# Patient Record
Sex: MALE | Race: WHITE | NOT HISPANIC OR LATINO | Employment: OTHER | URBAN - METROPOLITAN AREA
[De-identification: names, ages, dates, MRNs, and addresses within clinical notes are randomized per-mention and may not be internally consistent; named-entity substitution may affect disease eponyms.]

---

## 2018-11-13 ENCOUNTER — NURSE TRIAGE (OUTPATIENT)
Dept: PHYSICAL THERAPY | Facility: OTHER | Age: 68
End: 2018-11-13

## 2018-11-13 DIAGNOSIS — M54.50 CHRONIC MIDLINE LOW BACK PAIN WITHOUT SCIATICA: ICD-10-CM

## 2018-11-13 DIAGNOSIS — G89.29 CHRONIC MIDLINE LOW BACK PAIN WITHOUT SCIATICA: ICD-10-CM

## 2018-11-13 DIAGNOSIS — G89.29 CHRONIC RIGHT SHOULDER PAIN: ICD-10-CM

## 2018-11-13 DIAGNOSIS — M54.2 NECK PAIN: Primary | ICD-10-CM

## 2018-11-13 DIAGNOSIS — M25.511 CHRONIC RIGHT SHOULDER PAIN: ICD-10-CM

## 2018-11-13 NOTE — TELEPHONE ENCOUNTER
Pt is with wife at rehab center now and read brochure and decided to call  Neck hurts all the time  Right shoulder hurts and then my low back  Did physical therapy about 15 yrs ago  Went to pain management specialist about 3 yrs ago  Did MRI and obtained shots  Has pain when bending over to  items  Reports it's hard to sleep  Reason for Disposition   Is this a chronic condition? Background - Initial Assessment  Clinical complaint: Neck hurts all the time  Right shoulder hurts and then my low back  Date of onset: 15 yrs ago  Mechanism of injury: unk    Previous Treatment - Previous Treatment  Previous evaluation: pain management (3 yrs ago), chiropractor  Current provider: PCP  Diagnostics: MRI (3 yrs ago)  Previous treatment: physical therapy (15 yrs ago)    Protocols used:  AMB COMPREHENSIVE SPINE PROGRAM PROTOCOL    Pt reports he has a rash currently and is seeing a dermatologist    Started on his neck, spread across his chest, and is now on right arm, right leg and right ankle  Pt states that when he lays in bed flat and legs are straight, his lower back and legs hurt and to alleviate the  Pain he flexes his knees  States he is interested in physical therapy  RN also suggested [facility]) referral and pt agreed  Pt was currently at the [facility] with his wife and stated now that the referral is in, he will schedule right now with the

## 2018-11-19 ENCOUNTER — EVALUATION (OUTPATIENT)
Dept: PHYSICAL THERAPY | Facility: CLINIC | Age: 68
End: 2018-11-19
Payer: MEDICARE

## 2018-11-19 DIAGNOSIS — M25.511 CHRONIC RIGHT SHOULDER PAIN: ICD-10-CM

## 2018-11-19 DIAGNOSIS — M54.2 NECK PAIN: Primary | ICD-10-CM

## 2018-11-19 DIAGNOSIS — G89.29 CHRONIC MIDLINE LOW BACK PAIN WITHOUT SCIATICA: ICD-10-CM

## 2018-11-19 DIAGNOSIS — G89.29 CHRONIC RIGHT SHOULDER PAIN: ICD-10-CM

## 2018-11-19 DIAGNOSIS — M54.50 CHRONIC MIDLINE LOW BACK PAIN WITHOUT SCIATICA: ICD-10-CM

## 2018-11-19 PROCEDURE — 97112 NEUROMUSCULAR REEDUCATION: CPT | Performed by: PHYSICAL THERAPIST

## 2018-11-19 PROCEDURE — 97163 PT EVAL HIGH COMPLEX 45 MIN: CPT | Performed by: PHYSICAL THERAPIST

## 2018-11-19 PROCEDURE — G8991 OTHER PT/OT GOAL STATUS: HCPCS | Performed by: PHYSICAL THERAPIST

## 2018-11-19 PROCEDURE — G8990 OTHER PT/OT CURRENT STATUS: HCPCS | Performed by: PHYSICAL THERAPIST

## 2018-11-19 PROCEDURE — 97110 THERAPEUTIC EXERCISES: CPT | Performed by: PHYSICAL THERAPIST

## 2018-11-19 NOTE — PROGRESS NOTES
PT Evaluation     Today's date: 2018  Patient name: Chris Black  : 1950  MRN: [id number]  Referring provider: Kody Aguilar MD  Dx:   Encounter Diagnosis     ICD-10-CM    1  Neck pain M54 2 Ambulatory referral to PT spine   2  Chronic right shoulder pain M25 511 Ambulatory referral to PT spine    G89 29    3  Chronic midline low back pain without sciatica M54 5 Ambulatory referral to PT spine    G89 29                   Assessment  Functional limitations: Pain bending over, sitting longer than 25 minutes, carrying heavy objects, and looking over shoulder while driving  Assessment details: Abundio montoya 79 y o  male referred with primary diagnosis of Neck pain  (primary encounter diagnosis)  Chronic right shoulder pain  Chronic midline low back pain without sciatica   Patient presents with the following functional limitations: Pain bending over, sitting longer than 25 minutes, carrying heavy objects, and looking over shoulder while driving, as well as, decreased ROM and decreased core strength  Treatment to include: Manual therapy techniques, extremity/core strengthening, neuromuscular control exercises, balance/proprioception training as appropriate, instruction in a comprehensive HEP, and modalities as needed  They will benefit from skilled PT services to address the above functional deficits and to decrease pain to promote a return to their premorbid level of function  Understanding of Dx/Px/POC: good   Prognosis: fair    Goals  STG (4 weeks)  1  Patient will report pain as a 5-6/10 at worst with activity  2  Patient will demonstrate cervical and lumbar ROM to [facility] in order to improve ability to look over shoulder when driving  LTG (8 weeks)  1  Patient will report pain as a 4-5/10 at worst with everyday activity  2  Patient will demonstrate good body mechanics with lifting  3   Patient will be independent and compliant with a HEP in order to maintain gains made with skilled PT services  Plan  Patient would benefit from: skilled physical therapy  Planned therapy interventions: abdominal trunk stabilization, home exercise program, therapeutic exercise, therapeutic activities, stretching, strengthening, postural training, patient education, neuromuscular re-education and manual therapy  Frequency: 2x week  Duration in weeks: 8  Plan of Care beginning date: 2018  Plan of Care expiration date: 2019  Treatment plan discussed with: patient        Subjective Evaluation    History of Present Illness  Date of onset: 1998  Mechanism of injury: Patient states he hurt his lower back about 25 years ago  He tried physical therapy about 20 years ago  He went to pain management and had injections about 3 years ago  He had an MRI, but is unsure of results  He thinks he has bulging discs in both his neck and back  Pain has been constant for the past 20 years  Called [facility] and is referred now to outpatient PT services  Recurrent probem    Quality of life: fair    Pain  Current pain ratin  At best pain ratin  At worst pain ratin  Location: Right cervical paraspinals and UT  Lower thoracolumbar spine and right paraspinals  Quality: pressure  Progression: no change    Social Support    Employment status: working (Works 3-4x/wk for a few hours   at [employer]")  Hand dominance: right  Exercise history: Sedentary      Diagnostic Tests    FCE comments: Intermittent "numbness" into left medial thigh and calf  Denies weakness in bilateral UE/LE  Treatments  Previous treatment: chiropractic  Patient Goals  Patient goal: To relieve his pain  Objective     Special Questions  Positive for disturbed sleep   Negative for night pain, dizziness, faints, headaches, tinnitus, shortness of breath, bladder dysfunction, bowel dysfunction and saddle (S4) numbness    Postural Observations  Seated posture: poor  Standing posture: poor  Correction of posture: makes symptoms better        Palpation     Right   Hypertonic in the lumbar paraspinals  Tenderness of the lumbar paraspinals  Tenderness     Lumbar Spine  Tenderness in the spinous process (T11-L3)  Neurological Testing     Sensation   Cervical/Thoracic   Left   Intact: light touch    Right   Intact: light touch    Lumbar   Left   Intact: light touch    Right   Intact: light touch    Reflexes   Left   Biceps (C5/C6): normal (2+)  Brachioradialis (C6): normal (2+)  Triceps (C7): normal (2+)  Patellar (L4): normal (2+)  Achilles (S1): normal (2+)    Right   Biceps (C5/C6): normal (2+)  Brachioradialis (C6): normal (2+)  Triceps (C7): normal (2+)  Patellar (L4): normal (2+)  Achilles (S1): normal (2+)    Active Range of Motion   Cervical/Thoracic Spine   Cervical    Flexion: 35 degrees with pain  Extension: 45 degrees   Left lateral flexion: 35 degrees with pain  Right lateral flexion: 40 degrees   Left rotation: 65 degrees   Right rotation: 70 degrees     Additional Active Range of Motion Details  Lumbar ROM limited 50% all directions  Painful with flexion  Pain relieved with extension    Joint Play Hypomobile: C2, C3, C4, C5, C6 and C7     Strength/Myotome Testing   Cervical Spine     Left   Normal strength    Right etr  Normal strength    Left Hip   Planes of Motion   Flexion: 4+  Extension: 4+  Abduction: 4+    Right Hip   Planes of Motion   Flexion: 4+  Extension: 4+  Abduction: 4+    Left Knee   Flexion: 4+  Extension: 4+    Right Knee   Flexion: 4+  Extension: 4+    Left Ankle/Foot   Dorsiflexion: 4+    Right Ankle/Foot   Dorsiflexion: 4+    Tests   Cervical     Left   Positive cervical distraction and Spurling's sign  Lumbar   Positive repeated extension  Left   Positive passive SLR  Negative crossed SLR  Right   Positive passive SLR       Additional Tests Details  Right UT pain relieved with Loaded moderate retractions 3x10  Repeated flexion in standing decreased LBP  Repeated press-ups prone increased pain          Precautions None    Specialty Daily Treatment Diary     Manual  11/19       Loaded moderate cervical retractions 3x10       cervial traction                                    Exercise Diary  11/19       Postural education JF       Nustep with JAMIE Rice rows H,M,L BTB x20 ea       HS stretch kat  3x30"       Standing lumbar ext  2x10       Bkwd shoulder rolls        Scapular retractions        Bilateral UT stretch        Cervical retractions with overpressure Instructed       Supine PPT        [facility]        [facility]                                                                      Modalities

## 2018-11-27 ENCOUNTER — OFFICE VISIT (OUTPATIENT)
Dept: PHYSICAL THERAPY | Facility: CLINIC | Age: 68
End: 2018-11-27
Payer: MEDICARE

## 2018-11-27 DIAGNOSIS — M25.511 CHRONIC RIGHT SHOULDER PAIN: ICD-10-CM

## 2018-11-27 DIAGNOSIS — M54.2 NECK PAIN: Primary | ICD-10-CM

## 2018-11-27 DIAGNOSIS — G89.29 CHRONIC MIDLINE LOW BACK PAIN WITHOUT SCIATICA: ICD-10-CM

## 2018-11-27 DIAGNOSIS — G89.29 CHRONIC RIGHT SHOULDER PAIN: ICD-10-CM

## 2018-11-27 DIAGNOSIS — M54.50 CHRONIC MIDLINE LOW BACK PAIN WITHOUT SCIATICA: ICD-10-CM

## 2018-11-27 PROCEDURE — 97110 THERAPEUTIC EXERCISES: CPT | Performed by: PHYSICAL THERAPIST

## 2018-11-27 PROCEDURE — 97140 MANUAL THERAPY 1/> REGIONS: CPT | Performed by: PHYSICAL THERAPIST

## 2018-11-27 NOTE — PROGRESS NOTES
Daily Note     Today's date: 2018  Patient name: Ryne Gaines  : 1950  MRN: [id number]  Referring provider: Gladys Scott MD  Dx:   Encounter Diagnosis     ICD-10-CM    1  Neck pain M54 2    2  Chronic right shoulder pain M25 511     G89 29    3  Chronic midline low back pain without sciatica M54 5     G89 29                   Subjective: Patient reports resting pain today is a 7/10 in his cervical and lumbar spine  Objective: See treatment diary below      Assessment: Initiated exercises this session to address impairments noted during initial evaluation  Pt required minimal verbal and tactile cues to avoid shrugging shoulders during TB strengthening exercises  Pt reported significant reduction in cervical discomfort following manual cervical traction  Consider using mechanical traction in future visits  Pain decreased from 7/10- 4-5/10 in cervical spine  Plan: Progress treatment as tolerated  Precautions None     Specialty Daily Treatment Diary      Manual           Loaded moderate cervical retractions 3x10           cervical traction    JG                                                         Exercise Diary           Postural education JF           Nustep with UE    L5, 6 mins         Webslide rows H,M,L BTB x20 ea  BTB x20 ea         HS stretch kat  3x30"  3x30"         Standing lumbar ext    2x10  2 x 10         Bkwd shoulder rolls    20 x         Scapular retractions             Bilateral UT stretch    3 x 30" ea         Cervical retractions with overpressure Instructed           Supine PPT   10 x 10"         PPT with march    20 x 2" hold ea         Bridges             sidelying clamshells kat                                                                                                                      Modalities

## 2018-11-29 ENCOUNTER — OFFICE VISIT (OUTPATIENT)
Dept: PHYSICAL THERAPY | Facility: CLINIC | Age: 68
End: 2018-11-29
Payer: MEDICARE

## 2018-11-29 DIAGNOSIS — M25.511 CHRONIC RIGHT SHOULDER PAIN: ICD-10-CM

## 2018-11-29 DIAGNOSIS — G89.29 CHRONIC RIGHT SHOULDER PAIN: ICD-10-CM

## 2018-11-29 DIAGNOSIS — M54.50 CHRONIC MIDLINE LOW BACK PAIN WITHOUT SCIATICA: ICD-10-CM

## 2018-11-29 DIAGNOSIS — G89.29 CHRONIC MIDLINE LOW BACK PAIN WITHOUT SCIATICA: ICD-10-CM

## 2018-11-29 DIAGNOSIS — M54.2 NECK PAIN: Primary | ICD-10-CM

## 2018-11-29 PROCEDURE — 97140 MANUAL THERAPY 1/> REGIONS: CPT | Performed by: PHYSICAL THERAPIST

## 2018-11-29 PROCEDURE — 97110 THERAPEUTIC EXERCISES: CPT | Performed by: PHYSICAL THERAPIST

## 2018-11-29 NOTE — PROGRESS NOTES
Daily Note     Today's date: 2018  Patient name: Ryne Gaines  : 1950  MRN: [id number]  Referring provider: Gladys Scott MD  Dx:   Encounter Diagnosis     ICD-10-CM    1  Neck pain M54 2    2  Chronic right shoulder pain M25 511     G89 29    3  Chronic midline low back pain without sciatica M54 5     G89 29                   Subjective: Patient reports getting relief with traction last visit  Has been decorating a lot the last few days and he is sore all over  Right sided lumbar and cervical pain is a 6/10 today  Objective: See treatment diary below  Precautions None     Specialty Daily Treatment Diary      Manual           Loaded moderate cervical retractions 3x10           cervical traction    JG                                                         Exercise Diary         Postural education JF           Nustep with UE    L5, 6 mins  L5x10'       Webslide rows H,M,L BTB x20 ea  BTB x20 ea  BTB x20ea       HS stretch kat  3x30"  3x30"  3x30"       Standing lumbar ext  2x10  2 x 10  3x10       Bkwd shoulder rolls    20 x  20x       Scapular retractions      5"x20       Bilateral UT stretch    3 x 30" ea  3x30"       Cervical retractions with overpressure Instructed           Supine PPT   10 x 10"  5"x20       PPT with march    20 x 2" hold ea  2"x20 ea       Bridges             sidelying clamshells kat                                                                                                                      Modalities                                                             Assessment: Tolerated treatment well  Patient would benefit from continued PT  Pain decreased to a 4/10 after session today  Good symptom relief with manual cervical traction  Plan: Continue per plan of care

## 2018-12-04 ENCOUNTER — OFFICE VISIT (OUTPATIENT)
Dept: PHYSICAL THERAPY | Facility: CLINIC | Age: 68
End: 2018-12-04
Payer: MEDICARE

## 2018-12-04 DIAGNOSIS — M54.50 CHRONIC MIDLINE LOW BACK PAIN WITHOUT SCIATICA: ICD-10-CM

## 2018-12-04 DIAGNOSIS — G89.29 CHRONIC RIGHT SHOULDER PAIN: ICD-10-CM

## 2018-12-04 DIAGNOSIS — M25.511 CHRONIC RIGHT SHOULDER PAIN: ICD-10-CM

## 2018-12-04 DIAGNOSIS — M54.2 NECK PAIN: Primary | ICD-10-CM

## 2018-12-04 DIAGNOSIS — G89.29 CHRONIC MIDLINE LOW BACK PAIN WITHOUT SCIATICA: ICD-10-CM

## 2018-12-04 PROCEDURE — 97110 THERAPEUTIC EXERCISES: CPT | Performed by: PHYSICAL THERAPIST

## 2018-12-04 PROCEDURE — 97140 MANUAL THERAPY 1/> REGIONS: CPT | Performed by: PHYSICAL THERAPIST

## 2018-12-04 NOTE — PROGRESS NOTES
Daily Note     Today's date: 2018  Patient name: Philomena Campos  : 1950  MRN: [id number]  Referring provider: Joelle Benavides MD  Dx:   Encounter Diagnosis     ICD-10-CM    1  Neck pain M54 2    2  Chronic right shoulder pain M25 511     G89 29    3  Chronic midline low back pain without sciatica M54 5     G89 29                   Subjective: Pain 5 5/10 in neck and back today  Objective: See treatment diary below  Precautions None     Specialty Daily Treatment Diary      Manual  /     Loaded moderate cervical retractions 3x10           cervical traction    JG  JF  JF                                                     Exercise Diary  /     Postural education JF           Nustep with UE    L5, 6 mins  L5x10'  np     Webslide rows H,M,L BTB x20 ea  BTB x20 ea  BTB x20ea  BTB x20 ea     HS stretch kat  3x30"  3x30"  3x30"  3x30"     Standing lumbar ext  2x10  2 x 10  3x10  2x10     Bkwd shoulder rolls    20 x  20x  x30     Scapular retractions      5"x20  5"x30     Bilateral UT stretch    3 x 30" ea  3x30"  3x30"     Cervical retractions with overpressure Instructed           Supine PPT   10 x 10"  5"x20  5"x20     PPT with march    20 x 2" hold ea  2"x20 ea  x20     Bridges             sidelying clamshells kat               UBE seated       L1x10'                                                                                               Modalities                                                             Assessment: Tolerated treatment well  Patient would benefit from continued PT  Pain decreased to a 4/10 after session today  Plan: Continue per plan of care

## 2018-12-06 ENCOUNTER — OFFICE VISIT (OUTPATIENT)
Dept: PHYSICAL THERAPY | Facility: CLINIC | Age: 68
End: 2018-12-06
Payer: MEDICARE

## 2018-12-06 DIAGNOSIS — M54.2 NECK PAIN: Primary | ICD-10-CM

## 2018-12-06 DIAGNOSIS — M25.511 CHRONIC RIGHT SHOULDER PAIN: ICD-10-CM

## 2018-12-06 DIAGNOSIS — G89.29 CHRONIC RIGHT SHOULDER PAIN: ICD-10-CM

## 2018-12-06 DIAGNOSIS — M54.50 CHRONIC MIDLINE LOW BACK PAIN WITHOUT SCIATICA: ICD-10-CM

## 2018-12-06 DIAGNOSIS — G89.29 CHRONIC MIDLINE LOW BACK PAIN WITHOUT SCIATICA: ICD-10-CM

## 2018-12-06 PROCEDURE — 97110 THERAPEUTIC EXERCISES: CPT | Performed by: PHYSICAL THERAPIST

## 2018-12-06 PROCEDURE — 97112 NEUROMUSCULAR REEDUCATION: CPT | Performed by: PHYSICAL THERAPIST

## 2018-12-06 PROCEDURE — 97140 MANUAL THERAPY 1/> REGIONS: CPT | Performed by: PHYSICAL THERAPIST

## 2018-12-06 NOTE — PROGRESS NOTES
Daily Note     Today's date: 2018  Patient name: Nicole Salinas  : 1950  MRN: [id number]  Referring provider: Arturo Hanson MD  Dx:   Encounter Diagnosis     ICD-10-CM    1  Neck pain M54 2    2  Chronic right shoulder pain M25 511     G89 29    3  Chronic midline low back pain without sciatica M54 5     G89 29                   Subjective: Patient reports pain today is a 5 5/10 in his lower back and bilateral UT  Has been non-adherent with HEP due to spouse not feeling well and having to take her to MD appointments  Objective: See treatment diary below  Precautions None     Specialty Daily Treatment Diary      Manual    JF   Loaded moderate cervical retractions 3x10           cervical traction    JG  JF  JF  JF                                                   Exercise Diary     Postural education JF           Nustep with UE    L5, 6 mins  L5x10'  np     Webslide rows H,M,L BTB x20 ea  BTB x20 ea  BTB x20ea  BTB x20 ea  BTB x30   HS stretch kat  3x30"  3x30"  3x30"  3x30"     Standing lumbar ext  2x10  2 x 10  3x10  2x10     Bkwd shoulder rolls    20 x  20x  x30  x30   Scapular retractions      5"x20  5"x30  5"x30   Bilateral UT stretch    3 x 30" ea  3x30"  3x30"  5"x30   Cervical retractions with overpressure Instructed           Supine PPT   10 x 10"  5"x20  5"x20  5"x30   PPT with march    20 x 2" hold ea  2"x20 ea  x20  x20   Bridges          x20   sidelying clamshells kat               UBE seated       L1x10'  L1x10'                                                                                             Modalities                                                              Assessment: Tolerated treatment well  Patient would benefit from continued PT  Decreased tightness and pain after session  Plan: Continue per plan of care

## 2018-12-11 ENCOUNTER — APPOINTMENT (OUTPATIENT)
Dept: PHYSICAL THERAPY | Facility: CLINIC | Age: 68
End: 2018-12-11
Payer: MEDICARE

## 2018-12-11 NOTE — PROGRESS NOTES
Daily Note     Today's date: 2018  Patient name: Davion Masterson  : 1950  MRN: [id number]  Referring provider: Doyle Davidson MD  Dx:   Encounter Diagnosis     ICD-10-CM    1  Neck pain M54 2    2  Chronic right shoulder pain M25 511     G89 29    3  Chronic midline low back pain without sciatica M54 5     G89 29                   Subjective: ***      Objective: See treatment diary below  Precautions None     Specialty Daily Treatment Diary      Manual    JF   Loaded moderate cervical retractions 3x10           cervical traction    JG  JF  JF  JF                                                   Exercise Diary    12/6   Postural education JF           Nustep with UE    L5, 6 mins  L5x10'  np     Webslide rows H,M,L BTB x20 ea  BTB x20 ea  BTB x20ea  BTB x20 ea  BTB x30   HS stretch kat  3x30"  3x30"  3x30"  3x30"     Standing lumbar ext  2x10  2 x 10  3x10  2x10     Bkwd shoulder rolls    20 x  20x  x30  x30   Scapular retractions      5"x20  5"x30  5"x30   Bilateral UT stretch    3 x 30" ea  3x30"  3x30"  5"x30   Cervical retractions with overpressure Instructed           Supine PPT   10 x 10"  5"x20  5"x20  5"x30   PPT with march    20 x 2" hold ea  2"x20 ea  x20  x20   Bridges          x20   sidelying clamshells kat               UBE seated       L1x10'  L1x10'                                                                                             Modalities                                                              Assessment: Tolerated treatment {Tolerated treatment :[id number]}   Patient {assessment:[id number]}      Plan: {PLAN:[id number]}

## 2018-12-13 ENCOUNTER — APPOINTMENT (OUTPATIENT)
Dept: PHYSICAL THERAPY | Facility: CLINIC | Age: 68
End: 2018-12-13
Payer: MEDICARE

## 2018-12-18 ENCOUNTER — EVALUATION (OUTPATIENT)
Dept: PHYSICAL THERAPY | Facility: CLINIC | Age: 68
End: 2018-12-18
Payer: MEDICARE

## 2018-12-18 DIAGNOSIS — M54.50 CHRONIC MIDLINE LOW BACK PAIN WITHOUT SCIATICA: ICD-10-CM

## 2018-12-18 DIAGNOSIS — G89.29 CHRONIC MIDLINE LOW BACK PAIN WITHOUT SCIATICA: ICD-10-CM

## 2018-12-18 DIAGNOSIS — M54.2 NECK PAIN: Primary | ICD-10-CM

## 2018-12-18 DIAGNOSIS — M25.511 CHRONIC RIGHT SHOULDER PAIN: ICD-10-CM

## 2018-12-18 DIAGNOSIS — G89.29 CHRONIC RIGHT SHOULDER PAIN: ICD-10-CM

## 2018-12-18 PROCEDURE — 97112 NEUROMUSCULAR REEDUCATION: CPT | Performed by: PHYSICAL THERAPIST

## 2018-12-18 PROCEDURE — G8990 OTHER PT/OT CURRENT STATUS: HCPCS | Performed by: PHYSICAL THERAPIST

## 2018-12-18 PROCEDURE — 97140 MANUAL THERAPY 1/> REGIONS: CPT | Performed by: PHYSICAL THERAPIST

## 2018-12-18 PROCEDURE — G8991 OTHER PT/OT GOAL STATUS: HCPCS | Performed by: PHYSICAL THERAPIST

## 2018-12-18 PROCEDURE — 97110 THERAPEUTIC EXERCISES: CPT | Performed by: PHYSICAL THERAPIST

## 2018-12-18 NOTE — PROGRESS NOTES
PT Re-Evaluation     Today's date: 2018  Patient name: Thompson Billy  : 1950  MRN: [id number]  Referring provider: Dilma Khanna MD  Dx:   Encounter Diagnosis     ICD-10-CM    1  Neck pain M54 2    2  Chronic right shoulder pain M25 511     G89 29    3  Chronic midline low back pain without sciatica M54 5     G89 29                   Assessment  Assessment details: Patient feels neck pain is significantly decreased and he notices improved cervical ROM  He performs HEP and requests DC of PT services at this time for his neck  While he notices improvement in his LBP, symptoms persist   He has significant tightness in his bilateral quads and hamstrings  He will benefit from continued PT services in order to improve his posture and decrease his pain to improve his ability to function during the day  Functional limitations: Pain bending over, sitting longer than 25 minutes, carrying heavy objects, and looking over shoulder while drivingUnderstanding of Dx/Px/POC: good   Prognosis: fair    Goals  STG (4 weeks)  1  Patient will report pain as a 5-6/10 at worst with activity  2  Patient will demonstrate cervical and lumbar ROM to [facility] in order to improve ability to look over shoulder when driving  LTG (8 weeks)  1  Patient will report pain as a 4-5/10 at worst with everyday activity  2  Patient will demonstrate good body mechanics with lifting  3  Patient will be independent and compliant with a HEP in order to maintain gains made with skilled PT services      Plan  Patient would benefit from: skilled physical therapy  Planned therapy interventions: abdominal trunk stabilization, home exercise program, therapeutic exercise, therapeutic activities, stretching, strengthening, postural training, patient education, neuromuscular re-education and manual therapy  Frequency: 2x week  Duration in weeks: 4  Plan of Care beginning date: 2018  Plan of Care expiration date: 1/15/2019  Treatment plan discussed with: patient        Subjective Evaluation    History of Present Illness  Date of onset: 1998  Mechanism of injury: Patient feels 60% improved to date  Patient states his neck feels much better  He states the intensity of pain is less and he notices greatly improved cervical rotation ROM  Shoulder pain is also decreased  Back is somewhat improved  Still has pain in lower back with bending over to pick things up off of the floor  Wishes to focus therapy on his lower back at this time  Recurrent probem    Quality of life: fair    Pain  No pain reported  Current pain ratin  At best pain rating: 3  At worst pain ratin  Location: Right cervical paraspinals and UT  Lower thoracolumbar spine and right paraspinals  Quality: pressure  Progression: improved    Social Support    Employment status: working (Works 3-4x/wk for a few hours   at [email or web address]")  Hand dominance: right  Exercise history: Sedentary      Diagnostic Tests    FCE comments: Intermittent "numbness" into left medial thigh and calf  Denies weakness in bilateral UE/LE  Treatments  Previous treatment: chiropractic  Patient Goals  Patient goal: To relieve his pain  Objective     Special Questions  Negative for night pain, disturbed sleep, dizziness, faints, headaches, tinnitus, shortness of breath, bladder dysfunction, bowel dysfunction and saddle (S4) numbness    Postural Observations  Seated posture: poor  Standing posture: poor  Correction of posture: makes symptoms better        Palpation     Right   Hypertonic in the lumbar paraspinals  Tenderness of the lumbar paraspinals  Tenderness     Lumbar Spine  Tenderness in the spinous process (T11-L3)       Neurological Testing     Sensation   Cervical/Thoracic   Left   Intact: light touch    Right   Intact: light touch    Lumbar   Left   Intact: light touch    Right   Intact: light touch    Reflexes   Left   Biceps (C5/C6): normal (2+)  Brachioradialis (C6): normal (2+)  Triceps (C7): normal (2+)  Patellar (L4): normal (2+)  Achilles (S1): normal (2+)    Right   Biceps (C5/C6): normal (2+)  Brachioradialis (C6): normal (2+)  Triceps (C7): normal (2+)  Patellar (L4): normal (2+)  Achilles (S1): normal (2+)    Active Range of Motion   Cervical/Thoracic Spine   Cervical    Flexion: 47 degrees with pain  Extension: 45 degrees   Left lateral flexion: 45 degrees with pain  Right lateral flexion: 45 degrees   Left rotation: 73 degrees   Right rotation: 87 degrees     Additional Active Range of Motion Details  Lumbar ROM limited 50% all directions  Painful with flexion  Pain relieved with extension    Strength/Myotome Testing   Cervical Spine     Left   Normal strength    Right etr  Normal strength    Left Hip   Planes of Motion   Flexion: 4+  Extension: 4+  Abduction: 4+    Right Hip   Planes of Motion   Flexion: 4+  Extension: 4+  Abduction: 4+    Left Knee   Flexion: 4+  Extension: 4+    Right Knee   Flexion: 4+  Extension: 4+    Left Ankle/Foot   Dorsiflexion: 4+    Right Ankle/Foot   Dorsiflexion: 4+    Tests   Cervical     Left   Positive cervical distraction  Negative Spurling's sign  Lumbar   Positive repeated extension  Left   Positive passive SLR  Negative crossed SLR  Right   Positive passive SLR  Additional Tests Details  Right UT pain relieved with Loaded moderate retractions 3x10  Repeated flexion in standing decreased LBP  Repeated press-ups prone increased pain          Precautions None     Specialty Daily Treatment Diary      Manual  12/18 11-27 11/29  12/4  JF   Loaded moderate cervical retractions 3x10           cervical traction    JG  JF  JF  JF    PA mobs L1-5                                               Exercise Diary  12/18 11-27 11/29 12/4  12/6   Postural education JF           Nustep with UE    L5, 6 mins  L5x10'  np     Webslide rows H,M,L BTB x20 ea  BTB x20 ea  BTB x20ea  BTB x20 ea  BTB x30   HS stretch kat   3x30"  3x30"  3x30"  3x30"     Standing lumbar ext    2x10  2 x 10  3x10  2x10     Bkwd shoulder rolls  HEP  20 x  20x  x30  x30   Scapular retractions  HEP    5"x20  5"x30  5"x30   Bilateral UT stretch  HEP  3 x 30" ea  3x30"  3x30"  5"x30   Cervical retractions with overpressure IHEP           Supine PPT  5"x30 10 x 10"  5"x20  5"x20  5"x30   PPT with march    20 x 2" hold ea  2"x20 ea  x20  x20   Bridges  3"x20        x20   sidelying clamshells kat               UBE seated  L1x10'     L1x10'  L1x10'    T-ball crush in hooklying              LTR supine  10"x10                                                                         Modalities

## 2018-12-20 ENCOUNTER — APPOINTMENT (OUTPATIENT)
Dept: PHYSICAL THERAPY | Facility: CLINIC | Age: 68
End: 2018-12-20
Payer: MEDICARE

## 2018-12-20 NOTE — PROGRESS NOTES
Daily Note     Today's date: 2018  Patient name: Jennifer Bills  : 1950  MRN: [id number]  Referring provider: Hoda Brown MD  Dx: No diagnosis found  Subjective: ***      Objective: See treatment diary below  Precautions None     Specialty Daily Treatment Diary      Manual    JF   Loaded moderate cervical retractions 3x10           cervical traction    JG  JF  JF  JF    PA mobs L1-5                                               Exercise Diary    12   Postural education JF           Nustep with UE    L5, 6 mins  L5x10'  np     Webslide rows H,M,L BTB x20 ea  BTB x20 ea  BTB x20ea  BTB x20 ea  BTB x30   HS stretch kat  3x30"  3x30"  3x30"  3x30"     Standing lumbar ext  2x10  2 x 10  3x10  2x10     Bkwd shoulder rolls  HEP  20 x  20x  x30  x30   Scapular retractions  HEP    5"x20  5"x30  5"x30   Bilateral UT stretch  HEP  3 x 30" ea  3x30"  3x30"  5"x30   Cervical retractions with overpressure IHEP           Supine PPT  5"x30 10 x 10"  5"x20  5"x20  5"x30   PPT with march    20 x 2" hold ea  2"x20 ea  x20  x20   Bridges  3"x20        x20   sidelying clamshells kat               UBE seated  L1x10'     L1x10'  L1x10'    T-ball crush in hooklying              LTR supine  10"x10                                                                         Modalities                                                             Assessment: Tolerated treatment {Tolerated treatment :[id number]}   Patient {assessment:[id number]}      Plan: {PLAN:[id number]}

## 2019-01-18 NOTE — PROGRESS NOTES
Spouse is having surgery and patient is unable to attend therapy at this time  Will get evaluation for PT when able to attend in the future

## 2019-03-07 ENCOUNTER — TRANSCRIBE ORDERS (OUTPATIENT)
Dept: ADMINISTRATIVE | Facility: HOSPITAL | Age: 69
End: 2019-03-07

## 2019-03-07 ENCOUNTER — APPOINTMENT (OUTPATIENT)
Dept: LAB | Facility: CLINIC | Age: 69
End: 2019-03-07
Payer: MEDICARE

## 2019-03-07 DIAGNOSIS — R51.9 FACIAL PAIN: ICD-10-CM

## 2019-03-07 DIAGNOSIS — N41.9 PROSTATITIS, UNSPECIFIED PROSTATITIS TYPE: ICD-10-CM

## 2019-03-07 DIAGNOSIS — Z12.5 SPECIAL SCREENING FOR MALIGNANT NEOPLASM OF PROSTATE: ICD-10-CM

## 2019-03-07 DIAGNOSIS — R53.1 ASTHENIA: ICD-10-CM

## 2019-03-07 DIAGNOSIS — E78.2 MIXED HYPERLIPIDEMIA: Primary | ICD-10-CM

## 2019-03-07 DIAGNOSIS — E78.2 MIXED HYPERLIPIDEMIA: ICD-10-CM

## 2019-03-07 LAB
ALBUMIN SERPL BCP-MCNC: 3.8 G/DL (ref 3.5–5)
ALP SERPL-CCNC: 100 U/L (ref 46–116)
ALT SERPL W P-5'-P-CCNC: 42 U/L (ref 12–78)
ANION GAP SERPL CALCULATED.3IONS-SCNC: 4 MMOL/L (ref 4–13)
AST SERPL W P-5'-P-CCNC: 24 U/L (ref 5–45)
BASOPHILS # BLD AUTO: 0.07 THOUSANDS/ΜL (ref 0–0.1)
BASOPHILS NFR BLD AUTO: 1 % (ref 0–1)
BILIRUB SERPL-MCNC: 0.71 MG/DL (ref 0.2–1)
BUN SERPL-MCNC: 18 MG/DL (ref 5–25)
CALCIUM SERPL-MCNC: 9.5 MG/DL (ref 8.3–10.1)
CHLORIDE SERPL-SCNC: 104 MMOL/L (ref 100–108)
CHOLEST SERPL-MCNC: 226 MG/DL (ref 50–200)
CO2 SERPL-SCNC: 28 MMOL/L (ref 21–32)
CREAT SERPL-MCNC: 1.48 MG/DL (ref 0.6–1.3)
EOSINOPHIL # BLD AUTO: 0.53 THOUSAND/ΜL (ref 0–0.61)
EOSINOPHIL NFR BLD AUTO: 8 % (ref 0–6)
ERYTHROCYTE [DISTWIDTH] IN BLOOD BY AUTOMATED COUNT: 12.2 % (ref 11.6–15.1)
GFR SERPL CREATININE-BSD FRML MDRD: 48 ML/MIN/1.73SQ M
GLUCOSE P FAST SERPL-MCNC: 90 MG/DL (ref 65–99)
HCT VFR BLD AUTO: 53 % (ref 36.5–49.3)
HDLC SERPL-MCNC: 43 MG/DL (ref 40–60)
HGB BLD-MCNC: 16.9 G/DL (ref 12–17)
IMM GRANULOCYTES # BLD AUTO: 0.02 THOUSAND/UL (ref 0–0.2)
IMM GRANULOCYTES NFR BLD AUTO: 0 % (ref 0–2)
LDLC SERPL CALC-MCNC: 162 MG/DL (ref 0–100)
LYMPHOCYTES # BLD AUTO: 1.63 THOUSANDS/ΜL (ref 0.6–4.47)
LYMPHOCYTES NFR BLD AUTO: 25 % (ref 14–44)
MCH RBC QN AUTO: 31 PG (ref 26.8–34.3)
MCHC RBC AUTO-ENTMCNC: 31.9 G/DL (ref 31.4–37.4)
MCV RBC AUTO: 97 FL (ref 82–98)
MONOCYTES # BLD AUTO: 0.74 THOUSAND/ΜL (ref 0.17–1.22)
MONOCYTES NFR BLD AUTO: 11 % (ref 4–12)
NEUTROPHILS # BLD AUTO: 3.57 THOUSANDS/ΜL (ref 1.85–7.62)
NEUTS SEG NFR BLD AUTO: 55 % (ref 43–75)
NONHDLC SERPL-MCNC: 183 MG/DL
NRBC BLD AUTO-RTO: 0 /100 WBCS
PLATELET # BLD AUTO: 221 THOUSANDS/UL (ref 149–390)
PMV BLD AUTO: 11.3 FL (ref 8.9–12.7)
POTASSIUM SERPL-SCNC: 4.3 MMOL/L (ref 3.5–5.3)
PROT SERPL-MCNC: 7.3 G/DL (ref 6.4–8.2)
PSA SERPL-MCNC: 2.1 NG/ML (ref 0–4)
RBC # BLD AUTO: 5.46 MILLION/UL (ref 3.88–5.62)
SODIUM SERPL-SCNC: 136 MMOL/L (ref 136–145)
T3 SERPL-MCNC: 1 NG/ML (ref 0.6–1.8)
T4 SERPL-MCNC: 5.4 UG/DL (ref 4.7–13.3)
TRIGL SERPL-MCNC: 104 MG/DL
TSH SERPL DL<=0.05 MIU/L-ACNC: 2.39 UIU/ML (ref 0.36–3.74)
WBC # BLD AUTO: 6.56 THOUSAND/UL (ref 4.31–10.16)

## 2019-03-07 PROCEDURE — 80053 COMPREHEN METABOLIC PANEL: CPT

## 2019-03-07 PROCEDURE — 84153 ASSAY OF PSA TOTAL: CPT

## 2019-03-07 PROCEDURE — 84443 ASSAY THYROID STIM HORMONE: CPT

## 2019-03-07 PROCEDURE — 84480 ASSAY TRIIODOTHYRONINE (T3): CPT

## 2019-03-07 PROCEDURE — 80061 LIPID PANEL: CPT

## 2019-03-07 PROCEDURE — 85025 COMPLETE CBC W/AUTO DIFF WBC: CPT

## 2019-03-07 PROCEDURE — 36415 COLL VENOUS BLD VENIPUNCTURE: CPT

## 2019-03-07 PROCEDURE — 84436 ASSAY OF TOTAL THYROXINE: CPT

## 2019-03-29 ENCOUNTER — TRANSCRIBE ORDERS (OUTPATIENT)
Dept: ADMINISTRATIVE | Facility: HOSPITAL | Age: 69
End: 2019-03-29

## 2019-03-29 DIAGNOSIS — R79.9 ABNORMAL BLOOD FINDING: ICD-10-CM

## 2019-03-29 DIAGNOSIS — R93.5 ABNORMAL ABDOMINAL ULTRASOUND: Primary | ICD-10-CM

## 2019-04-02 ENCOUNTER — APPOINTMENT (OUTPATIENT)
Dept: ULTRASOUND IMAGING | Facility: CLINIC | Age: 69
End: 2019-04-02
Payer: MEDICARE

## 2019-04-02 ENCOUNTER — HOSPITAL ENCOUNTER (OUTPATIENT)
Dept: ULTRASOUND IMAGING | Facility: CLINIC | Age: 69
Discharge: HOME | End: 2019-04-02
Payer: MEDICARE

## 2019-04-02 DIAGNOSIS — R93.5 ABNORMAL ABDOMINAL ULTRASOUND: ICD-10-CM

## 2019-04-02 PROCEDURE — 76700 US EXAM ABDOM COMPLETE: CPT

## 2020-11-12 ENCOUNTER — TRANSCRIBE ORDERS (OUTPATIENT)
Dept: VASCULAR SURGERY | Facility: CLINIC | Age: 70
End: 2020-11-12

## 2020-11-12 ENCOUNTER — TELEPHONE (OUTPATIENT)
Dept: VASCULAR SURGERY | Facility: CLINIC | Age: 70
End: 2020-11-12

## 2020-11-12 DIAGNOSIS — I65.23 CAROTID STENOSIS, BILATERAL: Primary | ICD-10-CM

## 2020-11-13 ENCOUNTER — CONSULT (OUTPATIENT)
Dept: VASCULAR SURGERY | Facility: CLINIC | Age: 70
End: 2020-11-13
Payer: MEDICARE

## 2020-11-13 VITALS
SYSTOLIC BLOOD PRESSURE: 124 MMHG | BODY MASS INDEX: 28.63 KG/M2 | HEART RATE: 80 BPM | DIASTOLIC BLOOD PRESSURE: 80 MMHG | HEIGHT: 70 IN | TEMPERATURE: 98.1 F | WEIGHT: 200 LBS

## 2020-11-13 DIAGNOSIS — I63.212 CEREBRAL INFARCTION DUE TO OCCLUSION OF LEFT VERTEBRAL ARTERY (HCC): ICD-10-CM

## 2020-11-13 DIAGNOSIS — I65.02 VERTEBRAL ARTERY OCCLUSION, LEFT: ICD-10-CM

## 2020-11-13 DIAGNOSIS — I65.23 CAROTID STENOSIS, BILATERAL: ICD-10-CM

## 2020-11-13 DIAGNOSIS — I70.219 ATHEROSCLEROSIS OF LOWER EXTREMITY WITH CLAUDICATION (HCC): ICD-10-CM

## 2020-11-13 DIAGNOSIS — Q28.2 CEREBRAL ARTERIOVENOUS MALFORMATION (AVM): Primary | ICD-10-CM

## 2020-11-13 DIAGNOSIS — R42 DIZZINESS: ICD-10-CM

## 2020-11-13 DIAGNOSIS — I65.21 RIGHT CAROTID ARTERY OCCLUSION: ICD-10-CM

## 2020-11-13 PROCEDURE — 99204 OFFICE O/P NEW MOD 45 MIN: CPT | Performed by: SURGERY

## 2020-12-09 ENCOUNTER — HOSPITAL ENCOUNTER (OUTPATIENT)
Dept: VASCULAR ULTRASOUND | Facility: HOSPITAL | Age: 70
Discharge: HOME | End: 2020-12-09
Attending: SURGERY
Payer: MEDICARE

## 2020-12-09 DIAGNOSIS — I65.23 CAROTID STENOSIS, BILATERAL: ICD-10-CM

## 2020-12-09 DIAGNOSIS — I70.219 ATHEROSCLEROSIS OF LOWER EXTREMITY WITH CLAUDICATION (HCC): ICD-10-CM

## 2020-12-09 PROCEDURE — 93923 UPR/LXTR ART STDY 3+ LVLS: CPT

## 2020-12-09 PROCEDURE — 93925 LOWER EXTREMITY STUDY: CPT

## 2020-12-09 PROCEDURE — 93978 VASCULAR STUDY: CPT

## 2020-12-09 PROCEDURE — 93880 EXTRACRANIAL BILAT STUDY: CPT

## 2020-12-10 PROCEDURE — 93978 VASCULAR STUDY: CPT | Performed by: SURGERY

## 2020-12-10 PROCEDURE — 93880 EXTRACRANIAL BILAT STUDY: CPT | Performed by: SURGERY

## 2020-12-10 PROCEDURE — 93925 LOWER EXTREMITY STUDY: CPT | Performed by: SURGERY

## 2020-12-10 PROCEDURE — 93922 UPR/L XTREMITY ART 2 LEVELS: CPT | Performed by: SURGERY

## 2020-12-21 ENCOUNTER — TELEPHONE (OUTPATIENT)
Dept: VASCULAR SURGERY | Facility: CLINIC | Age: 70
End: 2020-12-21

## 2020-12-22 ENCOUNTER — APPOINTMENT (OUTPATIENT)
Dept: LAB | Facility: CLINIC | Age: 70
End: 2020-12-22
Payer: MEDICARE

## 2020-12-22 ENCOUNTER — OFFICE VISIT (OUTPATIENT)
Dept: VASCULAR SURGERY | Facility: CLINIC | Age: 70
End: 2020-12-22
Payer: MEDICARE

## 2020-12-22 VITALS
DIASTOLIC BLOOD PRESSURE: 80 MMHG | RESPIRATION RATE: 20 BRPM | SYSTOLIC BLOOD PRESSURE: 138 MMHG | BODY MASS INDEX: 28.77 KG/M2 | TEMPERATURE: 99 F | WEIGHT: 201 LBS | HEIGHT: 70 IN | HEART RATE: 62 BPM

## 2020-12-22 DIAGNOSIS — I65.23 CAROTID STENOSIS, BILATERAL: ICD-10-CM

## 2020-12-22 DIAGNOSIS — N18.31 STAGE 3A CHRONIC KIDNEY DISEASE (HCC): ICD-10-CM

## 2020-12-22 DIAGNOSIS — R00.2 PALPITATIONS: ICD-10-CM

## 2020-12-22 DIAGNOSIS — Q28.2 CEREBRAL ARTERIOVENOUS MALFORMATION (AVM): ICD-10-CM

## 2020-12-22 DIAGNOSIS — I65.21 RIGHT CAROTID ARTERY OCCLUSION: ICD-10-CM

## 2020-12-22 DIAGNOSIS — R42 DIZZINESS: ICD-10-CM

## 2020-12-22 DIAGNOSIS — I65.02 VERTEBRAL ARTERY OCCLUSION, LEFT: ICD-10-CM

## 2020-12-22 DIAGNOSIS — I70.219 ATHEROSCLEROSIS OF LOWER EXTREMITY WITH CLAUDICATION (HCC): ICD-10-CM

## 2020-12-22 DIAGNOSIS — I65.23 CAROTID STENOSIS, BILATERAL: Primary | ICD-10-CM

## 2020-12-22 LAB
ANION GAP SERPL CALCULATED.3IONS-SCNC: 3 MMOL/L (ref 4–13)
BUN SERPL-MCNC: 28 MG/DL (ref 5–25)
CALCIUM SERPL-MCNC: 9.8 MG/DL (ref 8.3–10.1)
CHLORIDE SERPL-SCNC: 107 MMOL/L (ref 100–108)
CO2 SERPL-SCNC: 30 MMOL/L (ref 21–32)
CREAT SERPL-MCNC: 1.5 MG/DL (ref 0.6–1.3)
GFR SERPL CREATININE-BSD FRML MDRD: 46 ML/MIN/1.73SQ M
GLUCOSE SERPL-MCNC: 102 MG/DL (ref 65–140)
POTASSIUM SERPL-SCNC: 4.2 MMOL/L (ref 3.5–5.3)
SODIUM SERPL-SCNC: 140 MMOL/L (ref 136–145)

## 2020-12-22 PROCEDURE — 80048 BASIC METABOLIC PNL TOTAL CA: CPT

## 2020-12-22 PROCEDURE — 36415 COLL VENOUS BLD VENIPUNCTURE: CPT

## 2020-12-22 PROCEDURE — 99215 OFFICE O/P EST HI 40 MIN: CPT | Performed by: SURGERY

## 2020-12-22 RX ORDER — ATORVASTATIN CALCIUM 40 MG/1
40 TABLET, FILM COATED ORAL
COMMUNITY
Start: 2020-11-23 | End: 2021-03-01 | Stop reason: SDUPTHER

## 2020-12-22 RX ORDER — ASPIRIN 81 MG/1
81 TABLET ORAL DAILY
COMMUNITY

## 2020-12-28 ENCOUNTER — NURSE TRIAGE (OUTPATIENT)
Dept: OTHER | Facility: OTHER | Age: 70
End: 2020-12-28

## 2020-12-28 ENCOUNTER — TELEPHONE (OUTPATIENT)
Dept: VASCULAR SURGERY | Facility: CLINIC | Age: 70
End: 2020-12-28

## 2020-12-28 DIAGNOSIS — Z11.59 SPECIAL SCREENING EXAMINATION FOR VIRAL DISEASE: Primary | ICD-10-CM

## 2020-12-28 RX ORDER — SODIUM CHLORIDE 9 MG/ML
274 INJECTION, SOLUTION INTRAVENOUS CONTINUOUS
Status: CANCELLED | OUTPATIENT
Start: 2021-01-18 | End: 2021-01-18

## 2020-12-28 RX ORDER — SODIUM CHLORIDE 9 MG/ML
114 INJECTION, SOLUTION INTRAVENOUS CONTINUOUS
Status: CANCELLED | OUTPATIENT
Start: 2021-01-18 | End: 2021-01-18

## 2021-01-06 ENCOUNTER — OFFICE VISIT (OUTPATIENT)
Dept: CARDIOLOGY CLINIC | Facility: CLINIC | Age: 71
End: 2021-01-06
Payer: MEDICARE

## 2021-01-06 ENCOUNTER — PROCEDURE VISIT (OUTPATIENT)
Dept: CARDIOLOGY CLINIC | Facility: CLINIC | Age: 71
End: 2021-01-06
Payer: MEDICARE

## 2021-01-06 VITALS
BODY MASS INDEX: 28.35 KG/M2 | HEIGHT: 70 IN | WEIGHT: 198 LBS | SYSTOLIC BLOOD PRESSURE: 136 MMHG | HEART RATE: 74 BPM | DIASTOLIC BLOOD PRESSURE: 82 MMHG

## 2021-01-06 DIAGNOSIS — R42 DIZZINESS: ICD-10-CM

## 2021-01-06 DIAGNOSIS — R00.2 PALPITATIONS: Primary | ICD-10-CM

## 2021-01-06 DIAGNOSIS — E78.2 MIXED HYPERLIPIDEMIA: ICD-10-CM

## 2021-01-06 DIAGNOSIS — R94.31 ABNORMAL ELECTROCARDIOGRAM (ECG) (EKG): ICD-10-CM

## 2021-01-06 DIAGNOSIS — N18.9 CHRONIC KIDNEY DISEASE, UNSPECIFIED CKD STAGE: ICD-10-CM

## 2021-01-06 DIAGNOSIS — R00.2 PALPITATIONS: ICD-10-CM

## 2021-01-06 DIAGNOSIS — I44.7 LEFT BUNDLE BRANCH BLOCK (LBBB): Primary | ICD-10-CM

## 2021-01-06 PROCEDURE — 99204 OFFICE O/P NEW MOD 45 MIN: CPT | Performed by: INTERNAL MEDICINE

## 2021-01-06 PROCEDURE — 93246 EXT ECG>7D<15D RECORDING: CPT | Performed by: INTERNAL MEDICINE

## 2021-01-06 NOTE — PROGRESS NOTES
[facility] [address] [facility] [id number] [phone]                                              Cardiology Office Consult  Kylie Coles, 79 y o  male  YOB: 1950  MRN: [id number] Encounter: [id number]      PCP - Piter Lee DO    Assessment  1  Dizziness  2  Palpitations   3  Left bundle branch block  4  Bilateral carotid artery stenosis   5  Left vertebral artery stenosis   6  Lower extremity claudication  7  Former smoker, recently quit    Plan  Dizziness, palpitations  · Dizziness appears to be multi-factorial, but predominant component appears to be - disequilibrium, possibly related to vertebral artery occlusion  · Neurology evaluation is pending as well  · No major orthostatics symptoms  · He does have palpitations, but  He does not seem to have palpitations and dizziness to gather   · Will get an ambulatory extended Holter monitor to clarify this further    Left bundle branch block  · Incidentally noted on ECG recently  · He has no active complains of chest pain or major shortness of breath  · He does have several vascular risk factors and has bilateral carotid stenosis, vertebral artery stenosis and lower extremity claudication, and is at significant risk of CAD  · Will check a nuclear Lexiscan stress test for further evaluation    Hyperlipidemia  · Lipid panel 03/07/2019 showed total cholesterol 226, triglycerides 104, HDL 43,    · Currently on atorvastatin 40 mg daily   · Repeat lipid panel prior to next office visit    Bilateral carotid artery stenosis, left vertebral artery occlusion, lower extremity claudication  · He has a CTA head and neck pending  · Continue follow-up with vascular surgery and Neurology    Assessment & plan notes cannot be loaded without a specified hospital service         Orders Placed This Encounter   Procedures    NM myocardial perfusion spect (rx stress and/or rest)    AMB extended holter monitor    POCT ECG       No follow-ups on file  History of Present Illness   79year-old gentleman comes in as a new patient for evaluation of palpitations and dizziness, after being referred by vascular surgery Dr Chantal Oliveros  Over the past few months, patient reports having intermittent episodes of dizziness and "lack of equilibrium", which has been occurring more frequently  He seems to report 2 different types of dizziness, 1 of which happens when he bends forward and gets up or with changes in position  The other form of dizziness seems to happen even at rest   During 1 such episode, while he was trying to get testing completed for MRI/MRA, he was driving to get a test done, and had to stop by the side of the road due to feeling dizzy and having a sense of dysequilibrium  He could not continue driving and had to call EMS who took him came to the ED at [facility] for further evaluation  After brief testing in the ED, he was discharged with outpatient follow-up     he has continued to have intermittent dizziness since then, and followed of with vascular surgery Dr Oliveros In late December, and was referred to see Cardiology and neurology for further evaluation of same    Historical Information   Past Medical History:   Diagnosis Date    High blood pressure      History reviewed  No pertinent surgical history  Family History   Problem Relation Age of Onset    Stroke Mother     Stroke Father      Current Outpatient Medications on File Prior to Visit   Medication Sig Dispense Refill    aspirin (ECOTRIN LOW STRENGTH) 81 mg EC tablet Take 81 mg by mouth daily      atorvastatin (LIPITOR) 40 mg tablet Take 40 mg by mouth daily with dinner      Azilsartan-Chlorthalidone 40-12 5 MG TABS Take by mouth       No current facility-administered medications on file prior to visit        Allergies   Allergen Reactions    Butalbital-Apap-Caffeine      Hands swell/redness     Social History     Socioeconomic History    Marital status: /Civil Union     Spouse name: None    Number of children: None    Years of education: None    Highest education level: None   Occupational History    None   Social Needs    Financial resource strain: None    Food insecurity     Worry: None     Inability: None    Transportation needs     Medical: None     Non-medical: None   Tobacco Use    Smoking status: Former Smoker     Packs/day: 0 25     Types: Cigarettes     Quit date: 2020     Years since quittin 1    Smokeless tobacco: Never Used   Substance and Sexual Activity    Alcohol use: Not Currently     Frequency: 4 or more times a week     Drinks per session: 3 or 4     Comment: Stopped drinking 4 days ago     Drug use: Never    Sexual activity: None   Lifestyle    Physical activity     Days per week: None     Minutes per session: None    Stress: None   Relationships    Social connections     Talks on phone: None     Gets together: None     Attends Orthodox service: None     Active member of club or organization: None     Attends meetings of clubs or organizations: None     Relationship status: None    Intimate partner violence     Fear of current or ex partner: None     Emotionally abused: None     Physically abused: None     Forced sexual activity: None   Other Topics Concern    None   Social History Narrative    None        Review of Systems   Neurological: Positive for dizziness  All other systems reviewed and are negative  Vitals:  Vitals:    21 1326   BP: 136/82   Pulse: 74   Weight: 89 8 kg (198 lb)   Height: 5' 10" (1 778 m)     BMI - Body mass index is 28 41 kg/m²  Wt Readings from Last 7 Encounters:   21 89 8 kg (198 lb)   20 91 2 kg (201 lb)   20 90 7 kg (200 lb)       Physical Exam  Vitals signs and nursing note reviewed  Constitutional:       General: He is not in acute distress  Appearance: Normal appearance  He is well-developed   He is not ill-appearing or diaphoretic  HENT:      Head: Normocephalic and atraumatic  Nose: No congestion  Eyes:      General: No scleral icterus  Conjunctiva/sclera: Conjunctivae normal    Neck:      Musculoskeletal: Neck supple  No muscular tenderness  Vascular: No carotid bruit or JVD  Cardiovascular:      Rate and Rhythm: Normal rate and regular rhythm  Heart sounds: Normal heart sounds  No murmur  No friction rub  No gallop  Pulmonary:      Effort: Pulmonary effort is normal  No respiratory distress  Breath sounds: Normal breath sounds  No wheezing or rales  Chest:      Chest wall: No tenderness  Abdominal:      General: There is no distension  Palpations: Abdomen is soft  Tenderness: There is no abdominal tenderness  Musculoskeletal:         General: No swelling, tenderness or deformity  Right lower leg: No edema  Left lower leg: No edema  Skin:     General: Skin is warm  Neurological:      Mental Status: He is alert and oriented to person, place, and time  Mental status is at baseline  Psychiatric:         Mood and Affect: Mood normal          Behavior: Behavior normal          Thought Content:  Thought content normal        Labs:  CBC:   Lab Results   Component Value Date    WBC 6 56 03/07/2019    RBC 5 46 03/07/2019    HGB 16 9 03/07/2019    HCT 53 0 (H) 03/07/2019    MCV 97 03/07/2019     03/07/2019    RDW 12 2 03/07/2019       CMP:   Lab Results   Component Value Date    K 4 2 12/22/2020     12/22/2020    CO2 30 12/22/2020    ANIONGAP 13 04/23/2014    BUN 28 (H) 12/22/2020    CREATININE 1 50 (H) 12/22/2020    EGFR 46 12/22/2020    CALCIUM 9 8 12/22/2020    AST 24 03/07/2019    ALT 42 03/07/2019    ALKPHOS 100 03/07/2019       Magnesium:  No results found for: MG    Lipid Profile:   Lab Results   Component Value Date    HDL 43 03/07/2019    TRIG 104 03/07/2019    LDLCALC 162 (H) 03/07/2019       Thyroid Studies:   Lab Results   Component Value Date LVN9QYRJNQIT 2 390 03/07/2019    O7EQJJK 1 00 03/07/2019    V6RZRAM 5 4 03/07/2019       No components found for: HGA1C    No results found for: CRA2    Imaging: Vas Abdominal Aorta/iliacs; Complete Study    Result Date: 12/10/2020  Narrative:  [facility]PORT [facility]IN[facility]: Indications:  Claudication [I70 219]  Evaluate for progression of Aorto-Iliac occlusive disease  Patient reports bilateral lower extremity pain after walking 1-2 blocks  Risk Factors The patient has history of HTN and previous smoking (quit <1yr ago)  Clinical Right Pressure:  142/ mm Hg, Left Pressure:  136/ mm Hg  FINDINGS:  Unilateral     Impression  PSV (cm/s)  EDV (cm/s)  AP (cm)  Sup-Bertha Ao                         79                  2 1  Px Inf-Farshad Ao                      82                  2 1  Ds Inf-Farshad Ao                      73          84      1 8  Celiac                            149          37           Prox  SMA      >70%               328          34            Right           PSV (cm/s)  EDV (cm/s)  AP (cm)  Prox ALICIA                89                  1 5  Dist ALICIA               112                       Internal Iliac          60                       Prox  EIA              123                  1 0  Dist EIA               216                  1 2  Prox Renal             124          26            Left            Impression  PSV (cm/s)  EDV (cm/s)  AP (cm)  Prox ALICIA        Ectatic             80                  1 6  Dist ALICIA                            78          24      1 3  Internal Iliac                      56                       Prox  EIA                          124                       Dist EIA                           135                  1 3  Prox Renal                         183          53              CONCLUSION:  Impression  The aorta is patent and normal in caliber measuring 2 1 cm in its greatest diameter  The left common iliac artery is ectatic measuring 1 6 cm   The left common iliac artery and bilateral external iliac arteries are patent and normal in caliber  >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  The celiac and bilateral proximal renal arteries are patent  Ankle/Brachial indices are: Rt - 1 08 and Lt - 0 60, severe claudication range  Great toe pressures: Rt - 32 mmHg within the healing range and Lt - flat lined  No prior study for comparison  SIGNATURE: Electronically Signed by: Elian Pelayo MD on 2020-12-10 11:18:18 AM    Vas Carotid Complete Study    Result Date: 12/10/2020  Narrative:  [facility]PORT [facility]INICAL: Indications: Patient presents with a carotid disease multiple cardiovascular risk factors  Recent MRA shows right carotid occlusion  Patient is asymptomatic  Risk Factors The patient has history of HTN and previous smoking (quit <1yr ago)  Clinical Right Pressure:  142/ mm Hg, Left Pressure:  136/ mm Hg  FINDINGS:  Right        Impression  PSV  EDV (cm/s)  Direction of Flow  Ratio  Dist  ICA                 74          38                      0 78  Mid  ICA     50 - 69%    135          28                      1 42  Prox  ICA    70 - 99%    401         208                      4 22  Dist CCA                  70          23                            Mid CCA                   95          28                      0 90  Prox CCA                 105          29                            Ext Carotid              131          29                      1 38  Prox Vert                 44          20  Antegrade                 Subclavian               109           0                             Left         Impression  PSV  EDV (cm/s)  Direction of Flow  Ratio  Dist  ICA                 60          24                      0 60  Mid  ICA                  80          37                      0 80  Prox   ICA    1 - 49%     122          51                      1 22  Dist CCA                 104          35                            Mid CCA                  100          33 0 95  Prox CCA                 106          33                            Ext Carotid               94          29                      0 94  Prox Vert    Resistive    33           0  Antegrade                 Subclavian               119           0                               CONCLUSION:  Impression RIGHT: There is 70-99% stenosis noted in the internal carotid artery  Plaque is heterogenous, calcified and irregular  Vertebral artery flow is antegrade  There is no significant subclavian artery disease  LEFT: There is <50% stenosis noted in the internal carotid artery  Plaque is heterogenous, calcified and irregular  Vertebral artery flow is antegrade and resistive  There is no significant subclavian artery disease  No prior study for comparison  Recommend repeat testing in 6month as per protocol unless otherwise indicated  Internal carotid artery stenosis determination by consensus criteria from: Rustam Miranda et al  Carotid Artery Stenosis: Gray-Scale and Doppler US Diagnosis - Society of Radiologists in [facility] [address], Radiology 2003; [phone]  SIGNATURE: Electronically Signed by: León Bustamante MD on 2020-12-10 11:20:11 AM    Vas Lower Limb Arterial Duplex, Complete Bilateral    Result Date: 12/10/2020  Narrative:  [facility] REPORT [facility]INICAL: Indications: Atherosclerosis with Claudication [I70 219]  Patient presents with bilateral lower extremity pain after walking 1-2 blocks  Denies any open wounds or ulcers at this time  Risk Factors The patient has history of HTN and previous smoking (quit <1yr ago)  Clinical Right Pressure:  142/ mm Hg, Left Pressure:  136/ mm Hg    FINDINGS:  Segment                Right            Left                                          PSV (cm/s)  EDV  Impression       PSV (cm/s)  EDV  Common Femoral Artery          95    0                           80    0  Prox Profunda                  77    0                           52 Dist  Profunda                                                         0  Prox SFA                       94    7                           97    0  Mid SFA                        66    0                           73    0  Dist SFA                       67    0  Diffuse Disease          61    0  Proximal Pop                   57    0                           30    0  Distal Pop                     38    0                           37    0  Tibioperoneal                  38                                30       Dist Post Tibial               36    0                           11    0  Prox  Ant  Tibial              55    0                           35    0  Dist  Ant  Tibial                                                25    2  Dist Peroneal                  82    0  Not visualized                       CONCLUSION:  Impression: RIGHT LOWER LIMB: This resting evaluation shows mild diffuse arterial disease without significant focal stenosis  Ankle/Brachial index: 1 08 which is in the normal disease category  PVR/ PPG tracings are slightly dampened  Metatarsal pressure of 60 mmHg Great toe pressure of 32 mmHg, within the healing range  LEFT LOWER LIMB: This resting evaluation shows mild diffuse arterial disease without significant focal stenosis  There is evidence of tibio-peroneal disease with occlusion vs  high grade stenosis of the peroneal artery and monophasic flow in the distal posterior tibial artery  Ankle/Brachial index: 0 60  which is in the severe disease category  PVR/ PPG tracings are dampened  Metatarsal and great toe pressure not audible  SIGNATURE: Electronically Signed by: Reinier Nina MD on 2020-12-10 11:20:50 AM      Cardiac testing:   No results found for this or any previous visit  No results found for this or any previous visit  No results found for this or any previous visit  No results found for this or any previous visit

## 2021-01-09 PROCEDURE — 93000 ELECTROCARDIOGRAM COMPLETE: CPT | Performed by: INTERNAL MEDICINE

## 2021-01-18 ENCOUNTER — HOSPITAL ENCOUNTER (OUTPATIENT)
Dept: INFUSION CENTER | Facility: CLINIC | Age: 71
Discharge: HOME | End: 2021-01-18
Payer: MEDICARE

## 2021-01-18 ENCOUNTER — HOSPITAL ENCOUNTER (OUTPATIENT)
Dept: CT IMAGING | Facility: HOSPITAL | Age: 71
Discharge: HOME | End: 2021-01-18
Attending: SURGERY
Payer: MEDICARE

## 2021-01-18 VITALS
TEMPERATURE: 97.6 F | WEIGHT: 198.85 LBS | SYSTOLIC BLOOD PRESSURE: 145 MMHG | HEART RATE: 67 BPM | BODY MASS INDEX: 28.53 KG/M2 | DIASTOLIC BLOOD PRESSURE: 74 MMHG | OXYGEN SATURATION: 98 % | RESPIRATION RATE: 20 BRPM

## 2021-01-18 DIAGNOSIS — I65.02 VERTEBRAL ARTERY OCCLUSION, LEFT: ICD-10-CM

## 2021-01-18 DIAGNOSIS — Q28.2 CEREBRAL ARTERIOVENOUS MALFORMATION (AVM): ICD-10-CM

## 2021-01-18 DIAGNOSIS — N18.31 STAGE 3A CHRONIC KIDNEY DISEASE (HCC): Primary | ICD-10-CM

## 2021-01-18 DIAGNOSIS — I65.23 CAROTID STENOSIS, BILATERAL: ICD-10-CM

## 2021-01-18 DIAGNOSIS — I65.21 RIGHT CAROTID ARTERY OCCLUSION: ICD-10-CM

## 2021-01-18 PROCEDURE — G1004 CDSM NDSC: HCPCS

## 2021-01-18 PROCEDURE — 70496 CT ANGIOGRAPHY HEAD: CPT

## 2021-01-18 PROCEDURE — 70498 CT ANGIOGRAPHY NECK: CPT

## 2021-01-18 RX ORDER — SODIUM CHLORIDE 9 MG/ML
114 INJECTION, SOLUTION INTRAVENOUS CONTINUOUS
Status: CANCELLED | OUTPATIENT
Start: 2021-01-18

## 2021-01-18 RX ORDER — SODIUM CHLORIDE 9 MG/ML
274 INJECTION, SOLUTION INTRAVENOUS CONTINUOUS
Status: CANCELLED | OUTPATIENT
Start: 2021-01-18

## 2021-01-18 RX ORDER — SODIUM CHLORIDE 9 MG/ML
274 INJECTION, SOLUTION INTRAVENOUS CONTINUOUS
Status: DISPENSED | OUTPATIENT
Start: 2021-01-18 | End: 2021-01-18

## 2021-01-18 RX ORDER — SODIUM CHLORIDE 9 MG/ML
114 INJECTION, SOLUTION INTRAVENOUS CONTINUOUS
Status: DISCONTINUED | OUTPATIENT
Start: 2021-01-18 | End: 2021-01-18 | Stop reason: HOSPADM

## 2021-01-18 RX ADMIN — SODIUM CHLORIDE 274 ML/HR: 0.9 INJECTION, SOLUTION INTRAVENOUS at 08:20

## 2021-01-18 RX ADMIN — SODIUM CHLORIDE 114 ML/HR: 0.9 INJECTION, SOLUTION INTRAVENOUS at 10:37

## 2021-01-18 RX ADMIN — IOHEXOL 85 ML: 350 INJECTION, SOLUTION INTRAVENOUS at 10:19

## 2021-01-18 NOTE — PROGRESS NOTES
Pt presents for IV fluids  Pt reported that he did not eat or drink anything prior to today's appointment  Pt tolerated pre and post CT hydration without incident  AVS printed  Therapy plan complete, no future appointments scheduled

## 2021-01-19 ENCOUNTER — TELEPHONE (OUTPATIENT)
Dept: VASCULAR SURGERY | Facility: CLINIC | Age: 71
End: 2021-01-19

## 2021-01-19 ENCOUNTER — TELEPHONE (OUTPATIENT)
Dept: NEUROLOGY | Facility: CLINIC | Age: 71
End: 2021-01-19

## 2021-01-19 NOTE — TELEPHONE ENCOUNTER
Per Deandre Pinto @ Radiology Reading Room, there are significant findings seen on CTA head/neck done yesterday  Patient is scheduled to see Dr Oliveros on 1/21/21 to review results  Please advise if anything else is needed

## 2021-01-19 NOTE — TELEPHONE ENCOUNTER
Chart and imaging study reviewed and as anticipated  Patient should keep scheduled appointment with Dr Leonard Foster Doctor this week as scheduled  Thanks

## 2021-01-20 ENCOUNTER — TELEPHONE (OUTPATIENT)
Dept: VASCULAR SURGERY | Facility: CLINIC | Age: 71
End: 2021-01-20

## 2021-01-21 ENCOUNTER — OFFICE VISIT (OUTPATIENT)
Dept: VASCULAR SURGERY | Facility: CLINIC | Age: 71
End: 2021-01-21
Payer: MEDICARE

## 2021-01-21 ENCOUNTER — TELEPHONE (OUTPATIENT)
Dept: VASCULAR SURGERY | Facility: CLINIC | Age: 71
End: 2021-01-21

## 2021-01-21 VITALS
RESPIRATION RATE: 17 BRPM | HEIGHT: 70 IN | TEMPERATURE: 98.4 F | DIASTOLIC BLOOD PRESSURE: 80 MMHG | BODY MASS INDEX: 28.35 KG/M2 | WEIGHT: 198 LBS | SYSTOLIC BLOOD PRESSURE: 120 MMHG | HEART RATE: 61 BPM

## 2021-01-21 DIAGNOSIS — I65.23 ASYMPTOMATIC BILATERAL CAROTID ARTERY STENOSIS: Primary | ICD-10-CM

## 2021-01-21 DIAGNOSIS — Q28.2 CEREBRAL ARTERIOVENOUS MALFORMATION (AVM): ICD-10-CM

## 2021-01-21 DIAGNOSIS — I70.219 ATHEROSCLEROSIS OF LOWER EXTREMITY WITH CLAUDICATION (HCC): ICD-10-CM

## 2021-01-21 DIAGNOSIS — R00.2 PALPITATIONS: ICD-10-CM

## 2021-01-21 DIAGNOSIS — N18.31 STAGE 3A CHRONIC KIDNEY DISEASE (HCC): ICD-10-CM

## 2021-01-21 DIAGNOSIS — R42 DIZZINESS: ICD-10-CM

## 2021-01-21 DIAGNOSIS — I65.02 VERTEBRAL ARTERY OCCLUSION, LEFT: ICD-10-CM

## 2021-01-21 PROCEDURE — 99215 OFFICE O/P EST HI 40 MIN: CPT | Performed by: SURGERY

## 2021-01-21 RX ORDER — SODIUM CHLORIDE 9 MG/ML
125 INJECTION, SOLUTION INTRAVENOUS CONTINUOUS
Status: CANCELLED | OUTPATIENT
Start: 2021-01-21

## 2021-01-21 RX ORDER — CHLORHEXIDINE GLUCONATE 0.12 MG/ML
15 RINSE ORAL ONCE
Status: CANCELLED | OUTPATIENT
Start: 2021-01-21 | End: 2021-01-21

## 2021-01-21 RX ORDER — CEFAZOLIN SODIUM 2 G/50ML
2000 SOLUTION INTRAVENOUS ONCE
Status: CANCELLED | OUTPATIENT
Start: 2021-01-21 | End: 2021-01-21

## 2021-01-21 NOTE — PROGRESS NOTES
Assessment/Plan:    Pt is a 70 yo M w/ cerebral AVM, HTN, presents with episodes of dizziness and evaluation of carotid disease    Asymptomatic bilateral carotid artery stenosis  -     Case request operating room: Right carotid endarterectomy with patch angioplasty; Standing  -     PAT Covid Screening; Future  -     Basic metabolic panel; Future  -     CBC and Platelet; Future  -     Protime-INR; Future  -     Ambulatory referral to Cardiology;  Future  -     Case request operating room: Right carotid endarterectomy with patch angioplasty  -MRA of the neck is obscured by significant motion artifact and cannot assess the cervical carotid arteries appropriately  -reviewed DU which shows R ICA >70% stenosis w/ mark 401/208 and ratio 4 22 and L ICA <50%; B verts antegrade w/ resistive flow on the L  -reviewed CTA neck which shows near occlusive stenosis of the R ICA and about 50% stenosis of the L ICA; the L vertebral is occluded distally  -discussed results of CTA; prior OSH imaging suggested distal carotid occlusion but this is not the case on his recent CTA; he does have severe R ICA stenosis and somewhat vague although possible symptoms of R eye blurriness and some L finger numbness; I have recommended intervention with R CEA and patient is agreeable; discussed the procedure in detail including risks and benefits and all questions answered  -referred to neurology at last visit for assistance in determining etiology of all of his symptoms; currently scheduled for this appointment in March (first available); will try to move this up to before his surgery  -plan for R CEA  -labs, cards clearance (holter complete but no results yet and nec stress scheduled for tomorrow)    Cerebral arteriovenous malformation (AVM)  Vertebral artery occlusion, left  -known AVM and L vert occlusion again observed on recent imaging but patient does not f/u anywhere for this  -as these are intracerebral, these findings are not treated by me as a vascular surgeon; have referred patient to neurosurgery for further evaluation and care; scheduled for appt Jan 29th    Atherosclerosis of lower extremity with claudication ([facility] ([place] [address] )  -reviewed LEADs which shows R: 1 08/60/32 and L: 0 6/-/- w/ diffuse fem/pop disease and severe L tibial disease  -he is mildly symptomatic from this (LLE claudication at long distances but hasn't been walking recently)  -will continue surveillance with LEADs yearly (order next visit); if symptoms worsen, would consider angiogram    Stage 3a chronic kidney disease  -last Cr: 1 5 GFR: 46 (media)    Palpitations  Dizziness  -continues to have episodes of dizziness without known etiology and having palpitations  -referred to cardiology for evaluation of arrhythmia as possible source of dizziness; workup ongoing    Medications  -continue aspirin and statin for best medical management    Other orders  -     Diet NPO; Sips with meds; Standing  -     Void on call to OR; Standing  -     Insert peripheral IV; Standing  -     Nursing Communication CHG bath, have staff wash entire body (neck down) per pre op bathing protocol  Routine, evening prior to, and day of surgery ; Standing  -     Nursing Communication Swab both nares with Povidone-Iodine solution, EXCLUDE if patient has shellfish/Iodine allergy  Routine, day of surgery, on call to OR ; Standing  -     chlorhexidine (PERIDEX) 0 12 % oral rinse 15 mL  -     Shower/scrub; Standing  -     Insert urinary catheter (In Operative Area Only);  Standing  -     sodium chloride 0 9 % infusion  -     ceFAZolin (ANCEF) IVPB (premix in dextrose) 2,000 mg 50 mL  -     Place sequential compression device; Standing      Operative Scheduling Information:    Hospital:  [facility]    Physician:  Me    Surgery: R CEA    Urgency:  Standard    Level:  Level 3: Outpatients to be scheduled for elective surgery than can be delayed up to 4 weeks without reasonable expectation of detriment to patient    Case Length:  Normal    Post-op Bed:  Stepdown    OR Table:  Standard    Equipment Needs:  None    Medication Instructions:  Aspirin:   Continue (do not hold)    Hydration:  No      Subjective:      Patient ID: Orlando Rosario is a 79 y o  male  HPI:    Patient presents to review carotid disease and imaging  Patient notes episodes of dizziness and lightheadedness  These last about 2-3 minutes and then resolve or as long as 7 minutes  This started about 2 months ago  Prior to last visit, he had an episode of dizziness while driving to his MRI and had to get transported by EMS to the hospital     Per patient bloodwork and EKG was normal   Since last visit, he did a Holter monitor (just completed and sent back) and he is scheduled for nuclear stress test tomorrow  He reports a problem last year when he felt like he was "being pulled to the left"  His balance was off  This lasted about 2 weeks  Denies amaurosis but does report intermittent episodes of blurriness of the right eye lasting 3-5 minutes  This happens about once per week  This occurs during the dizziness episodes  Denies facial droop, denies unilateral weakness/numbness  Does report numbness of the L 4th and 5th digits  This has been going on for about 2 years and happens about 1x/mo and lasts minutes  He notes claudication of the L calf at about 10min or 1/4mile of walking  Denies rest pain or wounds    3/4PPD smoker  Quit 3 days before last visit  Admits to smoking a few cigarettes since then but not daily  Since last visit, he continues to have dizziness episodes but is feeling better this past week  These are mostly related to change in position  This self resolves quickly  Having episodes about 2x/wk  Denies any further episodes of eye blurriness         The following portions of the patient's history were reviewed and updated as appropriate: allergies, current medications, past family history, past medical history, past social history, past surgical history and problem list     Review of Systems   Constitutional: Negative  HENT: Negative  Eyes: Positive for visual disturbance (R eye blurriness)  Respiratory: Negative  Negative for shortness of breath  Cardiovascular: Negative  Negative for chest pain  Gastrointestinal: Negative  Endocrine: Negative  Genitourinary: Negative  Musculoskeletal: Negative  Negative for gait problem  Skin: Negative  Negative for wound  Allergic/Immunologic: Negative  Neurological: Positive for dizziness  Negative for facial asymmetry, speech difficulty, weakness and numbness  Hematological: Negative  Psychiatric/Behavioral: Negative  Objective:      /80 (BP Location: Left arm, Patient Position: Sitting, Cuff Size: Adult)   Pulse 61   Temp 98 4 °F (36 9 °C) (Tympanic)   Resp 17   Ht 5' 10" (1 778 m)   Wt 89 8 kg (198 lb)   BMI 28 41 kg/m²          Physical Exam  Vitals signs and nursing note reviewed  Constitutional:       Appearance: He is well-developed  HENT:      Head: Normocephalic and atraumatic  Eyes:      Conjunctiva/sclera: Conjunctivae normal    Neck:      Musculoskeletal: Normal range of motion and neck supple  Cardiovascular:      Rate and Rhythm: Regular rhythm  Bradycardia present  Pulses:           Carotid pulses are on the right side with bruit  Radial pulses are 2+ on the right side and 2+ on the left side  Femoral pulses are 0 on the right side and 0 on the left side  Popliteal pulses are 0 on the right side and 0 on the left side  Dorsalis pedis pulses are 0 on the right side and 0 on the left side  Posterior tibial pulses are 0 on the right side and 0 on the left side  Heart sounds: Normal heart sounds  No murmur  Comments: No L carotid bruits  Pulmonary:      Effort: Pulmonary effort is normal  No respiratory distress  Breath sounds: Normal breath sounds   No wheezing or rales    Abdominal:      General: There is no distension  Palpations: Abdomen is soft  Tenderness: There is no abdominal tenderness  There is no rebound  Musculoskeletal: Normal range of motion  Right lower le+ Edema present  Left lower le+ Edema present  Skin:     General: Skin is warm and dry  Neurological:      Mental Status: He is alert and oriented to person, place, and time  Psychiatric:         Behavior: Behavior normal            I have reviewed and made appropriate changes to the review of systems input by the medical assistant      Vitals:    21 1313   BP: 120/80   BP Location: Left arm   Patient Position: Sitting   Cuff Size: Adult   Pulse: 61   Resp: 17   Temp: 98 4 °F (36 9 °C)   TempSrc: Tympanic   Weight: 89 8 kg (198 lb)   Height: 5' 10" (1 778 m)       Patient Active Problem List   Diagnosis    Cerebral arteriovenous malformation (AVM)    Dizziness    Asymptomatic bilateral carotid artery stenosis    Atherosclerosis of lower extremity with claudication (HCC)    Vertebral artery occlusion, left    Stage 3a chronic kidney disease    Palpitations       Past Surgical History:   Procedure Laterality Date    LITHOTRIPSY         Family History   Problem Relation Age of Onset    Stroke Mother     Stroke Father        Social History     Socioeconomic History    Marital status: /Civil Union     Spouse name: Not on file    Number of children: Not on file    Years of education: Not on file    Highest education level: Not on file   Occupational History    Not on file   Social Needs    Financial resource strain: Not on file    Food insecurity     Worry: Not on file     Inability: Not on file    Transportation needs     Medical: Not on file     Non-medical: Not on file   Tobacco Use    Smoking status: Former Smoker     Packs/day: 0 25     Types: Cigarettes     Quit date: 2020     Years since quittin 2    Smokeless tobacco: Never Used Substance and Sexual Activity    Alcohol use: Not Currently     Frequency: 4 or more times a week     Drinks per session: 3 or 4     Comment: Stopped drinking 4 days ago     Drug use: Never    Sexual activity: Not on file   Lifestyle    Physical activity     Days per week: Not on file     Minutes per session: Not on file    Stress: Not on file   Relationships    Social connections     Talks on phone: Not on file     Gets together: Not on file     Attends Restorationism service: Not on file     Active member of club or organization: Not on file     Attends meetings of clubs or organizations: Not on file     Relationship status: Not on file    Intimate partner violence     Fear of current or ex partner: Not on file     Emotionally abused: Not on file     Physically abused: Not on file     Forced sexual activity: Not on file   Other Topics Concern    Not on file   Social History Narrative    Not on file       Allergies   Allergen Reactions    Butalbital-Apap-Caffeine      Hands swell/redness         Current Outpatient Medications:     aspirin (ECOTRIN LOW STRENGTH) 81 mg EC tablet, Take 81 mg by mouth daily, Disp: , Rfl:     atorvastatin (LIPITOR) 40 mg tablet, Take 40 mg by mouth daily with dinner, Disp: , Rfl:     Azilsartan-Chlorthalidone 40-12 5 MG TABS, Take by mouth, Disp: , Rfl:

## 2021-01-21 NOTE — TELEPHONE ENCOUNTER
Placed on board and in clearance drawer  TWO CLEARANCES NEEDED  Pt was recently seen by cardiology, Dr Erendira Carmona on 1/6/21, clearance form was faxed  Neurology clearance by Dr Dariela Sarabia scheduled for 3/19/21

## 2021-01-21 NOTE — PATIENT INSTRUCTIONS
1) Carotid disease  -we saw some blockages in the carotid arteries in the neck; the right side is worse than the left; the cat scan confirmed this finding and gave additional details for surgery planning  -we are planning a procedure called carotid endarterectomy to treat this    2) AV malformation  -you also have some blockages in the arteries in the brain, and an AVM, but this is not something that I treat as a vascular surgeon  -I have referred you to a neurosurgeon to see you for this blockages in the arteries in the brain as well as the AVM that you already know that you have    3) Medications  -please continue taking an aspirin a day for blockages in the blood vessels    4) PAD  -you have some blockages in the arteries in the left leg that were confirmed on your ultrasound  -this is likely the cause of the tightness in the calf when you walk  -we can discuss treating this later if it becomes very bothersome    5) Dizziness  -please continue to look for causes of dizziness with your primary doctor and cardiologist    6) Smoking  -good job stopping smoking! Please keep this up! Carotid Endarterectomy   WHAT YOU NEED TO KNOW:   Carotid endarterectomy (CEA) is a surgery done to remove plaque (fatty deposits) from inside your carotid artery  The carotid artery is a blood vessel found in both sides of your neck  Plaque may build up inside your carotid artery and decrease blood flow to your brain  A piece of plaque may also break free and cause a stroke  HOW TO PREPARE:   Before your surgery:   · Ask your healthcare provider if you need to take aspirin or a blood-thinning medicine before your surgery  This medicine will help stop clots from forming in your blood  · Bring your medicine bottles or a list of your medicines when you see your healthcare provider  Tell your provider if you are allergic to any medicine  Tell your provider if you use any herbs, food supplements, or over-the-counter medicine  · Tell your healthcare provider if you have any other medical conditions, such as diabetes or heart, lung, or kidney disease  · Your healthcare provider may have you do certain tests to check your brain function  You may be asked to draw, memorize and remember items, or do certain movements  · You may need to have blood tests done before your surgery  Imaging tests, such as an ultrasound, a CT scan, or MRI, may also be done  Tell the healthcare provider if you have ever had an allergic reaction to contrast dye  Do not enter the MRI room with anything metal  Metal can cause serious injury  Tell the healthcare provider if you have any metal in or on your body  The imaging tests may be done to check the plaques in your carotid artery  You may also need an angiography to check the blood flow from your carotid artery to your brain  Write down the date, time, and location of each test     · Write down the correct date, time, and location of your procedure  · You or a close family member will be asked to sign a legal document called a consent form  It gives healthcare providers permission to do the procedure or surgery  It also explains the problems that may happen, and your choices  Make sure all your questions are answered before you sign this form  The night before your surgery:   · Ask your healthcare provider about directions for eating and drinking  The day of your surgery:   · Healthcare providers may insert an intravenous tube (IV) into your vein  A vein in the arm is usually chosen  Through the IV tube, you may be given liquids and medicine  · An anesthesiologist will talk to you before your surgery  You may need medicine to keep you asleep or numb an area of your body during surgery   Tell healthcare providers if you or anyone in your family has had a problem with anesthesia in the past     WHAT WILL HAPPEN:   What will happen:   · You will be given anesthesia to keep you free from pain during your surgery  Your blood pressure, temperature, and heart rate will be monitored during surgery  Your skin over your carotid artery will be cleaned  An incision is made to open your neck and reach your carotid artery  Your carotid artery may be clamped in certain areas to stop your blood from flowing through it  Your carotid artery will then be cut open and the plaque will be removed  · Once the plaque is removed, your artery will be closed or put back together with stitches  A man-made patch or a piece of your own vein may also be used to close your artery  The clamps will be removed and the blood flow through your carotid artery will be checked  A drain may be put under your skin to remove extra blood and fluid from around your artery  Your skin will be closed with stitches and covered with a bandage  The bandage will help keep your surgery area clean and dry to prevent infection  After your surgery: You may be taken to a recovery room or your hospital room where healthcare providers will check you often  You may be attached to monitors that your healthcare providers can watch outside your room  Healthcare providers may remove your bandage soon after your surgery to check your incision  Your head will be kept straight for 24 hours after surgery to reduce side effects, such as swelling and fainting The head of your bed may also be slightly raised  Do not get out of bed until your healthcare provider says it is okay  CONTACT YOUR HEALTHCARE PROVIDER IF:   · You have a fever, vomiting, diarrhea, or other signs of illness  SEEK CARE IMMEDIATELY IF:   You have any of the following signs of a stroke:   · Numbness or drooping on one side of your face     · Weakness in an arm or leg    · Confusion or difficulty speaking    · Dizziness, a severe headache, or vision loss  RISKS:   · During your CEA, muscles or nerves near your carotid artery may be damaged   Nerve damage may lead to changes in how you look, or trouble chewing and swallowing  You may bleed more than expected or get an infection  You may have headaches, increased blood pressure, and abnormal heartbeats  Swelling of your surgery site may occur, which could block your airway  A fistula (abnormal connection) may form, which connects your carotid artery to another tissue  You are also at risk of having a heart attack during or after surgery, and this can be life-threatening  · You may get a blood clot in your leg, arm, or carotid artery  The clot may travel to your heart or brain and cause life-threatening problems, such as a heart attack or stroke  After a CEA, your carotid artery may become narrow or full of plaque again  The patch used for your surgery may tear or loosen, causing a large amount of blood loss  People who smoke, have diabetes, or kidney, lung, or heart disease are at a higher risk of problems  If you do not have the surgery, you are at risk for a stroke, which may be life-threatening  CARE AGREEMENT:   You have the right to help plan your care  Learn about your health condition and how it may be treated  Discuss treatment options with your healthcare providers to decide what care you want to receive  You always have the right to refuse treatment  © Copyright [address] 2018 Information is for End User's use only and may not be sold, redistributed or otherwise used for commercial purposes  All illustrations and images included in CareNotes® are the copyrighted property of [employer] Inc  or [employer]  The above information is an  only  It is not intended as medical advice for individual conditions or treatments  Talk to your doctor, nurse or pharmacist before following any medical regimen to see if it is safe and effective for you

## 2021-01-25 ENCOUNTER — HOSPITAL ENCOUNTER (OUTPATIENT)
Dept: NON INVASIVE DIAGNOSTICS | Facility: CLINIC | Age: 71
Discharge: HOME | End: 2021-01-25
Payer: MEDICARE

## 2021-01-25 DIAGNOSIS — I44.7 LEFT BUNDLE BRANCH BLOCK (LBBB): ICD-10-CM

## 2021-01-25 DIAGNOSIS — R00.2 PALPITATIONS: ICD-10-CM

## 2021-01-25 DIAGNOSIS — R94.31 ABNORMAL ELECTROCARDIOGRAM (ECG) (EKG): ICD-10-CM

## 2021-01-25 DIAGNOSIS — R42 DIZZINESS: ICD-10-CM

## 2021-01-25 PROCEDURE — A9502 TC99M TETROFOSMIN: HCPCS

## 2021-01-25 PROCEDURE — 93017 CV STRESS TEST TRACING ONLY: CPT

## 2021-01-25 PROCEDURE — 78452 HT MUSCLE IMAGE SPECT MULT: CPT | Performed by: INTERNAL MEDICINE

## 2021-01-25 PROCEDURE — G1004 CDSM NDSC: HCPCS

## 2021-01-25 PROCEDURE — 78452 HT MUSCLE IMAGE SPECT MULT: CPT

## 2021-01-25 PROCEDURE — 93016 CV STRESS TEST SUPVJ ONLY: CPT | Performed by: INTERNAL MEDICINE

## 2021-01-25 PROCEDURE — 93018 CV STRESS TEST I&R ONLY: CPT | Performed by: INTERNAL MEDICINE

## 2021-01-25 RX ADMIN — REGADENOSON 0.4 MG: 0.08 INJECTION, SOLUTION INTRAVENOUS at 14:32

## 2021-01-27 ENCOUNTER — TELEPHONE (OUTPATIENT)
Dept: CARDIOLOGY CLINIC | Facility: CLINIC | Age: 71
End: 2021-01-27

## 2021-01-27 DIAGNOSIS — N18.31 STAGE 3A CHRONIC KIDNEY DISEASE (HCC): ICD-10-CM

## 2021-01-27 DIAGNOSIS — R94.39 ABNORMAL CARDIOVASCULAR STRESS TEST: Primary | ICD-10-CM

## 2021-01-27 LAB
ARRHY DURING EX: NORMAL
CHEST PAIN STATEMENT: NORMAL
MAX DIASTOLIC BP: 68 MMHG
MAX HEART RATE: 83 BPM
MAX PREDICTED HEART RATE: 150 BPM
MAX. SYSTOLIC BP: 118 MMHG
PROTOCOL NAME: NORMAL
REASON FOR TERMINATION: NORMAL
TARGET HR FORMULA: NORMAL
TEST INDICATION: NORMAL
TIME IN EXERCISE PHASE: NORMAL

## 2021-01-27 NOTE — TELEPHONE ENCOUNTER
Kait Albright MD  [facility]             Abnormal nuclear stress test, with some concerns for ischemia, although not definite based on my personal review  Normal LVEF   Regardless, considering his extensive risk factors, will need further evaluation with cardiac catheterization for more definitive assessment  Has mild CKD - cr 1 5, so will pre-hydration on day of cath  Called and spoke with patient and he wishes to proceed with catheterization  Order placed for labs and cath  Will fill out bed reservation for [facility] and fax to [place] and cath lab       Marshal Charm

## 2021-01-28 NOTE — TELEPHONE ENCOUNTER
Received call from patient in regards to his surgery with Dr Oliveros  I advised we are waiting on clearance from cardiology and neurology and I see that he has to be scheduled for a cardiac catheterization  Pt said he is aware of this and he is going to call cardiology and find out when that will be scheduled  He said Mike Galaviz had told him that his clearance dates were going to be moved up from March but I don't see a note to that respect so I am unsure if maybe there was nothing available to move him up sooner  He said he will find out about his cardiac cath and when it is and we will go from there

## 2021-01-28 NOTE — TELEPHONE ENCOUNTER
Per Dr Alonzo Minium 1/21/21 ov note":-referred to neurology at last visit for assistance in determining etiology of all of his symptoms; currently scheduled for this appointment in March (first available); will try to move this up to before his surgery  -plan for R CEA"     Confirmed w/ [facility] neurology clearance is not needed but Dr Brenda Giles would like pt seen by them prior to surgery, Dr Brenda Giles is aware pt's apt is 3/19/21, [facility] s/w neurology and pt is on a cancellation/wait list but they are unable to move apt up at this time  Cardiology is scheduling pt for cath

## 2021-01-29 ENCOUNTER — TRANSCRIBE ORDERS (OUTPATIENT)
Dept: NEUROSURGERY | Facility: CLINIC | Age: 71
End: 2021-01-29

## 2021-01-29 ENCOUNTER — CLINICAL SUPPORT (OUTPATIENT)
Dept: CARDIOLOGY CLINIC | Facility: CLINIC | Age: 71
End: 2021-01-29
Payer: MEDICARE

## 2021-01-29 ENCOUNTER — LAB (OUTPATIENT)
Dept: LAB | Facility: CLINIC | Age: 71
End: 2021-01-29
Payer: MEDICARE

## 2021-01-29 ENCOUNTER — CONSULT (OUTPATIENT)
Dept: NEUROSURGERY | Facility: CLINIC | Age: 71
End: 2021-01-29
Payer: MEDICARE

## 2021-01-29 ENCOUNTER — TRANSCRIBE ORDERS (OUTPATIENT)
Dept: ADMINISTRATIVE | Facility: HOSPITAL | Age: 71
End: 2021-01-29

## 2021-01-29 VITALS
DIASTOLIC BLOOD PRESSURE: 80 MMHG | WEIGHT: 198 LBS | SYSTOLIC BLOOD PRESSURE: 142 MMHG | RESPIRATION RATE: 16 BRPM | BODY MASS INDEX: 28.35 KG/M2 | TEMPERATURE: 98.8 F | HEART RATE: 64 BPM | HEIGHT: 70 IN

## 2021-01-29 DIAGNOSIS — Z01.818 PRE-PROCEDURAL EXAMINATION: Primary | ICD-10-CM

## 2021-01-29 DIAGNOSIS — I65.23 CAROTID STENOSIS, BILATERAL: ICD-10-CM

## 2021-01-29 DIAGNOSIS — R53.81 DEBILITY: ICD-10-CM

## 2021-01-29 DIAGNOSIS — R42 DIZZINESS: ICD-10-CM

## 2021-01-29 DIAGNOSIS — I65.02 VERTEBRAL ARTERY OCCLUSION, LEFT: ICD-10-CM

## 2021-01-29 DIAGNOSIS — Q28.2 CEREBRAL ARTERIOVENOUS MALFORMATION (AVM): Primary | ICD-10-CM

## 2021-01-29 DIAGNOSIS — R00.2 PALPITATIONS: ICD-10-CM

## 2021-01-29 DIAGNOSIS — I65.23 ASYMPTOMATIC BILATERAL CAROTID ARTERY STENOSIS: ICD-10-CM

## 2021-01-29 DIAGNOSIS — M25.50 PAIN IN JOINT, MULTIPLE SITES: ICD-10-CM

## 2021-01-29 DIAGNOSIS — R79.9 ABNORMAL BLOOD CHEMISTRY: ICD-10-CM

## 2021-01-29 DIAGNOSIS — A69.20 LYME DISEASE: ICD-10-CM

## 2021-01-29 DIAGNOSIS — E78.2 MIXED HYPERLIPIDEMIA: ICD-10-CM

## 2021-01-29 DIAGNOSIS — R79.9 ABNORMAL BLOOD CHEMISTRY: Primary | ICD-10-CM

## 2021-01-29 DIAGNOSIS — I44.7 LEFT BUNDLE BRANCH BLOCK (LBBB): ICD-10-CM

## 2021-01-29 LAB
ALBUMIN SERPL BCP-MCNC: 4.1 G/DL (ref 3.5–5)
ALP SERPL-CCNC: 99 U/L (ref 46–116)
ALT SERPL W P-5'-P-CCNC: 30 U/L (ref 12–78)
ANION GAP SERPL CALCULATED.3IONS-SCNC: 3 MMOL/L (ref 4–13)
AST SERPL W P-5'-P-CCNC: 18 U/L (ref 5–45)
BASOPHILS # BLD AUTO: 0.08 THOUSANDS/ΜL (ref 0–0.1)
BASOPHILS NFR BLD AUTO: 1 % (ref 0–1)
BILIRUB SERPL-MCNC: 1.07 MG/DL (ref 0.2–1)
BUN SERPL-MCNC: 23 MG/DL (ref 5–25)
CALCIUM SERPL-MCNC: 10.2 MG/DL (ref 8.3–10.1)
CHLORIDE SERPL-SCNC: 106 MMOL/L (ref 100–108)
CHOLEST SERPL-MCNC: 164 MG/DL (ref 50–200)
CO2 SERPL-SCNC: 29 MMOL/L (ref 21–32)
CREAT SERPL-MCNC: 1.61 MG/DL (ref 0.6–1.3)
EOSINOPHIL # BLD AUTO: 0.55 THOUSAND/ΜL (ref 0–0.61)
EOSINOPHIL NFR BLD AUTO: 8 % (ref 0–6)
ERYTHROCYTE [DISTWIDTH] IN BLOOD BY AUTOMATED COUNT: 12.9 % (ref 11.6–15.1)
GFR SERPL CREATININE-BSD FRML MDRD: 43 ML/MIN/1.73SQ M
GLUCOSE P FAST SERPL-MCNC: 106 MG/DL (ref 65–99)
HCT VFR BLD AUTO: 47.7 % (ref 36.5–49.3)
HDLC SERPL-MCNC: 38 MG/DL
HGB BLD-MCNC: 15.5 G/DL (ref 12–17)
IMM GRANULOCYTES # BLD AUTO: 0.02 THOUSAND/UL (ref 0–0.2)
IMM GRANULOCYTES NFR BLD AUTO: 0 % (ref 0–2)
INR PPP: 0.93 (ref 0.84–1.19)
LDLC SERPL CALC-MCNC: 111 MG/DL (ref 0–100)
LYMPHOCYTES # BLD AUTO: 1.95 THOUSANDS/ΜL (ref 0.6–4.47)
LYMPHOCYTES NFR BLD AUTO: 27 % (ref 14–44)
MCH RBC QN AUTO: 31.1 PG (ref 26.8–34.3)
MCHC RBC AUTO-ENTMCNC: 32.5 G/DL (ref 31.4–37.4)
MCV RBC AUTO: 96 FL (ref 82–98)
MONOCYTES # BLD AUTO: 0.87 THOUSAND/ΜL (ref 0.17–1.22)
MONOCYTES NFR BLD AUTO: 12 % (ref 4–12)
NEUTROPHILS # BLD AUTO: 3.74 THOUSANDS/ΜL (ref 1.85–7.62)
NEUTS SEG NFR BLD AUTO: 52 % (ref 43–75)
NRBC BLD AUTO-RTO: 0 /100 WBCS
PLATELET # BLD AUTO: 257 THOUSANDS/UL (ref 149–390)
PMV BLD AUTO: 11.4 FL (ref 8.9–12.7)
POTASSIUM SERPL-SCNC: 4.4 MMOL/L (ref 3.5–5.3)
PROT SERPL-MCNC: 7.8 G/DL (ref 6.4–8.2)
PROTHROMBIN TIME: 12.5 SECONDS (ref 11.6–14.5)
RBC # BLD AUTO: 4.99 MILLION/UL (ref 3.88–5.62)
SODIUM SERPL-SCNC: 138 MMOL/L (ref 136–145)
TRIGL SERPL-MCNC: 77 MG/DL
URATE SERPL-MCNC: 5.4 MG/DL (ref 4.2–8)
WBC # BLD AUTO: 7.21 THOUSAND/UL (ref 4.31–10.16)

## 2021-01-29 PROCEDURE — 85025 COMPLETE CBC W/AUTO DIFF WBC: CPT

## 2021-01-29 PROCEDURE — 36415 COLL VENOUS BLD VENIPUNCTURE: CPT

## 2021-01-29 PROCEDURE — 80053 COMPREHEN METABOLIC PANEL: CPT

## 2021-01-29 PROCEDURE — 84550 ASSAY OF BLOOD/URIC ACID: CPT

## 2021-01-29 PROCEDURE — 86618 LYME DISEASE ANTIBODY: CPT

## 2021-01-29 PROCEDURE — 85610 PROTHROMBIN TIME: CPT

## 2021-01-29 PROCEDURE — 93248 EXT ECG>7D<15D REV&INTERPJ: CPT | Performed by: INTERNAL MEDICINE

## 2021-01-29 PROCEDURE — 80061 LIPID PANEL: CPT

## 2021-01-29 PROCEDURE — 99205 OFFICE O/P NEW HI 60 MIN: CPT | Performed by: RADIOLOGY

## 2021-01-29 NOTE — PROGRESS NOTES
Assessment/Plan:     Diagnoses and all orders for this visit:    Cerebral arteriovenous malformation (AVM)  -     CTA head and neck w wo contrast; Future    Carotid stenosis, bilateral  -     Ambulatory referral to Neurosurgery    Vertebral artery occlusion, left        Discussion Summary:   Rob Loya has an asymptomatic AVM  Without access to prior MRI, I am not able to determine if there has been prior bleeding associated with the lesion  There are large venous varices  Henrisadiacoby Cooperan a the age of 79, with a 1-2% risk of rupture per year, I would still recommend treatment  He would first require a diagnostic cerebral angiogram  This would need to be completed after CEA  Additionally, the presence of his AVM does not presents a prohibitive risk for CEA though risk of rupture is mildly elevated once there is reduction of stenosis  There should be very tight BP control sheridan-operative and post-operatively with -140  Regarding his right vertebral artery stenosis, it looks severe on CTA but this could be an overestimation  However I believe this is asymptomatic as he no longer has dizziness and otherwise does not present with VBI type symptoms  This can be evaluated further at time of angiography  Chief Complaint: Consult      Patient ID: Maribel Palm is a 79 y o  male    HPI    Mr  Polina Garcia presents for evaluation of an AVM and vertebral artery stenosis  He was diagnosed with an AVM incidentally a couple of years ago  Managed conserv[facility]  Recently suffering from dizziness  Work revealed   Bilateral carotid stenosis, left vertebral artery occlusion and right vertebral artery origin stenosis  States dizziness has since resolved  He is planned for right CEA  Denies any seizure like activity  Stable AVM appearance since 2019 by MRA  Review of Systems   Constitutional: Negative  HENT: Negative  Eyes: Negative  Respiratory: Negative  Cardiovascular: Negative  Gastrointestinal: Negative  Endocrine: Negative  Genitourinary: Negative  Musculoskeletal: Positive for back pain (for years), gait problem (only with the dizziness) and neck pain (for years)  Negative for myalgias  Allergic/Immunologic: Negative  Neurological: Positive for dizziness (not in a month), light-headedness (sometimes) and numbness (occa in his left pinky and ring finger)  Negative for tremors, seizures, syncope, speech difficulty, weakness and headaches  Hematological: Negative  Psychiatric/Behavioral: Negative  I have personally reviewed the MA's review of systems and made changes as necessary  The following portions of the patient's history were reviewed and updated as appropriate: allergies, current medications, past family history, past medical history, past social history, past surgical history and problem list       Active Ambulatory Problems     Diagnosis Date Noted    Cerebral arteriovenous malformation (AVM) 09/10/2019    Dizziness 11/10/2020    Asymptomatic bilateral carotid artery stenosis 11/13/2020    Atherosclerosis of lower extremity with claudication ([address] ) 11/13/2020    Vertebral artery occlusion, left 11/13/2020    Stage 3a chronic kidney disease 12/22/2020    Palpitations 12/22/2020     Resolved Ambulatory Problems     Diagnosis Date Noted    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 ([address] ) 11/13/2020     Past Medical History:   Diagnosis Date    High blood pressure        Past Surgical History:   Procedure Laterality Date    LITHOTRIPSY               Vitals:    01/29/21 1454   BP: 142/80   Pulse: 64   Resp: 16   Temp: 98 8 °F (37 1 °C)         Objective:    Physical Exam  Constitutional:       Appearance: Normal appearance  HENT:      Head: Normocephalic and atraumatic  Eyes:      Pupils: Pupils are equal, round, and reactive to light  Cardiovascular:      Rate and Rhythm: Normal rate  Pulses: Normal pulses     Pulmonary:      Effort: Pulmonary effort is normal    Musculoskeletal: Normal range of motion  Skin:     General: Skin is warm  Neurological:      General: No focal deficit present  Mental Status: He is alert and oriented to person, place, and time  Cranial Nerves: No cranial nerve deficit  Sensory: No sensory deficit  Motor: No weakness  Psychiatric:         Mood and Affect: Mood normal          Thought Content: Thought content normal          Judgment: Judgment normal        Neurologic Exam     Mental Status   Oriented to person, place, and time  Cranial Nerves     CN III, IV, VI   Pupils are equal, round, and reactive to light  Results/Data:  I have reviewed the results and images from the CTA in detail with the patient

## 2021-01-30 LAB — B BURGDOR IGG+IGM SER-ACNC: 19

## 2021-02-02 ENCOUNTER — TELEPHONE (OUTPATIENT)
Dept: INTERVENTIONAL RADIOLOGY/VASCULAR | Facility: HOSPITAL | Age: 71
End: 2021-02-02

## 2021-02-03 ENCOUNTER — TELEPHONE (OUTPATIENT)
Dept: INTERVENTIONAL RADIOLOGY/VASCULAR | Facility: HOSPITAL | Age: 71
End: 2021-02-03

## 2021-02-03 ENCOUNTER — TELEPHONE (OUTPATIENT)
Dept: SURGERY | Facility: HOSPITAL | Age: 71
End: 2021-02-03

## 2021-02-03 NOTE — PRE-PROCEDURE INSTRUCTIONS
Pt given arrival time of 8am on 2/8 for cardiac cath at the [facility],  NPO after midnight, am meds with a sip of water and pt stated he has a ride

## 2021-02-08 ENCOUNTER — HOSPITAL ENCOUNTER (OUTPATIENT)
Dept: INTERVENTIONAL RADIOLOGY/VASCULAR | Facility: HOSPITAL | Age: 71
Discharge: HOME | End: 2021-02-08
Attending: INTERNAL MEDICINE
Payer: MEDICARE

## 2021-02-08 VITALS
HEART RATE: 60 BPM | DIASTOLIC BLOOD PRESSURE: 80 MMHG | TEMPERATURE: 97.9 F | SYSTOLIC BLOOD PRESSURE: 127 MMHG | HEIGHT: 70 IN | BODY MASS INDEX: 27.93 KG/M2 | RESPIRATION RATE: 16 BRPM | OXYGEN SATURATION: 98 % | WEIGHT: 195.11 LBS

## 2021-02-08 DIAGNOSIS — R94.39 ABNORMAL CARDIOVASCULAR STRESS TEST: ICD-10-CM

## 2021-02-08 LAB
ANION GAP SERPL CALCULATED.3IONS-SCNC: 10 MMOL/L (ref 4–13)
ATRIAL RATE: 58 BPM
BUN SERPL-MCNC: 22 MG/DL (ref 5–25)
CALCIUM SERPL-MCNC: 9.3 MG/DL (ref 8.3–10.1)
CHLORIDE SERPL-SCNC: 103 MMOL/L (ref 100–108)
CO2 SERPL-SCNC: 25 MMOL/L (ref 21–32)
CREAT SERPL-MCNC: 1.66 MG/DL (ref 0.6–1.3)
GFR SERPL CREATININE-BSD FRML MDRD: 41 ML/MIN/1.73SQ M
GLUCOSE P FAST SERPL-MCNC: 122 MG/DL (ref 65–99)
GLUCOSE SERPL-MCNC: 122 MG/DL (ref 65–140)
KCT BLD-ACNC: 201 SEC (ref 89–137)
P AXIS: 56 DEGREES
POTASSIUM SERPL-SCNC: 3.9 MMOL/L (ref 3.5–5.3)
PR INTERVAL: 190 MS
QRS AXIS: 268 DEGREES
QRSD INTERVAL: 94 MS
QT INTERVAL: 420 MS
QTC INTERVAL: 412 MS
SODIUM SERPL-SCNC: 138 MMOL/L (ref 136–145)
SPECIMEN SOURCE: ABNORMAL
T WAVE AXIS: 48 DEGREES
VENTRICULAR RATE: 58 BPM

## 2021-02-08 PROCEDURE — 93010 ELECTROCARDIOGRAM REPORT: CPT | Performed by: INTERNAL MEDICINE

## 2021-02-08 PROCEDURE — C1887 CATHETER, GUIDING: HCPCS | Performed by: INTERNAL MEDICINE

## 2021-02-08 PROCEDURE — 93005 ELECTROCARDIOGRAM TRACING: CPT

## 2021-02-08 PROCEDURE — C1769 GUIDE WIRE: HCPCS | Performed by: INTERNAL MEDICINE

## 2021-02-08 PROCEDURE — 93458 L HRT ARTERY/VENTRICLE ANGIO: CPT | Performed by: INTERNAL MEDICINE

## 2021-02-08 PROCEDURE — 80048 BASIC METABOLIC PNL TOTAL CA: CPT | Performed by: INTERNAL MEDICINE

## 2021-02-08 PROCEDURE — 85347 COAGULATION TIME ACTIVATED: CPT

## 2021-02-08 PROCEDURE — 99152 MOD SED SAME PHYS/QHP 5/>YRS: CPT | Performed by: INTERNAL MEDICINE

## 2021-02-08 PROCEDURE — 93571 IV DOP VEL&/PRESS C FLO 1ST: CPT | Performed by: INTERNAL MEDICINE

## 2021-02-08 PROCEDURE — C1894 INTRO/SHEATH, NON-LASER: HCPCS | Performed by: INTERNAL MEDICINE

## 2021-02-08 PROCEDURE — 99153 MOD SED SAME PHYS/QHP EA: CPT | Performed by: INTERNAL MEDICINE

## 2021-02-08 RX ORDER — HEPARIN SODIUM 1000 [USP'U]/ML
INJECTION, SOLUTION INTRAVENOUS; SUBCUTANEOUS CODE/TRAUMA/SEDATION MEDICATION
Status: COMPLETED | OUTPATIENT
Start: 2021-02-08 | End: 2021-02-08

## 2021-02-08 RX ORDER — ONDANSETRON 2 MG/ML
4 INJECTION INTRAMUSCULAR; INTRAVENOUS EVERY 6 HOURS PRN
Status: DISCONTINUED | OUTPATIENT
Start: 2021-02-08 | End: 2021-02-12 | Stop reason: HOSPADM

## 2021-02-08 RX ORDER — MIDAZOLAM HYDROCHLORIDE 2 MG/2ML
INJECTION, SOLUTION INTRAMUSCULAR; INTRAVENOUS CODE/TRAUMA/SEDATION MEDICATION
Status: COMPLETED | OUTPATIENT
Start: 2021-02-08 | End: 2021-02-08

## 2021-02-08 RX ORDER — LIDOCAINE WITH 8.4% SOD BICARB 0.9%(10ML)
SYRINGE (ML) INJECTION CODE/TRAUMA/SEDATION MEDICATION
Status: COMPLETED | OUTPATIENT
Start: 2021-02-08 | End: 2021-02-08

## 2021-02-08 RX ORDER — SODIUM CHLORIDE 9 MG/ML
100 INJECTION, SOLUTION INTRAVENOUS CONTINUOUS
Status: DISCONTINUED | OUTPATIENT
Start: 2021-02-08 | End: 2021-02-12 | Stop reason: HOSPADM

## 2021-02-08 RX ORDER — VERAPAMIL HCL 2.5 MG/ML
AMPUL (ML) INTRAVENOUS CODE/TRAUMA/SEDATION MEDICATION
Status: COMPLETED | OUTPATIENT
Start: 2021-02-08 | End: 2021-02-08

## 2021-02-08 RX ORDER — NITROGLYCERIN 20 MG/100ML
INJECTION INTRAVENOUS CODE/TRAUMA/SEDATION MEDICATION
Status: COMPLETED | OUTPATIENT
Start: 2021-02-08 | End: 2021-02-08

## 2021-02-08 RX ORDER — FENTANYL CITRATE 50 UG/ML
INJECTION, SOLUTION INTRAMUSCULAR; INTRAVENOUS CODE/TRAUMA/SEDATION MEDICATION
Status: COMPLETED | OUTPATIENT
Start: 2021-02-08 | End: 2021-02-08

## 2021-02-08 RX ADMIN — HEPARIN SODIUM 5000 UNITS: 1000 INJECTION INTRAVENOUS; SUBCUTANEOUS at 10:12

## 2021-02-08 RX ADMIN — MIDAZOLAM HYDROCHLORIDE 1 MG: 1 INJECTION, SOLUTION INTRAMUSCULAR; INTRAVENOUS at 10:01

## 2021-02-08 RX ADMIN — NITROGLYCERIN 200 MCG: 20 INJECTION INTRAVENOUS at 10:09

## 2021-02-08 RX ADMIN — FENTANYL CITRATE 50 MCG: 50 INJECTION, SOLUTION INTRAMUSCULAR; INTRAVENOUS at 10:27

## 2021-02-08 RX ADMIN — HEPARIN SODIUM 3000 UNITS: 1000 INJECTION INTRAVENOUS; SUBCUTANEOUS at 10:27

## 2021-02-08 RX ADMIN — VERAPAMIL HYDROCHLORIDE 2.5 MG: 2.5 INJECTION, SOLUTION INTRAVENOUS at 10:10

## 2021-02-08 RX ADMIN — FENTANYL CITRATE 50 MCG: 50 INJECTION, SOLUTION INTRAMUSCULAR; INTRAVENOUS at 10:01

## 2021-02-08 RX ADMIN — Medication 2 ML: at 10:07

## 2021-02-08 RX ADMIN — IODIXANOL 75 ML: 320 INJECTION, SOLUTION INTRAVASCULAR at 10:39

## 2021-02-08 RX ADMIN — MIDAZOLAM HYDROCHLORIDE 1 MG: 1 INJECTION, SOLUTION INTRAMUSCULAR; INTRAVENOUS at 10:27

## 2021-02-08 RX ADMIN — SODIUM CHLORIDE 100 ML/HR: 0.9 INJECTION, SOLUTION INTRAVENOUS at 11:04

## 2021-02-08 RX ADMIN — SODIUM CHLORIDE 269.4 ML: 0.9 INJECTION, SOLUTION INTRAVENOUS at 08:16

## 2021-02-08 NOTE — PROGRESS NOTES
Patient transported to [facility] on monitor, report given to  RN, care assumed  Assessed R radial puncture site with RN, site is clean, dry and intact with no signs or symptoms of bleeding or hematoma  Cap refill is brisk  Patient denies any chest pain at this time  VSS    TR band with 12ml in pocket

## 2021-02-08 NOTE — DISCHARGE INSTRUCTIONS
After Radial Heart Catheterization   WHAT YOU NEED TO KNOW:   What will happen after a radial heart catheterization? · You will be attached to a heart monitor until you are fully awake  A heart monitor is an EKG that stays on continuously to record your heart's electrical activity  Healthcare providers will monitor your vital signs and pulses in your arm  They will frequently check your pressure bandage for bleeding or swelling  · You may have a band wrapped tightly around your wrist  The band puts pressure on your wound and helps prevent bleeding  A healthcare provider can put air into the band or remove air from the band  A healthcare provider will gradually remove air from the band and decrease pressure on your wrist  The band may be removed in 2 hours or when your wound stops bleeding  · You will need to keep your wrist straight for 2 to 4 hours  Do not  push or pull with your arm  Arm movements can cause serious bleeding  After you are monitored for several hours, you may go home or may need to stay in the hospital overnight  What do I need to know before I go home? · Care for your wound as directed  Remove the pressure bandage in 24 hours or as directed  Mild bruising is normal and expected  A small bandage can be placed on your wound after you remove the pressure bandage  Do not put powders, lotions, or creams on your wound  They may cause your wound to get infected  Monitor your wound every day for signs of infection, such as redness, swelling, or pus  · Shower the day after your procedure or as directed  Remove your pressure bandage before you shower  Do not take baths or go in hot tubs or pools  Carefully wash the wound with soap and water  Pat the area dry  A small bandage can be placed on your wound after you shower  · Apply firm, steady pressure to your wound if it bleeds  Apply pressure with a clean gauze or towel for 5 to 10 minutes   Call 911 if bleeding becomes heavy or does not stop  · Drink liquids as directed  Liquids will help flush the contrast liquid from your body  Ask how much liquid to drink each day and which liquids are best for you  · Do not lift anything heavier than 5 pounds until directed by your healthcare provider  Heavy lifting can put stress on your wound and cause bleeding  Do not push or pull with the arm that was used for the procedure  Do not do vigorous activity for at least 48 hours  Vigorous activity may cause bleeding from your wound  Rest and do quiet activities  Take short walks around the house to prevent a blood clot  Ask your healthcare provider when you can return to your normal activities  · Do not drive or return to work until your healthcare provider says it is okay  Your healthcare provider may tell you to wait 48 hours before you drive to decrease your risk for bleeding  You may not be able to return to work for at least 2 days after your procedure if your job involves heavy lifting  What medicines may I need? You may need any of the following:  · Blood thinners  help prevent blood clots  Clots can cause strokes, heart attacks, and death  The following are general safety guidelines to follow while you are taking a blood thinner:    ? Watch for bleeding and bruising while you take blood thinners  Watch for bleeding from your gums or nose  Watch for blood in your urine and bowel movements  Use a soft washcloth on your skin, and a soft toothbrush to brush your teeth  This can keep your skin and gums from bleeding  If you shave, use an electric shaver  Do not play contact sports  ? Tell your dentist and other healthcare providers that you take a blood thinner  Wear a bracelet or necklace that says you take this medicine  ? Do not start or stop any other medicines unless your healthcare provider tells you to  Many medicines cannot be used with blood thinners      ? Take your blood thinner exactly as prescribed by your healthcare provider  Do not skip does or take less than prescribed  Tell your provider right away if you forget to take your blood thinner, or if you take too much  ? Warfarin  is a blood thinner that you may need to take  The following are things you should be aware of if you take warfarin:     § Foods and medicines can affect the amount of warfarin in your blood  Do not make major changes to your diet while you take warfarin  Warfarin works best when you eat about the same amount of vitamin K every day  Vitamin K is found in green leafy vegetables and certain other foods  Ask for more information about what to eat when you are taking warfarin  § You will need to see your healthcare provider for follow-up visits when you are on warfarin  You will need regular blood tests  These tests are used to decide how much medicine you need  · Acetaminophen  helps decrease pain and fever  This medicine is available without a doctor's order  Ask how much medicine is safe to take, and how often to take it  Acetaminophen can cause liver damage if not taken correctly  · Take your medicine as directed  Contact your healthcare provider if you think your medicine is not helping or if you have side effects  Tell him or her if you are allergic to any medicine  Keep a list of the medicines, vitamins, and herbs you take  Include the amounts, and when and why you take them  Bring the list or the pill bottles to follow-up visits  Carry your medicine list with you in case of an emergency  Call your local emergency number (911 in the [address] [facility]) if:   · You have chest pain  · You have any of the following signs of a heart attack:      ? Squeezing, pressure, or pain in your chest    ? You may  also have any of the following:     ? Discomfort or pain in your back, neck, jaw, stomach, or arm    ? Shortness of breath    ? Nausea or vomiting    ?  Lightheadedness or a sudden cold sweat    · You have any of the following signs of a stroke:      ? Numbness or drooping on one side of your face     ? Weakness in an arm or leg    ? Confusion or difficulty speaking    ? Dizziness, a severe headache, or vision loss    · You cough up blood  · You have trouble breathing  · You cannot stop the bleeding from your wound even after you hold firm pressure for 10 minutes  When should I call my doctor? · You have a fever or chills  · Blood soaks through your bandage  · Your stitches come apart  · Your hand or arm feels numb, cool, or looks pale  · Your wound gets swollen quickly  · Your wound is red, swollen, or draining pus  · Your wound looks more bruised or you have new bruising on the side of your wrist      · You have nausea or are vomiting  · Your skin is itchy, swollen, or you have a rash  · You have questions or concerns about your condition or care  Healthy living tips: The following are general healthy guidelines  If your chest pain is caused by a heart problem, your healthcare provider will give you specific guidelines to follow  · Manage other health conditions  Diabetes and high cholesterol increases your risk for another heart attack and stroke  Talk to your healthcare provider about your management plan  He or she will make a plan that helps you manage your conditions  · Do not smoke  Nicotine and other chemicals in cigarettes and cigars can cause lung and heart damage  Ask your healthcare provider for information if you currently smoke and need help to quit  E-cigarettes or smokeless tobacco still contain nicotine  Talk to your healthcare provider before you use these products  · Eat a variety of healthy, low-fat, low-salt foods  Healthy foods include fruits, vegetables, whole-grain breads, low-fat dairy products, beans, lean meats, and fish  Ask for more information about a heart healthy diet  · Drink plenty of water every day  Your body is made of mostly water   Water helps your body to control your temperature and blood pressure  Ask your healthcare provider how much water you should drink every day  · Ask about activity  Your healthcare provider will tell you which activities to limit or avoid  Ask when you can drive, return to work, and have sex  Ask about the best exercise plan for you  · Maintain a healthy weight  Ask your healthcare provider how much you should weigh  Ask him or her to help you create a weight loss plan if you are overweight  · Get the flu and pneumonia vaccines  All adults should get the influenza (flu) vaccine  Get it every year as soon as it becomes available  The pneumococcal vaccine is given to adults aged 72 years or older  The vaccine is given every 5 years to prevent pneumococcal disease, such as pneumonia  If you have a stent:   · Carry your stent card with you at all times  · Let all healthcare providers know that you have a stent  CARE AGREEMENT:   You have the right to help plan your care  Learn about your health condition and how it may be treated  Discuss treatment options with your healthcare providers to decide what care you want to receive  You always have the right to refuse treatment  The above information is an  only  It is not intended as medical advice for individual conditions or treatments  Talk to your doctor, nurse or pharmacist before following any medical regimen to see if it is safe and effective for you  © Copyright [address] Information is for End User's use only and may not be sold, redistributed or otherwise used for commercial purposes   All illustrations and images included in CareNotes® are the copyrighted property of [employer]  or [address] [place], [place] [address]

## 2021-02-08 NOTE — H&P
Cardiology note:     61-year-old male with hypertension, left bundle-branch block, bilateral carotid artery stenosis, left vertebral artery stenosis, peripheral artery disease, ex-smoker was seen in Cardiology Clinic for dizziness and palpitation  Patient underwent stress test which showed ischemia in LAD territory and is scheduled for cardiac catheterization for definitive evaluation   patient denies any bleeding for AVM which has been diagnosed for 2 years and he takes baby aspirin     physical examination:  Awake, alert and orient x3, not in distress, bilateral entry positive no wheeze or crackle, S1-S2 normal no murmur, abdomen soft nondistended above sounds positive, no leg edema     labs including vitals reviewed   risk( including worsening of renal function), benefit and alternatives of cardiac catheterization including possible coronary intervention explained to the patient    Patient understands and agrees

## 2021-02-09 NOTE — TELEPHONE ENCOUNTER
Laurita David MD  You 1 hour ago (1:02 PM)     Small vessel disease, which will be medically managed  As long as no current chest pain or significant shortness of breath, okay to proceed with planned surgery      Message text

## 2021-02-11 NOTE — TELEPHONE ENCOUNTER
Called neurology office and spoke w/Adeline to see if there were any cancelations or if there was another provider that can see patient  At this time they are full  She advised me she can aggressively put him on the wait list for all 3 providers and call him if something opens up

## 2021-02-23 ENCOUNTER — TELEPHONE (OUTPATIENT)
Dept: CARDIOLOGY CLINIC | Facility: CLINIC | Age: 71
End: 2021-02-23

## 2021-02-23 NOTE — TELEPHONE ENCOUNTER
----- Message from Michelle Gregorio MD sent at 2/1/2021  3:24 PM EST -----  Cholesterol much improved   LDL / bad cholesterol still not at goal  If no significant side effects, then increase Atorvastatin to 80 mg daily

## 2021-02-23 NOTE — TELEPHONE ENCOUNTER
Per Family Doctor  Stopped Atorvastatin  Started Amlodipine besylate 5 mg 1 daily  I explained to the patient that that medication is not for cholesterol       His whole medication list includes:  Amlodipine, lisinopril, baby aspirin

## 2021-03-01 ENCOUNTER — TELEPHONE (OUTPATIENT)
Dept: NEUROLOGY | Facility: CLINIC | Age: 71
End: 2021-03-01

## 2021-03-01 DIAGNOSIS — E78.5 HYPERLIPIDEMIA, UNSPECIFIED HYPERLIPIDEMIA TYPE: Primary | ICD-10-CM

## 2021-03-01 RX ORDER — ATORVASTATIN CALCIUM 80 MG/1
80 TABLET, FILM COATED ORAL DAILY
Qty: 90 TABLET | Refills: 1 | Status: ON HOLD | OUTPATIENT
Start: 2021-03-01 | End: 2021-04-02 | Stop reason: SDUPTHER

## 2021-03-01 NOTE — TELEPHONE ENCOUNTER
Spoke to patient  Patient was confused the last time I talked to him and didn't have the meds he takes directly in front of him  Had him read me off every medication he is on  He said he is taking Atorvastatin 40 mg along with amlodipine and lisinopril and a baby aspirin  I told him to increase his Amlodipine to 80 mg  As directed in the first message you sent me

## 2021-03-01 NOTE — TELEPHONE ENCOUNTER
Try to contact patient but unable to LM to offer a sooner appointment with Dr Evalee Gowers on 3/2/21 at 1:30 pm

## 2021-03-01 NOTE — TELEPHONE ENCOUNTER
Patient stated that he is suppose to go for a Right Carotid Artery surgery  His surgeon is just waiting on a clearance  He did have his cardiac cath done on 02/08/2021  Patients wants to know if he is cleared to have surgery from a cardiac standpoint and if so can you write a clearance letter

## 2021-03-02 NOTE — TELEPHONE ENCOUNTER
Per chart, neuro tried to contact pt to bring him in for OV yesterday but there was no answer and they were not able to leave a vm

## 2021-03-03 NOTE — TELEPHONE ENCOUNTER
Patient called to see where he is at in regards to scheduling surgery  Informed him cardiologist cleared him, however, he still needs to see neurology prior to surgery  Patient was reminded his appt is 3/19  Patient then asks how long the recovery period is for carotid surgery  Informed him approx 3-4 wks  He states he would like to wait to have surgery until after his daughter's wedding on May 30  Did inform patient of risk of stroke while waiting, and he verbalized understanding  He would like Dr Oliveros to comment on if it is OK to wait until after May 30?

## 2021-03-03 NOTE — TELEPHONE ENCOUNTER
Spoke w/Kali this morning  Confirmed his phone number and advised him to clear his voice mail  Neuro tried to call him with a cancellation and they could not get a hold of him  He requests we ask Dr Eddie Ashton if his surgery can wait until after 5/30/21  His daughter will be getting  and wants to wait until after then  I advised him we will let her know and get back to him w/her response  Dr Eddie Ashton, please advise on patient's request to wait for CEA until after May 30th  He also spoke w/nursing today to discuss the risks of waiting

## 2021-03-04 NOTE — TELEPHONE ENCOUNTER
Called patient back and advised him of Dr Evita Walker response to his request  He is still waiting for he neurology appointment  I advised him that I will talk with my manager to see if she can help me with an appointment so he CEA can get done prior to Dr Cornejo's medical leave

## 2021-03-08 ENCOUNTER — CONSULT (OUTPATIENT)
Dept: NEUROLOGY | Facility: CLINIC | Age: 71
End: 2021-03-08
Payer: MEDICARE

## 2021-03-08 ENCOUNTER — TELEPHONE (OUTPATIENT)
Dept: NEUROLOGY | Facility: CLINIC | Age: 71
End: 2021-03-08

## 2021-03-08 VITALS
DIASTOLIC BLOOD PRESSURE: 90 MMHG | HEART RATE: 60 BPM | SYSTOLIC BLOOD PRESSURE: 144 MMHG | HEIGHT: 71 IN | WEIGHT: 197 LBS | BODY MASS INDEX: 27.58 KG/M2

## 2021-03-08 DIAGNOSIS — Q28.2 CEREBRAL ARTERIOVENOUS MALFORMATION (AVM): ICD-10-CM

## 2021-03-08 DIAGNOSIS — I65.02 VERTEBRAL ARTERY OCCLUSION, LEFT: ICD-10-CM

## 2021-03-08 DIAGNOSIS — I65.23 CAROTID STENOSIS, BILATERAL: ICD-10-CM

## 2021-03-08 DIAGNOSIS — I65.21 RIGHT CAROTID ARTERY OCCLUSION: ICD-10-CM

## 2021-03-08 PROCEDURE — 99204 OFFICE O/P NEW MOD 45 MIN: CPT | Performed by: PSYCHIATRY & NEUROLOGY

## 2021-03-08 RX ORDER — AMLODIPINE BESYLATE 5 MG/1
TABLET ORAL DAILY
Status: ON HOLD | COMMUNITY
Start: 2021-02-17 | End: 2021-04-02 | Stop reason: SDUPTHER

## 2021-03-08 RX ORDER — LISINOPRIL 20 MG/1
TABLET ORAL DAILY
Status: ON HOLD | COMMUNITY
Start: 2021-02-17 | End: 2021-04-02 | Stop reason: SDUPTHER

## 2021-03-08 NOTE — TELEPHONE ENCOUNTER
Spoke with patient to offer a sooner appointment with Dr Clint Gotti on 3/8/21 at 3:30 pm, patient accepted this appt

## 2021-03-08 NOTE — PROGRESS NOTES
Tawanda Adam is a 79 y o  male  Chief Complaint   Patient presents with    Vertebral artery occlusion, left    Right carotid artery occlusion    Cerebral arteriovenous malformation (AVM)    Carotid stenosis, bilateral       Assessment:  1  Vertebral artery occlusion, left    2  Right carotid artery occlusion    3  Cerebral arteriovenous malformation (AVM)    4  Carotid stenosis, bilateral        Plan:   continue with aspirin  continue follow-up with Neurosurgery and vascular surgery  keep blood pressure cholesterol and sugar under control  follow-up in 3 months      Discussion:   patient's MRI scan of the brain and CTA of the head and neck results were reviewed,  He has left frontal lobe AV malformation primary extra-axial location along the left frontal calvarium and to the left of the falx, also has severe carotid stenosis on the right with minimal right ICA diameter at 0 9 mm approximately 80% stenosis and has mild less than 50% left ICA stenosis and has left vertebral artery severely stenosed at its origin, he is in follow up with the neurovascular surgeon and with vascular surgery, and is going for carotid endarterectomy, with tight blood pressure control perioperative and postoperative leave with systolic blood pressure  [id number], he also has right vertebral artery stenosis, I think the dizziness could be secondary to that, currently patient does not have dizziness, he is going for angiography with neurovascular surgeon to evaluate that and also for the treatment of the frontal AVM, management as per neurovascular surgery and vascular surgery, he is currently on aspirin he was advised to continue with same, stroke education was given to the patient, he was advised to keep his blood pressure cholesterol and sugar under control, to go to the hospital if has any worsening symptoms or has stroke-like symptoms and call us, to take fall and safety precautions otherwise to see me back in 3-4 months and follow up with the other physicians    Subjective:    HPI      patient is here for evaluation of an AVM and vertebral artery stenosis and dizziness, he describes his dizziness sometimes when he gets up from a sitting position started a few months ago, he has been diagnosed with in AVM a  Few years ago and is being managed conservatively at [facility], he described his dizziness as positional sometimes when he would get up or suddenly moves from side to side but currently does not have any dizziness, no focal weakness, no bowel and bladder incontinence, no falls, no headaches, no confusion, no seizures, he is in follow up with the neurovascular surgeon and with  vascular surgeon and cardiologist  Appetite is good, weight has been stable  Vitals:    03/08/21 1548 03/08/21 1551 03/08/21 1552   BP: 142/78 138/78 144/90   BP Location: Left arm Left arm Left arm   Patient Position: Sitting Standing Lying left side   Cuff Size: Adult Adult Adult   Pulse: 64 64 60   Weight: 89 4 kg (197 lb)     Height: 5' 11" (1 803 m)         Current Medications    Current Outpatient Medications:     amLODIPine (NORVASC) 5 mg tablet, daily, Disp: , Rfl:     aspirin (ECOTRIN LOW STRENGTH) 81 mg EC tablet, Take 81 mg by mouth daily, Disp: , Rfl:     atorvastatin (LIPITOR) 80 mg tablet, Take 1 tablet (80 mg total) by mouth daily Take 2 tabs (80mg) once daily  , Disp: 90 tablet, Rfl: 1    lisinopril (ZESTRIL) 20 mg tablet, daily, Disp: , Rfl:     Azilsartan-Chlorthalidone 40-12 5 MG TABS, Take by mouth, Disp: , Rfl:       Allergies  Butalbital-apap-caffeine    Past Medical History  Past Medical History:   Diagnosis Date    High blood pressure          Past Surgical History:  Past Surgical History:   Procedure Laterality Date    LITHOTRIPSY           Family History:  Family History   Problem Relation Age of Onset    Stroke Mother     Heart disease Mother     Stroke Father     Lung cancer Father        Social History:   reports that he has been smoking cigarettes  He has been smoking about 0 50 packs per day  He has never used smokeless tobacco  He reports current alcohol use  He reports that he does not use drugs  I have reviewed the past medical history, surgical history, social and family history, current medications, allergies vitals, review of systems, and updated this information as appropriate today  Objective:    Physical Exam    Neurological Exam    GENERAL:  Cooperative in no acute distress  Well-developed and well-nourished    HEAD and NECK   Head is atraumatic normocephalic with no lesions or masses  Neck is supple with full range of motion    CARDIOVASCULAR  Carotid Arteries- right-sided carotid bruit    NEUROLOGIC:  Mental Status-the patient is awake alert and oriented without aphasia or apraxia  Cranial Nerves: Visual fields are full to confrontation  Extraocular movements are full without nystagmus  Pupils are 2-1/2 mm and reactive  Face is symmetrical to light touch  Movements of facial expression move symmetrically  Hearing is normal to finger rub bilaterally  Soft palate lifts symmetrically  Shoulder shrug is symmetrical  Tongue is midline without atrophy  Motor: No drift is noted on arm extension  Strength is full in the upper and lower extremities with normal bulk and tone  Sensory: Intact to temperature and vibratory sensation in the upper and lower extremities bilaterally  Cortical function is intact  Coordination: Finger to nose testing is performed accurately  Romberg is negative  Gait reveals a normal base with symmetrical arm swing  Tandem walk is normal   Reflexes:   2+ and symmetrical          ROS:  Review of Systems   Constitutional: Negative for appetite change, fatigue and fever  HENT: Negative for drooling, ear pain, tinnitus, trouble swallowing and voice change  Eyes: Negative for photophobia, pain and visual disturbance  Respiratory: Negative for chest tightness and shortness of breath  Cardiovascular: Negative for chest pain, palpitations and leg swelling  Gastrointestinal: Negative for abdominal pain, constipation, diarrhea, nausea and vomiting  Endocrine: Negative for cold intolerance and heat intolerance  Genitourinary: Negative for difficulty urinating, frequency and urgency  Musculoskeletal: Positive for back pain (Low back), neck pain and neck stiffness  Negative for gait problem, joint swelling and myalgias  Skin: Negative for rash  Neurological: Positive for dizziness  Negative for tremors, seizures, syncope, facial asymmetry, speech difficulty, weakness, light-headedness, numbness and headaches  Psychiatric/Behavioral: Positive for dysphoric mood and sleep disturbance  Negative for agitation, behavioral problems, confusion and decreased concentration  The patient is not nervous/anxious and is not hyperactive

## 2021-03-10 NOTE — TELEPHONE ENCOUNTER
Pt called to schedule his surgery with Dr Jamal Mills Doctor  I advised she went out early on maternity leave and would be out for the next 3 months  I offered him an appointment with another doctor in the office and he could have his surgery with them as well  He accepted  Pt was scheduled with Dr Ian Blanco on 3/17/21 @ 4pm in the [facility] office and he is aware his tentative surgery date is 3/29/21 in [facility] with Dr Ian Blanco

## 2021-03-10 NOTE — TELEPHONE ENCOUNTER
Patient is going on Wednesday the 17th to see the surgeon  I am trying to get him in with you before then  I did explain to the patient that you would like to see him before his surgery  He was confused because he did receive a call that his cath was relatively normal with some blockages but not any that need to have a stent? I explained to him that you would just like to discuss his cath in detail with him

## 2021-03-16 ENCOUNTER — OFFICE VISIT (OUTPATIENT)
Dept: CARDIOLOGY CLINIC | Facility: CLINIC | Age: 71
End: 2021-03-16
Payer: MEDICARE

## 2021-03-16 VITALS
SYSTOLIC BLOOD PRESSURE: 124 MMHG | DIASTOLIC BLOOD PRESSURE: 82 MMHG | BODY MASS INDEX: 27.58 KG/M2 | HEART RATE: 60 BPM | WEIGHT: 197 LBS | HEIGHT: 71 IN

## 2021-03-16 DIAGNOSIS — E78.5 HYPERLIPIDEMIA: ICD-10-CM

## 2021-03-16 DIAGNOSIS — Z01.810 PRE-OPERATIVE CARDIOVASCULAR EXAMINATION: Primary | ICD-10-CM

## 2021-03-16 DIAGNOSIS — F17.200 SMOKES AND MOTIVATED TO QUIT: ICD-10-CM

## 2021-03-16 DIAGNOSIS — R00.2 PALPITATIONS: ICD-10-CM

## 2021-03-16 DIAGNOSIS — I10 ESSENTIAL HYPERTENSION: ICD-10-CM

## 2021-03-16 DIAGNOSIS — I65.29 CAROTID STENOSIS: ICD-10-CM

## 2021-03-16 PROCEDURE — 1124F ACP DISCUSS-NO DSCNMKR DOCD: CPT | Performed by: INTERNAL MEDICINE

## 2021-03-16 PROCEDURE — 99214 OFFICE O/P EST MOD 30 MIN: CPT | Performed by: INTERNAL MEDICINE

## 2021-03-16 RX ORDER — VARENICLINE TARTRATE 25 MG
KIT ORAL
Qty: 53 TABLET | Refills: 0 | Status: SHIPPED | OUTPATIENT
Start: 2021-03-16 | End: 2021-03-23

## 2021-03-16 NOTE — PATIENT INSTRUCTIONS
How to Stop Smoking   WHAT YOU NEED TO KNOW:   You will improve your health and the health of others around you if you stop smoking  Your risk for heart and lung disease, cancer, stroke, heart attack, and vision problems will also decrease  Your adolescent can help prevent or stop harm to his or her brain or body  This will help him or her become a healthy adult  You can benefit from quitting no matter how long you have smoked  DISCHARGE INSTRUCTIONS:   Prepare to stop smoking:  Nicotine is a highly addictive drug found in cigarettes  Withdrawal symptoms can happen when you stop smoking and make it hard to quit  These include anxiety, depression, irritability, trouble sleeping, and increased appetite  You increase your chances of success if you prepare to quit  · Set a quit date  Claraedis Jeanmarie a date that is within the next 2 weeks  Do not pick a day that you think may be stressful or busy  Write down the day or [place] it on your calender  · Tell friends and family that you plan to quit  Explain that you may have withdrawal symptoms when you try to quit  Ask them to support you  They may be able to encourage you and help reduce your stress to make it easier for you to quit  · Make a list of your reasons for quitting  Put the list somewhere you will see it every day, such as your refrigerator  You can look at the list when you have a craving  · Remove all tobacco and nicotine products from your home, car, and workplace  Also, remove anything else that will tempt you to smoke, such as lighters, matches, or ashtrays  Clean your car, home, and places at work that smell like smoke  The smell of smoke can trigger a craving  · Identify triggers that make you want to smoke  This may include activities, feelings, or people  Also write down 1 way you can deal with each of your triggers  For example, if you want to smoke as soon as you wake up, plan another activity during this time, such as exercise      · Make a plan for how you will quit  Learn about the tools that can help you quit, such as medicine, counseling, or nicotine replacement therapy  Choose at least 2 options to help you quit  · Help your adolescent make a plan to quit  The plan will be more successful if your adolescent makes his or her own decisions  Do not try to pressure him or her to quit immediately or in a certain way  Be supportive and offer help if needed  Tools to help you stop smoking:   · Counseling  from a trained healthcare provider can provide you with support and skills to quit smoking  The provider will also teach you to manage your withdrawal symptoms and cravings  You may receive counseling from one counselor, in group therapy, or through phone therapy called a quit line  · Nicotine replacement therapy (NRT)  such as nicotine patches, gum, or lozenges may help reduce your nicotine cravings  You may get these without a doctor's order  Do not use e-cigarettes or smokeless tobacco in place of cigarettes or to help you quit  They still contain nicotine  · Prescription medicines  such as nasal sprays or nicotine inhalers may help reduce your withdrawal symptoms  Other medicines may also be used to reduce your urge to smoke  Ask your healthcare provider about these medicines  You may need to start certain medicines 2 weeks before your quit date for them to work well  · Hypnosis  is a practice that helps guide you through thoughts and feelings  Hypnosis may help decrease your cravings and make you more willing to quit  · Acupuncture therapy  uses very thin needles to balance energy channels in the body  This is thought to help decrease cravings and symptoms of nicotine withdrawal     · Support groups  let you talk to others who are trying to quit or have already quit  It may be helpful to speak with others about how they quit  Manage your cravings:   · Avoid situations, people, and places that tempt you to smoke    Go to nonsmoking places, such as libraries or restaurants  Understand what tempts you and try to avoid these things  · Keep your hands busy  Hold things such as a stress ball or pen  · Put candy or toothpicks in your mouth  Keep lollipops, sugarless gum, or toothpicks with you at all times  · Do not have alcohol or caffeine  These drinks may tempt you to smoke  Drink healthy liquids such as water or juice instead  · Reward yourself when you resist your cravings  Rewards will motivate you and help you stay positive  · Do an activity that distracts you from your craving  Examples include cleaning, creating art, or gardening  Prevent weight gain after you quit:  You may gain a few pounds after you quit smoking  It is healthier for you to gain a few pounds than to continue to smoke  The following can help you prevent weight gain:  · Eat healthy foods  These include fruits, vegetables, whole-grain breads, low-fat dairy products, beans, lean meats, and fish  Eat healthy snacks, such as low-fat yogurt, if you get hungry between meals  · Drink water before, during, and between meals  This will make your stomach feel full and help prevent you from overeating  Ask your healthcare provider how much liquid to drink each day and which liquids are best for you  · Be physically active  Activity may help reduce your cravings and reduce stress  Take a walk or do some kind of physical activity every day  Ask your healthcare provider which activities are right for you  For support and more information:   · Smokefree  gov  Phone: 6- 447 - 439-6775  Web Address: www smokefr[employer]  [address] Information is for End User's use only and may not be sold, redistributed or otherwise used for commercial purposes  All illustrations and images included in CareNotes® are the copyrighted property of A D A M , Inc  or [facility] Harjinder Bradley   The above information is an  only   It is not intended as medical advice for individual conditions or treatments  Talk to your doctor, nurse or pharmacist before following any medical regimen to see if it is safe and effective for you

## 2021-03-16 NOTE — PROGRESS NOTES
[facility] Yaya Bourgeois [facility] [id number] [phone]                                              Cardiology Office Follow up  Rae Richards, 79 y o  male  YOB: 1950  MRN: [id number] Encounter: [id number]      PCP - Daniel Isidro,     Assessment  1  Pre-operative cardiovascular examination  2  Dizziness  3  Left bundle branch block, intermittent  · Possibly rate related, but develops with HR in 60s even  · LHC - 2/8/21 - LAD - large, ectatic, mLAD 50-60% (negative iFR), dLAD 50%, apical LAD 90% - very small, RI 40%, RPLA - 70% stenosis (small-medium, appears amenable to PCI)  · Nuclear stress test - 1/25/21 - LVEF 57%, stress ECG - developed LBBB with discordant ST elevation >5 mm in V3 during lexiscan infusion, had moderate size fixed perfusion defect of intraseptal wall which resolved with prone imaging  Small partially reversible defect of anteroseptal wall  4  Bilateral carotid artery stenosis   5  Left vertebral artery stenosis   6  Lower extremity claudication  7   Current smoker, motivated to quit  · Previously quit, now back to smoking    Plan  Pre-operative cardiovascular evaluation  · Planned procedure: Right Carotid intervention / endarterectomy  · Active cardiac complaints: None  · Cardiac co-morbidities: LBBB, CAD  · Functional status: Active, able to climb up 2 flights of stairs without chest pain/shortness of breath  · Vitals - reviewed and stable  · He had moderate CAD as well as severe small vessel disease on cath, but does not have any symptoms or significant ischemia related to same, and as a result continues to be medically managed for same  · Okay to proceed with planned procedure as moderate cardiac risk for a moderate risk surgery, without any further cardiac testing  · he is medically as optimized as able from the cardiac standpoint    Dizziness, palpitations, LBBB  · Dizziness appears to be multi-factorial, but predominant component appears to be - disequilibrium, possibly related to vertebral artery occlusion  · Zio-patch - 2/2021 - 22 short runs of SVT, upto 12 beats - suggestive of atrial tachycardia  · Symptoms improving overall  · Follows with Neurology/neurosurgery and was not recommended any intervention for vertebral artery  · monitor    CAD, Left bundle branch block  · Has moderate CAD in multiple vessels, and severe stenosis in 2 smaller vessels - apical LAD and RPLA  · No chest pain or shortness of breath  · Continue risk factor optimization with aspirin, atorvastatin 80, amlodipine 10 for now  · Not on BB due to bradycardia/LBBB  · If symptomatic in future, may need to consider intervention for RPLA lesion  · Counseled on smoking cessation    Hyperlipidemia  · Lipid panel 03/07/2019 showed total cholesterol 226, triglycerides 104, HDL 43,    · Lipid panel - 1/29/21 - , TG 77, HDL 38,   · Now on Atorvastatin 80 mg daily  · Continue same    Current smoker  · Previously quit, but now back to smoking  · Strongly emphasized importance of quitting smoking, with all his ongoing CAD, carotid stenosis and vertebral stenosis  · Discussed options to help him quit, and prescribed chantix    Bilateral carotid artery stenosis, left vertebral artery occlusion, lower extremity claudication  · Awaiting vascular intervention for right carotid stenosis        No orders of the defined types were placed in this encounter  No results found for this visit on 03/16/21  Return in about 4 months (around 7/16/2021), or if symptoms worsen or fail to improve  History of Present Illness   79year-old gentleman comes in as a new patient for evaluation of palpitations and dizziness, after being referred by vascular surgery Dr Cornejo Doctor  Over the past few months, patient reports having intermittent episodes of dizziness and "lack of equilibrium", which has been occurring more frequently    He seems to report 2 different types of dizziness, 1 of which happens when he bends forward and gets up or with changes in position  The other form of dizziness seems to happen even at rest   During 1 such episode, while he was trying to get testing completed for MRI/MRA, he was driving to get a test done, and had to stop by the side of the road due to feeling dizzy and having a sense of dysequilibrium  He could not continue driving and had to call EMS who took him came to the ED at [facility] for further evaluation  After brief testing in the ED, he was discharged with outpatient follow-up    He has continued to have intermittent dizziness since then, and followed of with vascular surgery Dr Oliveros In late December, and was referred to see Cardiology and neurology for further evaluation of same    Interval history - 3/16/2021  He comes back for follow-up after completing testing and undergoing cardiac catheterization  His catheterization revealed moderate CAD and small vessel disease, and was recommended continued medical therapy  He continues to be symptom free and does not report any complains of chest pain or shortness of breath  He has started to smoke again        Historical Information   Past Medical History:   Diagnosis Date    Bilateral carotid artery stenosis     Cerebral arteriovenous malformation (AVM)     Coronary artery disease     High blood pressure     Hyperlipidemia     PVD (peripheral vascular disease) with claudication     Vertebral artery occlusion, left      Past Surgical History:   Procedure Laterality Date    CARDIAC CATHETERIZATION  02/08/2021    Mid LAD eccentric 50-60% stenosis, distal LAD 50% stenosis, apical LAD very small caliber vessel with discrete 90% stenosis after it wraps around apex, proximal ramus 40% stenosis, Co-dominant RCA has proximal aneurysm followed by distal 50% stenosis, RPLA is small to medium caliber vessel will discrete 70% stenosis    If patient remains symptomatic even after medication optimization then considera    LITHOTRIPSY       Family History   Problem Relation Age of Onset    Stroke Mother     Heart disease Mother     Stroke Father     Lung cancer Father      Current Outpatient Medications on File Prior to Visit   Medication Sig Dispense Refill    amLODIPine (NORVASC) 5 mg tablet daily      aspirin (ECOTRIN LOW STRENGTH) 81 mg EC tablet Take 81 mg by mouth daily      atorvastatin (LIPITOR) 80 mg tablet Take 1 tablet (80 mg total) by mouth daily Take 2 tabs (80mg) once daily  (Patient taking differently: Take 80 mg by mouth daily ) 90 tablet 1    lisinopril (ZESTRIL) 20 mg tablet daily       No current facility-administered medications on file prior to visit        Allergies   Allergen Reactions    Butalbital-Apap-Caffeine      Hands swell/redness     Social History     Socioeconomic History    Marital status: /Civil Union     Spouse name: None    Number of children: None    Years of education: None    Highest education level: None   Occupational History    None   Social Needs    Financial resource strain: None    Food insecurity     Worry: None     Inability: None    Transportation needs     Medical: None     Non-medical: None   Tobacco Use    Smoking status: Current Every Day Smoker     Packs/day: 0 50     Types: Cigarettes    Smokeless tobacco: Never Used    Tobacco comment: 8 cigs days   Substance and Sexual Activity    Alcohol use: Yes     Frequency: 2-3 times a week     Drinks per session: 3 or 4    Drug use: Never    Sexual activity: None   Lifestyle    Physical activity     Days per week: None     Minutes per session: None    Stress: None   Relationships    Social connections     Talks on phone: None     Gets together: None     Attends [employer] service: None     Active member of club or organization: None     Attends meetings of clubs or organizations: None     Relationship status: None    Intimate partner violence     Fear of current or ex partner: None     Emotionally abused: None     Physically abused: None     Forced sexual activity: None   Other Topics Concern    None   Social History Narrative    None        Review of Systems   Neurological: Positive for dizziness  All other systems reviewed and are negative  Vitals:  Vitals:    03/16/21 1448   BP: 124/82   Pulse: 60   Weight: 89 4 kg (197 lb)   Height: 5' 11" (1 803 m)     BMI - Body mass index is 27 48 kg/m²  Wt Readings from Last 7 Encounters:   03/16/21 89 4 kg (197 lb)   03/08/21 89 4 kg (197 lb)   02/08/21 88 5 kg (195 lb 1 7 oz)   01/29/21 89 8 kg (198 lb)   01/21/21 89 8 kg (198 lb)   01/18/21 90 2 kg (198 lb 13 7 oz)   01/06/21 89 8 kg (198 lb)       Physical Exam  Vitals signs and nursing note reviewed  Constitutional:       General: He is not in acute distress  Appearance: Normal appearance  He is well-developed  He is not ill-appearing or diaphoretic  HENT:      Head: Normocephalic and atraumatic  Nose: No congestion  Eyes:      General: No scleral icterus  Conjunctiva/sclera: Conjunctivae normal    Neck:      Musculoskeletal: Neck supple  No muscular tenderness  Vascular: No carotid bruit or JVD  Cardiovascular:      Rate and Rhythm: Normal rate and regular rhythm  Heart sounds: Normal heart sounds  No murmur  No friction rub  No gallop  Pulmonary:      Effort: Pulmonary effort is normal  No respiratory distress  Breath sounds: Normal breath sounds  No wheezing or rales  Chest:      Chest wall: No tenderness  Abdominal:      General: There is no distension  Palpations: Abdomen is soft  Tenderness: There is no abdominal tenderness  Musculoskeletal:         General: No swelling, tenderness or deformity  Right lower leg: No edema  Left lower leg: No edema  Skin:     General: Skin is warm  Neurological:      Mental Status: He is alert and oriented to person, place, and time   Mental status is at baseline  Psychiatric:         Mood and Affect: Mood normal          Behavior: Behavior normal          Thought Content: Thought content normal        Labs:  CBC:   Lab Results   Component Value Date    WBC 7 21 01/29/2021    RBC 4 99 01/29/2021    HGB 15 5 01/29/2021    HCT 47 7 01/29/2021    MCV 96 01/29/2021     01/29/2021    RDW 12 9 01/29/2021       CMP:   Lab Results   Component Value Date    K 3 9 02/08/2021     02/08/2021    CO2 25 02/08/2021    ANIONGAP 13 04/23/2014    BUN 22 02/08/2021    CREATININE 1 66 (H) 02/08/2021    EGFR 41 02/08/2021    CALCIUM 9 3 02/08/2021    AST 18 01/29/2021    ALT 30 01/29/2021    ALKPHOS 99 01/29/2021       Magnesium:  No results found for: MG    Lipid Profile:   Lab Results   Component Value Date    HDL 38 (L) 01/29/2021    TRIG 77 01/29/2021    LDLCALC 111 (H) 01/29/2021       Thyroid Studies:   Lab Results   Component Value Date    NMI0CAGANQFI 2 390 03/07/2019    C7VYXTN 1 00 03/07/2019    I7QANIX 5 4 03/07/2019       No components found for: "[employer] [place]    Lab Results   Component Value Date    INR 0 93 01/29/2021   5    Imaging: Vas Abdominal Aorta/iliacs; Complete Study    Result Date: 12/10/2020  Narrative:  [facility] REPORT CLINICAL: Indications:  Claudication [I70 219]  Evaluate for progression of Aorto-Iliac occlusive disease  Patient reports bilateral lower extremity pain after walking 1-2 blocks  Risk Factors The patient has history of HTN and previous smoking (quit <1yr ago)  Clinical Right Pressure:  142/ mm Hg, Left Pressure:  136/ mm Hg  FINDINGS:  Unilateral     Impression  PSV (cm/s)  EDV (cm/s)  AP (cm)  Sup-Bertha Ao                         79                  2 1  Px Inf-Farshad Ao                      82                  2 1  Ds Inf-Farshad Ao                      73          84      1 8  Celiac                            149          37           Prox   SMA      >70%               328          34            Right           PSV (cm/s)  EDV (cm/s)  AP (cm)  Prox ALICIA                89                  1 5  Dist ALICIA               112                       Internal Iliac          60                       Prox  EIA              123                  1 0  Dist EIA               216                  1 2  Prox Renal             124          26            Left            Impression  PSV (cm/s)  EDV (cm/s)  AP (cm)  Prox ALICIA        Ectatic             80                  1 6  Dist ALICIA                            78          24      1 3  Internal Iliac                      56                       Prox  EIA                          124                       Dist EIA                           135                  1 3  Prox Renal                         183          53              CONCLUSION:  Impression  The aorta is patent and normal in caliber measuring 2 1 cm in its greatest diameter  The left common iliac artery is ectatic measuring 1 6 cm  The left common iliac artery and bilateral external iliac arteries are patent and normal in caliber  >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  The celiac and bilateral proximal renal arteries are patent  Ankle/Brachial indices are: Rt - 1 08 and Lt - 0 60, severe claudication range  Great toe pressures: Rt - 32 mmHg within the healing range and Lt - flat lined  No prior study for comparison  SIGNATURE: Electronically Signed by: León Bustamante MD on 2020-12-10 11:18:18 AM    Vas Carotid Complete Study    Result Date: 12/10/2020  Narrative:  [facility] REPORT CLINICAL: Indications: Patient presents with a carotid disease multiple cardiovascular risk factors  Recent MRA shows right carotid occlusion  Patient is asymptomatic  Risk Factors The patient has history of HTN and previous smoking (quit <1yr ago)  Clinical Right Pressure:  142/ mm Hg, Left Pressure:  136/ mm Hg    FINDINGS:  Right        Impression  PSV  EDV (cm/s)  Direction of Flow  Ratio  Dist  ICA                 74          38 0 78  Mid  ICA     50 - 69%    135          28                      1 42  Prox  ICA    70 - 99%    401         208                      4 22  Dist CCA                  70          23                            Mid CCA                   95          28                      0 90  Prox CCA                 105          29                            Ext Carotid              131          29                      1 38  Prox Vert                 44          20  Antegrade                 Subclavian               109           0                             Left         Impression  PSV  EDV (cm/s)  Direction of Flow  Ratio  Dist  ICA                 60          24                      0 60  Mid  ICA                  80          37                      0 80  Prox  ICA    1 - 49%     122          51                      1 22  Dist CCA                 104          35                            Mid CCA                  100          33                      0 95  Prox CCA                 106          33                            Ext Carotid               94          29                      0 94  Prox Vert    Resistive    33           0  Antegrade                 Subclavian               119           0                               CONCLUSION:  Impression RIGHT: There is 70-99% stenosis noted in the internal carotid artery  Plaque is heterogenous, calcified and irregular  Vertebral artery flow is antegrade  There is no significant subclavian artery disease  LEFT: There is <50% stenosis noted in the internal carotid artery  Plaque is heterogenous, calcified and irregular  Vertebral artery flow is antegrade and resistive  There is no significant subclavian artery disease  No prior study for comparison  Recommend repeat testing in 6month as per protocol unless otherwise indicated    Internal carotid artery stenosis determination by consensus criteria from: Juma Roth , et al  Carotid Artery Stenosis: Gray-Scale and Doppler US Diagnosis - Society of Radiologists in [address] [place] [address], Radiology 2003; [id number]  SIGNATURE: Electronically Signed by: Nando Lee MD on 2020-12-10 11:20:11 AM    Vas Lower Limb Arterial Duplex, Complete Bilateral    Result Date: 12/10/2020  Narrative:  [facility] REPORT CLINICAL: Indications: Atherosclerosis with Claudication [I70 219]  Patient presents with bilateral lower extremity pain after walking 1-2 blocks  Denies any open wounds or ulcers at this time  Risk Factors The patient has history of HTN and previous smoking (quit <1yr ago)  Clinical Right Pressure:  142/ mm Hg, Left Pressure:  136/ mm Hg  FINDINGS:  Segment                Right            Left                                          PSV (cm/s)  EDV  Impression       PSV (cm/s)  EDV  Common Femoral Artery          95    0                           80    0  Prox Profunda                  77    0                           52       Dist  Profunda                                                         0  Prox SFA                       94    7                           97    0  Mid SFA                        66    0                           73    0  Dist SFA                       67    0  Diffuse Disease          61    0  Proximal Pop                   57    0                           30    0  Distal Pop                     38    0                           37    0  Tibioperoneal                  38                                30       Dist Post Tibial               36    0                           11    0  Prox  Ant  Tibial              55    0                           35    0  Dist  Ant  Tibial                                                25    2  Dist Peroneal                  82    0  Not visualized                       CONCLUSION:  Impression: RIGHT LOWER LIMB: This resting evaluation shows mild diffuse arterial disease without significant focal stenosis   Ankle/Brachial index: 1 08 which is in the normal disease category  PVR/ PPG tracings are slightly dampened  Metatarsal pressure of 60 mmHg Great toe pressure of 32 mmHg, within the healing range  LEFT LOWER LIMB: This resting evaluation shows mild diffuse arterial disease without significant focal stenosis  There is evidence of tibio-peroneal disease with occlusion vs  high grade stenosis of the peroneal artery and monophasic flow in the distal posterior tibial artery  Ankle/Brachial index: 0 60  which is in the severe disease category  PVR/ PPG tracings are dampened  Metatarsal and great toe pressure not audible  SIGNATURE: Electronically Signed by: Ambreen Dickinson MD on 2020-12-10 11:20:50 AM      Cardiac testing:   No results found for this or any previous visit  No results found for this or any previous visit  No results found for this or any previous visit  No results found for this or any previous visit

## 2021-03-17 ENCOUNTER — TELEPHONE (OUTPATIENT)
Dept: ADMINISTRATIVE | Facility: HOSPITAL | Age: 71
End: 2021-03-17

## 2021-03-17 ENCOUNTER — OFFICE VISIT (OUTPATIENT)
Dept: VASCULAR SURGERY | Facility: CLINIC | Age: 71
End: 2021-03-17
Payer: MEDICARE

## 2021-03-17 VITALS
BODY MASS INDEX: 27.3 KG/M2 | HEART RATE: 68 BPM | SYSTOLIC BLOOD PRESSURE: 142 MMHG | WEIGHT: 195 LBS | HEIGHT: 71 IN | DIASTOLIC BLOOD PRESSURE: 92 MMHG | TEMPERATURE: 98.2 F

## 2021-03-17 DIAGNOSIS — I65.23 ASYMPTOMATIC BILATERAL CAROTID ARTERY STENOSIS: Primary | ICD-10-CM

## 2021-03-17 PROCEDURE — 99214 OFFICE O/P EST MOD 30 MIN: CPT | Performed by: SURGERY

## 2021-03-17 RX ORDER — CLOPIDOGREL BISULFATE 75 MG/1
75 TABLET ORAL DAILY
Qty: 90 TABLET | Refills: 1 | Status: SHIPPED | OUTPATIENT
Start: 2021-03-17 | End: 2021-08-25 | Stop reason: ALTCHOICE

## 2021-03-17 RX ORDER — CHLORHEXIDINE GLUCONATE 0.12 MG/ML
15 RINSE ORAL ONCE
Status: CANCELLED | OUTPATIENT
Start: 2021-03-17 | End: 2021-03-17

## 2021-03-17 RX ORDER — CEFAZOLIN SODIUM 2 G/50ML
2000 SOLUTION INTRAVENOUS ONCE
Status: CANCELLED | OUTPATIENT
Start: 2021-03-17 | End: 2021-03-17

## 2021-03-17 NOTE — PROGRESS NOTES
Assessment/Plan:    Asymptomatic bilateral carotid artery stenosis  Reviewed cardiac evaluation note  Moderate risk for carotid endarterectomy  Significant small vessel coronary artery disease and EKG changes during the stress test   We had a detailed discussion of the risks and benefits of endarterectomy versus stenting  Due to the cardiac risk involved here I recommend consideration for carotid stenting  CT angiogram reviewed, there is posterior calcification but otherwise the lesion is amenable for stenting via transcervical approach  Risks of the procedure such as stroke were discussed  Will initiate Plavix  Patient is already on aspirin and high-intensity statin  Operative Scheduling Information:    Hospital:  [facility]    Physician:  Roxanne Lo    Surgery: RIGHT TCAR    Urgency:  Standard    Level:  Level 4: Outpatients to be scheduled for screening procedures and elective surgery that can be delayed for longer than one month without reasonable expectation of detriment to patient  Case Length:  Normal    Post-op Bed:  Stepdown    OR Table:  Discovery    Equipment Needs:  Rep: [address]    Medication Instructions:  Aspirin:   Continue (do not hold)  Plavix:  Continue (do not hold)    Hydration:  No         Diagnoses and all orders for this visit:    Asymptomatic bilateral carotid artery stenosis  -     Case request operating room: ANGIOPLASTY ARTERY CAROTID W/ STENT TCAR Rosario Oliveira; Standing  -     Case request operating room: ANGIOPLASTY ARTERY CAROTID W/ STENT TCAR [facility]  -     clopidogrel (PLAVIX) 75 mg tablet; Take 1 tablet (75 mg total) by mouth daily    Other orders  -     Diet NPO; Sips with meds; Standing  -     Void on call to OR; Standing  -     Insert peripheral IV; Standing  -     Nursing Communication CHG bath, have staff wash entire body (neck down) per pre op bathing protocol   Routine, evening prior to, and day of surgery ; Standing  -     Nursing Communication Swab both nares with Povidone-Iodine solution, EXCLUDE if patient has shellfish/Iodine allergy  Routine, day of surgery, on call to OR ; Standing  -     chlorhexidine (PERIDEX) 0 12 % oral rinse 15 mL  -     Shower/scrub; Standing  -     Place sequential compression device; Standing  -     ceFAZolin (ANCEF) IVPB (premix in dextrose) 2,000 mg 50 mL          Subjective:      Patient ID: Janeth Matthews is a 79 y o  male  Patient would like to proceed with R CEA  Presents today for further discussion  HPI   asymptomatic high-grade right carotid stenosis  Workup was ongoing for carotid endarterectomy, preoperative cardiac workup had a positive stress test and patient underwent cardiac catheterization procedure which demonstrates significant coronary artery disease  He has a moderate risk for carotid artery surgery  No neuro symptoms such as sudden upper or lower ext weakness, numbness, aphasia, dysarthria, imbalance  The following portions of the patient's history were reviewed and updated as appropriate: allergies, current medications, past family history, past medical history, past social history, past surgical history and problem list     Review of Systems   Constitutional: Negative  HENT: Negative  Eyes: Negative  Respiratory: Negative  Cardiovascular: Negative  Gastrointestinal: Negative  Endocrine: Negative  Genitourinary: Negative  Musculoskeletal: Negative  Skin: Negative  Allergic/Immunologic: Negative  Neurological: Negative  Hematological: Negative  Psychiatric/Behavioral: Negative  I have reviewed the review of systems as entered and made appropriate changes as necessary    Objective:      /92 (BP Location: Left arm, Patient Position: Sitting)   Pulse 68   Temp 98 2 °F (36 8 °C) (Tympanic)   Ht 5' 11" (1 803 m)   Wt 88 5 kg (195 lb)   BMI 27 20 kg/m²          Physical Exam  Vitals signs and nursing note reviewed     Constitutional:       Appearance: Normal appearance  Neck:      Musculoskeletal: No neck rigidity  Cardiovascular:      Rate and Rhythm: Normal rate and regular rhythm  Heart sounds: Normal heart sounds  Pulmonary:      Effort: Pulmonary effort is normal       Breath sounds: Normal breath sounds  Lymphadenopathy:      Cervical: No cervical adenopathy  Skin:     General: Skin is warm and dry  Neurological:      General: No focal deficit present     Psychiatric:         Mood and Affect: Mood normal          Behavior: Behavior normal

## 2021-03-17 NOTE — TELEPHONE ENCOUNTER
Check out staff:   Vanna Monsivais: Under Reason For Call, comments MUST be formatted as:   (Surgeon's Initials) / (Procedure)    PHYSICIAN / HOSPITAL: Steven Mitchell (NPI: [id number]) / Lane (Tax: [id number] / NPI: [id number])   BENJAMIN HYBRID    PROCEDURE: RIGHT CAROTID ARTERY STENT    LEVEL: 4    REP: Yes, [address]  ASSISTANT SURGEON: No    ALLERGIES: Butalbital-apap-caffeine    INSTRUCTIONS GIVEN: NO BOWEL PREP    BLOOD THINNERS / MED HOLD: Do not hold Rx - Plavix (Clopidogrel)   DO NOT HOLD ASPIRIN  HYDRATION REQUIRED: Patient does not require hydration  DIALYSIS: Patient is not on dialysis  *Please ROUTE this encounter to [facility] [place] Surgery Coordinator   AND   Send COMPLETE CONSENT to Vascular Surgery Schedulers e-mail group*    I certify that patient has signed, printed, timed, and dated their surgery consent  I certify that BOTH sides of the completed surgery consent have been scanned into the patient's Epic chart by myself on 3/17/2021  *Please ROUTE this encounter to [facility] Clearance Pool   AND   Send CLEARANCE FORM(S) to Vascular Nursing e-mail group*    Patient does not require any pre operative clearance  CARDIAC CLEARANCE OBTAINED

## 2021-03-17 NOTE — ASSESSMENT & PLAN NOTE
Reviewed cardiac evaluation note  Moderate risk for carotid endarterectomy  Significant small vessel coronary artery disease and EKG changes during the stress test   We had a detailed discussion of the risks and benefits of endarterectomy versus stenting  Due to the cardiac risk involved here I recommend consideration for carotid stenting  CT angiogram reviewed, there is posterior calcification but otherwise the lesion is amenable for stenting via transcervical approach  Risks of the procedure such as stroke were discussed  Will initiate Plavix  Patient is already on aspirin and high-intensity statin  Operative Scheduling Information:    H[facility]  [facility]    Physician:  Romelia Obregon    Surgery: RIGHT TCAR    Urgency:  Standard    Level:  Level 4: Outpatients to be scheduled for screening procedures and elective surgery that can be delayed for longer than one month without reasonable expectation of detriment to patient      Case Length:  Normal    Post-op Bed:  Stepdown    OR Table:  Discovery    Equipment Needs:  Rep: [address] [employer] [address]    Medication Instructions:  Aspirin:   Continue (do not hold)  Plavix:  Continue (do not hold)    Hydration:  No

## 2021-03-17 NOTE — H&P (VIEW-ONLY)
Assessment/Plan:    Asymptomatic bilateral carotid artery stenosis  Reviewed cardiac evaluation note  Moderate risk for carotid endarterectomy  Significant small vessel coronary artery disease and EKG changes during the stress test   We had a detailed discussion of the risks and benefits of endarterectomy versus stenting  Due to the cardiac risk involved here I recommend consideration for carotid stenting  CT angiogram reviewed, there is posterior calcification but otherwise the lesion is amenable for stenting via transcervical approach  Risks of the procedure such as stroke were discussed  Will initiate Plavix  Patient is already on aspirin and high-intensity statin  Operative Scheduling Information:    Hospital:  [facility]    Physician:  Mine Kim    Surgery: RIGHT TCAR    Urgency:  Standard    Level:  Level 4: Outpatients to be scheduled for screening procedures and elective surgery that can be delayed for longer than one month without reasonable expectation of detriment to patient  Case Length:  Normal    Post-op Bed:  Stepdown    OR Table:  Discovery    Equipment Needs:  Rep: [address]    Medication Instructions:  Aspirin:   Continue (do not hold)  Plavix:  Continue (do not hold)    Hydration:  No         Diagnoses and all orders for this visit:    Asymptomatic bilateral carotid artery stenosis  -     Case request operating room: ANGIOPLASTY ARTERY CAROTID W/ STENT TCAR [address]; Standing  -     Case request operating room: ANGIOPLASTY ARTERY CAROTID W/ STENT TCAR [address]  -     clopidogrel (PLAVIX) 75 mg tablet; Take 1 tablet (75 mg total) by mouth daily    Other orders  -     Diet NPO; Sips with meds; Standing  -     Void on call to OR; Standing  -     Insert peripheral IV; Standing  -     Nursing Communication CHG bath, have staff wash entire body (neck down) per pre op bathing protocol   Routine, evening prior to, and day of surgery ; Standing  -     Nursing Communication Swab both nares with Povidone-Iodine solution, EXCLUDE if patient has shellfish/Iodine allergy  Routine, day of surgery, on call to OR ; Standing  -     chlorhexidine (PERIDEX) 0 12 % oral rinse 15 mL  -     Shower/scrub; Standing  -     Place sequential compression device; Standing  -     ceFAZolin (ANCEF) IVPB (premix in dextrose) 2,000 mg 50 mL          Subjective:      Patient ID: Davion Masterson is a 79 y o  male  Patient would like to proceed with R CEA  Presents today for further discussion  HPI   asymptomatic high-grade right carotid stenosis  Workup was ongoing for carotid endarterectomy, preoperative cardiac workup had a positive stress test and patient underwent cardiac catheterization procedure which demonstrates significant coronary artery disease  He has a moderate risk for carotid artery surgery  No neuro symptoms such as sudden upper or lower ext weakness, numbness, aphasia, dysarthria, imbalance  The following portions of the patient's history were reviewed and updated as appropriate: allergies, current medications, past family history, past medical history, past social history, past surgical history and problem list     Review of Systems   Constitutional: Negative  HENT: Negative  Eyes: Negative  Respiratory: Negative  Cardiovascular: Negative  Gastrointestinal: Negative  Endocrine: Negative  Genitourinary: Negative  Musculoskeletal: Negative  Skin: Negative  Allergic/Immunologic: Negative  Neurological: Negative  Hematological: Negative  Psychiatric/Behavioral: Negative  I have reviewed the review of systems as entered and made appropriate changes as necessary    Objective:      /92 (BP Location: Left arm, Patient Position: Sitting)   Pulse 68   Temp 98 2 °F (36 8 °C) (Tympanic)   Ht 5' 11" (1 803 m)   Wt 88 5 kg (195 lb)   BMI 27 20 kg/m²          Physical Exam  Vitals signs and nursing note reviewed     Constitutional:       Appearance: Normal appearance  Neck:      Musculoskeletal: No neck rigidity  Cardiovascular:      Rate and Rhythm: Normal rate and regular rhythm  Heart sounds: Normal heart sounds  Pulmonary:      Effort: Pulmonary effort is normal       Breath sounds: Normal breath sounds  Lymphadenopathy:      Cervical: No cervical adenopathy  Skin:     General: Skin is warm and dry  Neurological:      General: No focal deficit present     Psychiatric:         Mood and Affect: Mood normal          Behavior: Behavior normal

## 2021-03-18 ENCOUNTER — PREP FOR PROCEDURE (OUTPATIENT)
Dept: VASCULAR SURGERY | Facility: CLINIC | Age: 71
End: 2021-03-18

## 2021-03-18 DIAGNOSIS — I65.23 ASYMPTOMATIC BILATERAL CAROTID ARTERY STENOSIS: Primary | ICD-10-CM

## 2021-03-18 DIAGNOSIS — Z01.810 PRE-OPERATIVE CARDIOVASCULAR EXAMINATION: ICD-10-CM

## 2021-03-18 NOTE — TELEPHONE ENCOUNTER
Dr Ailyn Austin,    Patient was concerned if he would be getting dye for this surgery and if so he would was wondering about his kidney function and would want hydration  Please advise if he will need hydration?     Thanks  Johanne Brittle

## 2021-03-18 NOTE — TELEPHONE ENCOUNTER
Is patient requesting a call when authorization has been obtained? Patient did not request a call  Surgery Date: 4-1-21  Primary Surgeon: Tino Sands (NPI: [id number])  Assisting Surgeon: Not Applicable (N/A)  Facility: [facility] (Tax: [id number] / NPI: [id number])  Inpatient / Outpatient: Inpatient  Level: 4    Clearance Received: Yes, Cardiology   Consent Received: Yes, scanned into Epic on 3-17-21  Medication Hold / Last Dose: No  VQI Spreadsheet: Yes  IR Notified: Yes  Rep  Notified: Yes  Equipment Needs: Not Applicable (N/A)  Vas Lab Requested: Not Applicable (N/A)  Patient Contacted: 3-18-21    Diagnosis: I65 23  Procedure/ CPT Code(s): (TCAR)  Transcarotid Artery Revascularization // CPT: 30243 Right TCAR    For varicose vein related procedures, last LEVDR? Not Applicable (N/A)    Post Operative Date/ Time: 4-20-21 , [address] [facility]) with STANLEY Kelsey (NPI: [id number])      *Please review with patient med hold, PATs, and check H&P *  PATIENT WAS MAILED SURGERY/SHOWERING/DISCHARGE/COVID INSTRUCTIONS AFTER REVIEWING WITH HER VIA PHONE CALL  Spoke to patient and patient will go for blood work before the surgery   [facility]

## 2021-03-23 ENCOUNTER — ANESTHESIA EVENT (OUTPATIENT)
Dept: PERIOP | Facility: HOSPITAL | Age: 71
DRG: 034 | End: 2021-03-23
Payer: MEDICARE

## 2021-03-23 NOTE — PRE-PROCEDURE INSTRUCTIONS
Pre-Surgery Instructions:   Medication Instructions    amLODIPine (NORVASC) 5 mg tablet Pt taking DOS    aspirin (ECOTRIN LOW STRENGTH) 81 mg EC tablet Pt taking DOS    atorvastatin (LIPITOR) 80 mg tablet Pt taking DOS    clopidogrel (PLAVIX) 75 mg tablet Pt taking DOS    lisinopril (ZESTRIL) 20 mg tablet Pt not taking day before or DOS    ACE/ARB Med Class - FOR AL  Do not take this medication the day before and the morning of the day of surgery/procedure  ASA Med Class: Aspirin  Should be discontinued at least one week prior to planned operation, unless specifically stated otherwise by surgical service  Your Surgeon may have patient stop taking aspirin up to a week before surgery if having intracranial, middle ear, posterior eye, spine surgery or prostate surgery  [Patients taking aspirin for coronary stents should be reviewed by an anesthesiologist in the optimization clinic  Please do not discontinue aspirin in patients with coronary stents unless given specific permission to do so by the cardiologist who prescribed medication ]   If your surgeon approves please continue to take this medication on your normal schedule  You may take this medication on the morning of your surgery with a sip of water  Calcium Channel Blocker Med Class  Continue to take this heart medication on your normal schedule  If this is an oral medication and you take it in the morning, then you may take this medicine with a sip of water  Platelet Aggregation Inhibitor Clopidogrel (Plavix) Med Class  Should be discontinued at least one week prior to planned operation, unless specifically stated otherwise by surgical service  [Patients taking Plavix for coronary stents should be reviewed by an anesthesiologist in the optimization clinic    Please do not discontinue Plavix in patients with coronary stents unless given specific permission to do so by the cardiologist who prescribed medication ] If your surgeon approves please continue to take this medication on your normal schedule  You may take this medication on the morning of your surgery with a sip of water  Statin Med Class  Continue to take this medication on your normal schedule  If this is an oral medication and you take it in the morning, then you may take this medicine with a sip of water  Reviewed with Pt showering insts from surgeons office  Reviewed medication insts from surgeons office Pt continuing ASA & PLAVIX for DOS NO HOLDS  HOLDING ACE / ARB day before & DOS    NPO at [facility] 3/31 for DOS 4/1 including candy etc   Advised of candy etc   Pt verbalized understanding to all of the above  and is going for labs on 3/24/21

## 2021-03-24 ENCOUNTER — LAB REQUISITION (OUTPATIENT)
Dept: LAB | Facility: HOSPITAL | Age: 71
End: 2021-03-24
Payer: MEDICARE

## 2021-03-24 ENCOUNTER — PREP FOR PROCEDURE (OUTPATIENT)
Dept: VASCULAR SURGERY | Facility: CLINIC | Age: 71
End: 2021-03-24

## 2021-03-24 ENCOUNTER — TRANSCRIBE ORDERS (OUTPATIENT)
Dept: ADMINISTRATIVE | Facility: HOSPITAL | Age: 71
End: 2021-03-24

## 2021-03-24 ENCOUNTER — APPOINTMENT (OUTPATIENT)
Dept: LAB | Facility: CLINIC | Age: 71
End: 2021-03-24
Payer: MEDICARE

## 2021-03-24 DIAGNOSIS — I65.23 ASYMPTOMATIC BILATERAL CAROTID ARTERY STENOSIS: ICD-10-CM

## 2021-03-24 DIAGNOSIS — R94.4 NONSPECIFIC ABNORMAL RESULTS OF KIDNEY FUNCTION STUDY: ICD-10-CM

## 2021-03-24 DIAGNOSIS — N41.9 PROSTATITIS, UNSPECIFIED PROSTATITIS TYPE: ICD-10-CM

## 2021-03-24 DIAGNOSIS — R79.9 ABNORMAL BLOOD CHEMISTRY: Primary | ICD-10-CM

## 2021-03-24 DIAGNOSIS — I65.23 OCCLUSION AND STENOSIS OF BILATERAL CAROTID ARTERIES: ICD-10-CM

## 2021-03-24 DIAGNOSIS — R79.9 ABNORMAL BLOOD CHEMISTRY: ICD-10-CM

## 2021-03-24 DIAGNOSIS — Z12.5 SPECIAL SCREENING FOR MALIGNANT NEOPLASM OF PROSTATE: ICD-10-CM

## 2021-03-24 LAB
ALBUMIN SERPL BCP-MCNC: 3.9 G/DL (ref 3.5–5)
ALP SERPL-CCNC: 111 U/L (ref 46–116)
ALT SERPL W P-5'-P-CCNC: 29 U/L (ref 12–78)
ANION GAP SERPL CALCULATED.3IONS-SCNC: 4 MMOL/L (ref 4–13)
AST SERPL W P-5'-P-CCNC: 18 U/L (ref 5–45)
BILIRUB SERPL-MCNC: 0.67 MG/DL (ref 0.2–1)
BUN SERPL-MCNC: 18 MG/DL (ref 5–25)
CALCIUM SERPL-MCNC: 9.2 MG/DL (ref 8.3–10.1)
CHLORIDE SERPL-SCNC: 108 MMOL/L (ref 100–108)
CHOLEST SERPL-MCNC: 111 MG/DL (ref 50–200)
CO2 SERPL-SCNC: 26 MMOL/L (ref 21–32)
CREAT SERPL-MCNC: 1.45 MG/DL (ref 0.6–1.3)
ERYTHROCYTE [DISTWIDTH] IN BLOOD BY AUTOMATED COUNT: 12.2 % (ref 11.6–15.1)
GFR SERPL CREATININE-BSD FRML MDRD: 48 ML/MIN/1.73SQ M
GLUCOSE P FAST SERPL-MCNC: 99 MG/DL (ref 65–99)
HCT VFR BLD AUTO: 49.2 % (ref 36.5–49.3)
HGB BLD-MCNC: 16.2 G/DL (ref 12–17)
INR PPP: 1.04 (ref 0.84–1.19)
MCH RBC QN AUTO: 31.8 PG (ref 26.8–34.3)
MCHC RBC AUTO-ENTMCNC: 32.9 G/DL (ref 31.4–37.4)
MCV RBC AUTO: 97 FL (ref 82–98)
PLATELET # BLD AUTO: 253 THOUSANDS/UL (ref 149–390)
PMV BLD AUTO: 11 FL (ref 8.9–12.7)
POTASSIUM SERPL-SCNC: 4.3 MMOL/L (ref 3.5–5.3)
PROT SERPL-MCNC: 7.5 G/DL (ref 6.4–8.2)
PROTHROMBIN TIME: 13.6 SECONDS (ref 11.6–14.5)
PSA SERPL-MCNC: 1.8 NG/ML (ref 0–4)
RBC # BLD AUTO: 5.1 MILLION/UL (ref 3.88–5.62)
SODIUM SERPL-SCNC: 138 MMOL/L (ref 136–145)
TRIGL SERPL-MCNC: 66 MG/DL
URATE SERPL-MCNC: 4.3 MG/DL (ref 4.2–8)
WBC # BLD AUTO: 7.41 THOUSAND/UL (ref 4.31–10.16)

## 2021-03-24 PROCEDURE — 84478 ASSAY OF TRIGLYCERIDES: CPT

## 2021-03-24 PROCEDURE — 82465 ASSAY BLD/SERUM CHOLESTEROL: CPT

## 2021-03-24 PROCEDURE — G0103 PSA SCREENING: HCPCS

## 2021-03-24 PROCEDURE — 80053 COMPREHEN METABOLIC PANEL: CPT

## 2021-03-24 PROCEDURE — 36415 COLL VENOUS BLD VENIPUNCTURE: CPT

## 2021-03-24 PROCEDURE — 84550 ASSAY OF BLOOD/URIC ACID: CPT

## 2021-03-24 PROCEDURE — 85610 PROTHROMBIN TIME: CPT

## 2021-03-24 PROCEDURE — 86900 BLOOD TYPING SEROLOGIC ABO: CPT | Performed by: SURGERY

## 2021-03-24 PROCEDURE — 85027 COMPLETE CBC AUTOMATED: CPT

## 2021-03-24 PROCEDURE — 86901 BLOOD TYPING SEROLOGIC RH(D): CPT | Performed by: SURGERY

## 2021-03-24 PROCEDURE — 86850 RBC ANTIBODY SCREEN: CPT | Performed by: SURGERY

## 2021-03-25 ENCOUNTER — TELEPHONE (OUTPATIENT)
Dept: VASCULAR SURGERY | Facility: CLINIC | Age: 71
End: 2021-03-25

## 2021-03-25 LAB
ABO GROUP BLD: NORMAL
BLD GP AB SCN SERPL QL: NEGATIVE
RH BLD: POSITIVE
SPECIMEN EXPIRATION DATE: NORMAL

## 2021-03-25 NOTE — TELEPHONE ENCOUNTER
Dr Solano Record I received a phone call from patient asking me to advise you that he forgot to tell you that he has a large hernia on the left side and was unsure if this would affect his upcoming surgery R TCAR on 4-1-21      Please advise     Thanks   Agueda Pelaez

## 2021-03-31 PROBLEM — I25.10 CAD (CORONARY ARTERY DISEASE): Status: ACTIVE | Noted: 2021-03-31

## 2021-04-01 ENCOUNTER — APPOINTMENT (OUTPATIENT)
Dept: RADIOLOGY | Facility: HOSPITAL | Age: 71
DRG: 034 | End: 2021-04-01
Payer: MEDICARE

## 2021-04-01 ENCOUNTER — ANESTHESIA (OUTPATIENT)
Dept: PERIOP | Facility: HOSPITAL | Age: 71
DRG: 034 | End: 2021-04-01
Payer: MEDICARE

## 2021-04-01 ENCOUNTER — HOSPITAL ENCOUNTER (INPATIENT)
Facility: HOSPITAL | Age: 71
LOS: 1 days | Discharge: HOME | DRG: 034 | End: 2021-04-02
Attending: SURGERY | Admitting: SURGERY
Payer: MEDICARE

## 2021-04-01 DIAGNOSIS — Z91.89 AT RISK FOR INFECTION: ICD-10-CM

## 2021-04-01 DIAGNOSIS — I65.23 ASYMPTOMATIC BILATERAL CAROTID ARTERY STENOSIS: Primary | ICD-10-CM

## 2021-04-01 DIAGNOSIS — E78.5 HYPERLIPIDEMIA, UNSPECIFIED HYPERLIPIDEMIA TYPE: ICD-10-CM

## 2021-04-01 DIAGNOSIS — Z01.818 PRE-OP TESTING: ICD-10-CM

## 2021-04-01 DIAGNOSIS — N18.31 STAGE 3A CHRONIC KIDNEY DISEASE (HCC): ICD-10-CM

## 2021-04-01 LAB
ABO GROUP BLD: NORMAL
GLUCOSE SERPL-MCNC: 96 MG/DL (ref 65–140)
KCT BLD-ACNC: 115 SEC (ref 89–137)
KCT BLD-ACNC: 243 SEC (ref 89–137)
RH BLD: POSITIVE
SPECIMEN SOURCE: ABNORMAL
SPECIMEN SOURCE: NORMAL

## 2021-04-01 PROCEDURE — X2AH336 CEREBRAL EMBOLIC FILTRATION, EXTRACORPOREAL FLOW REVERSAL CIRCUIT FROM RIGHT COMMON CAROTID ARTERY, PERCUTANEOUS APPROACH, NEW TECHNOLOGY GROUP 6: ICD-10-PCS | Performed by: SURGERY

## 2021-04-01 PROCEDURE — C1876 STENT, NON-COA/NON-COV W/DEL: HCPCS | Performed by: SURGERY

## 2021-04-01 PROCEDURE — C1894 INTRO/SHEATH, NON-LASER: HCPCS | Performed by: SURGERY

## 2021-04-01 PROCEDURE — C1725 CATH, TRANSLUMIN NON-LASER: HCPCS | Performed by: SURGERY

## 2021-04-01 PROCEDURE — 82948 REAGENT STRIP/BLOOD GLUCOSE: CPT

## 2021-04-01 PROCEDURE — C1769 GUIDE WIRE: HCPCS | Performed by: SURGERY

## 2021-04-01 PROCEDURE — 99222 1ST HOSP IP/OBS MODERATE 55: CPT | Performed by: INTERNAL MEDICINE

## 2021-04-01 PROCEDURE — 037K3DZ DILATION OF RIGHT INTERNAL CAROTID ARTERY WITH INTRALUMINAL DEVICE, PERCUTANEOUS APPROACH: ICD-10-PCS | Performed by: SURGERY

## 2021-04-01 PROCEDURE — 85347 COAGULATION TIME ACTIVATED: CPT

## 2021-04-01 PROCEDURE — NC001 PR NO CHARGE: Performed by: SURGERY

## 2021-04-01 PROCEDURE — 37215 TRANSCATH STENT CCA W/EPS: CPT | Performed by: SURGERY

## 2021-04-01 PROCEDURE — 76937 US GUIDE VASCULAR ACCESS: CPT

## 2021-04-01 DEVICE — 9 MM X 30 MM
Type: IMPLANTABLE DEVICE | Site: CAROTID | Status: FUNCTIONAL
Brand: ENROUTE TRANSCAROTID STENT

## 2021-04-01 RX ORDER — CHLORHEXIDINE GLUCONATE 0.12 MG/ML
15 RINSE ORAL ONCE
Status: COMPLETED | OUTPATIENT
Start: 2021-04-01 | End: 2021-04-01

## 2021-04-01 RX ORDER — CEFAZOLIN SODIUM 2 G/50ML
SOLUTION INTRAVENOUS AS NEEDED
Status: DISCONTINUED | OUTPATIENT
Start: 2021-04-01 | End: 2021-04-01

## 2021-04-01 RX ORDER — LANOLIN ALCOHOL/MO/W.PET/CERES
3 CREAM (GRAM) TOPICAL
Status: DISCONTINUED | OUTPATIENT
Start: 2021-04-01 | End: 2021-04-02 | Stop reason: HOSPADM

## 2021-04-01 RX ORDER — SODIUM CHLORIDE 9 MG/ML
75 INJECTION, SOLUTION INTRAVENOUS CONTINUOUS
Status: DISCONTINUED | OUTPATIENT
Start: 2021-04-01 | End: 2021-04-01

## 2021-04-01 RX ORDER — CLOPIDOGREL BISULFATE 75 MG/1
75 TABLET ORAL DAILY
Status: DISCONTINUED | OUTPATIENT
Start: 2021-04-02 | End: 2021-04-02 | Stop reason: HOSPADM

## 2021-04-01 RX ORDER — DEXMEDETOMIDINE HYDROCHLORIDE 100 UG/ML
INJECTION, SOLUTION INTRAVENOUS AS NEEDED
Status: DISCONTINUED | OUTPATIENT
Start: 2021-04-01 | End: 2021-04-01

## 2021-04-01 RX ORDER — FENTANYL CITRATE/PF 50 MCG/ML
25 SYRINGE (ML) INJECTION
Status: COMPLETED | OUTPATIENT
Start: 2021-04-01 | End: 2021-04-01

## 2021-04-01 RX ORDER — LIDOCAINE HYDROCHLORIDE 10 MG/ML
0.5 INJECTION, SOLUTION EPIDURAL; INFILTRATION; INTRACAUDAL; PERINEURAL ONCE AS NEEDED
Status: COMPLETED | OUTPATIENT
Start: 2021-04-01 | End: 2021-04-01

## 2021-04-01 RX ORDER — PROPOFOL 10 MG/ML
INJECTION, EMULSION INTRAVENOUS AS NEEDED
Status: DISCONTINUED | OUTPATIENT
Start: 2021-04-01 | End: 2021-04-01

## 2021-04-01 RX ORDER — HYDROMORPHONE HCL IN WATER/PF 6 MG/30 ML
0.2 PATIENT CONTROLLED ANALGESIA SYRINGE INTRAVENOUS
Status: DISCONTINUED | OUTPATIENT
Start: 2021-04-01 | End: 2021-04-01 | Stop reason: HOSPADM

## 2021-04-01 RX ORDER — FENTANYL CITRATE 50 UG/ML
INJECTION, SOLUTION INTRAMUSCULAR; INTRAVENOUS AS NEEDED
Status: DISCONTINUED | OUTPATIENT
Start: 2021-04-01 | End: 2021-04-01

## 2021-04-01 RX ORDER — AMLODIPINE BESYLATE 5 MG/1
5 TABLET ORAL DAILY
Status: DISCONTINUED | OUTPATIENT
Start: 2021-04-02 | End: 2021-04-01

## 2021-04-01 RX ORDER — ONDANSETRON 2 MG/ML
INJECTION INTRAMUSCULAR; INTRAVENOUS AS NEEDED
Status: DISCONTINUED | OUTPATIENT
Start: 2021-04-01 | End: 2021-04-01

## 2021-04-01 RX ORDER — ASPIRIN 81 MG/1
81 TABLET ORAL DAILY
Status: DISCONTINUED | OUTPATIENT
Start: 2021-04-02 | End: 2021-04-02 | Stop reason: HOSPADM

## 2021-04-01 RX ORDER — GLYCOPYRROLATE 0.2 MG/ML
INJECTION INTRAMUSCULAR; INTRAVENOUS AS NEEDED
Status: DISCONTINUED | OUTPATIENT
Start: 2021-04-01 | End: 2021-04-01

## 2021-04-01 RX ORDER — DOCUSATE SODIUM 100 MG/1
100 CAPSULE, LIQUID FILLED ORAL 2 TIMES DAILY
Status: DISCONTINUED | OUTPATIENT
Start: 2021-04-01 | End: 2021-04-02 | Stop reason: HOSPADM

## 2021-04-01 RX ORDER — LABETALOL 20 MG/4 ML (5 MG/ML) INTRAVENOUS SYRINGE
5
Status: DISCONTINUED | OUTPATIENT
Start: 2021-04-01 | End: 2021-04-02

## 2021-04-01 RX ORDER — SODIUM CHLORIDE, SODIUM LACTATE, POTASSIUM CHLORIDE, CALCIUM CHLORIDE 600; 310; 30; 20 MG/100ML; MG/100ML; MG/100ML; MG/100ML
50 INJECTION, SOLUTION INTRAVENOUS CONTINUOUS
Status: DISPENSED | OUTPATIENT
Start: 2021-04-01 | End: 2021-04-01

## 2021-04-01 RX ORDER — AMLODIPINE BESYLATE 5 MG/1
5 TABLET ORAL DAILY
Status: DISCONTINUED | OUTPATIENT
Start: 2021-04-02 | End: 2021-04-02 | Stop reason: HOSPADM

## 2021-04-01 RX ORDER — ATORVASTATIN CALCIUM 80 MG/1
80 TABLET, FILM COATED ORAL
Status: DISCONTINUED | OUTPATIENT
Start: 2021-04-01 | End: 2021-04-02 | Stop reason: HOSPADM

## 2021-04-01 RX ORDER — ONDANSETRON 2 MG/ML
4 INJECTION INTRAMUSCULAR; INTRAVENOUS ONCE AS NEEDED
Status: DISCONTINUED | OUTPATIENT
Start: 2021-04-01 | End: 2021-04-01 | Stop reason: HOSPADM

## 2021-04-01 RX ORDER — HEPARIN SODIUM 1000 [USP'U]/ML
INJECTION, SOLUTION INTRAVENOUS; SUBCUTANEOUS AS NEEDED
Status: DISCONTINUED | OUTPATIENT
Start: 2021-04-01 | End: 2021-04-01

## 2021-04-01 RX ORDER — ONDANSETRON 2 MG/ML
4 INJECTION INTRAMUSCULAR; INTRAVENOUS EVERY 6 HOURS PRN
Status: DISCONTINUED | OUTPATIENT
Start: 2021-04-01 | End: 2021-04-02 | Stop reason: HOSPADM

## 2021-04-01 RX ORDER — EPHEDRINE SULFATE 50 MG/ML
INJECTION INTRAVENOUS AS NEEDED
Status: DISCONTINUED | OUTPATIENT
Start: 2021-04-01 | End: 2021-04-01

## 2021-04-01 RX ORDER — HYDRALAZINE HYDROCHLORIDE 20 MG/ML
15 INJECTION INTRAMUSCULAR; INTRAVENOUS
Status: DISCONTINUED | OUTPATIENT
Start: 2021-04-01 | End: 2021-04-02

## 2021-04-01 RX ORDER — SODIUM CHLORIDE 9 MG/ML
INJECTION, SOLUTION INTRAVENOUS CONTINUOUS PRN
Status: DISCONTINUED | OUTPATIENT
Start: 2021-04-01 | End: 2021-04-01

## 2021-04-01 RX ORDER — CEFAZOLIN SODIUM 2 G/50ML
2000 SOLUTION INTRAVENOUS ONCE
Status: DISCONTINUED | OUTPATIENT
Start: 2021-04-01 | End: 2021-04-01 | Stop reason: HOSPADM

## 2021-04-01 RX ORDER — SODIUM CHLORIDE, SODIUM LACTATE, POTASSIUM CHLORIDE, CALCIUM CHLORIDE 600; 310; 30; 20 MG/100ML; MG/100ML; MG/100ML; MG/100ML
125 INJECTION, SOLUTION INTRAVENOUS CONTINUOUS
Status: DISCONTINUED | OUTPATIENT
Start: 2021-04-01 | End: 2021-04-01

## 2021-04-01 RX ORDER — HEPARIN SODIUM 5000 [USP'U]/ML
5000 INJECTION, SOLUTION INTRAVENOUS; SUBCUTANEOUS EVERY 8 HOURS SCHEDULED
Status: DISCONTINUED | OUTPATIENT
Start: 2021-04-01 | End: 2021-04-02 | Stop reason: HOSPADM

## 2021-04-01 RX ORDER — OXYCODONE HYDROCHLORIDE 5 MG/1
5 TABLET ORAL EVERY 4 HOURS PRN
Status: DISCONTINUED | OUTPATIENT
Start: 2021-04-01 | End: 2021-04-02 | Stop reason: HOSPADM

## 2021-04-01 RX ORDER — HEPARIN SODIUM 200 [USP'U]/100ML
INJECTION, SOLUTION INTRAVENOUS
Status: COMPLETED | OUTPATIENT
Start: 2021-04-01 | End: 2021-04-01

## 2021-04-01 RX ORDER — OXYCODONE HYDROCHLORIDE 5 MG/1
10 TABLET ORAL EVERY 4 HOURS PRN
Status: DISCONTINUED | OUTPATIENT
Start: 2021-04-01 | End: 2021-04-02 | Stop reason: HOSPADM

## 2021-04-01 RX ORDER — PROTAMINE SULFATE 10 MG/ML
INJECTION, SOLUTION INTRAVENOUS AS NEEDED
Status: DISCONTINUED | OUTPATIENT
Start: 2021-04-01 | End: 2021-04-01

## 2021-04-01 RX ORDER — ONDANSETRON 2 MG/ML
4 INJECTION INTRAMUSCULAR; INTRAVENOUS ONCE AS NEEDED
Status: DISCONTINUED | OUTPATIENT
Start: 2021-04-01 | End: 2021-04-01

## 2021-04-01 RX ADMIN — DEXMEDETOMIDINE HCL 4 MCG: 100 INJECTION INTRAVENOUS at 08:08

## 2021-04-01 RX ADMIN — EPHEDRINE SULFATE 10 MG: 50 INJECTION, SOLUTION INTRAVENOUS at 09:34

## 2021-04-01 RX ADMIN — SODIUM CHLORIDE 260 ML: 0.9 INJECTION, SOLUTION INTRAVENOUS at 07:05

## 2021-04-01 RX ADMIN — PROPOFOL 40 MG: 10 INJECTION, EMULSION INTRAVENOUS at 09:49

## 2021-04-01 RX ADMIN — DEXMEDETOMIDINE HCL 4 MCG: 100 INJECTION INTRAVENOUS at 09:28

## 2021-04-01 RX ADMIN — Medication 25 MCG: at 10:40

## 2021-04-01 RX ADMIN — DEXMEDETOMIDINE HCL 4 MCG: 100 INJECTION INTRAVENOUS at 09:15

## 2021-04-01 RX ADMIN — OXYCODONE HYDROCHLORIDE 10 MG: 10 TABLET ORAL at 22:57

## 2021-04-01 RX ADMIN — PROTAMINE SULFATE 40 MG: 10 INJECTION, SOLUTION INTRAVENOUS at 09:47

## 2021-04-01 RX ADMIN — MELATONIN TAB 3 MG 3 MG: 3 TAB at 22:57

## 2021-04-01 RX ADMIN — NICARDIPINE HYDROCHLORIDE 2 MG/HR: 2.5 INJECTION, SOLUTION INTRAVENOUS at 09:13

## 2021-04-01 RX ADMIN — Medication 25 MCG: at 11:19

## 2021-04-01 RX ADMIN — DEXMEDETOMIDINE HCL 4 MCG: 100 INJECTION INTRAVENOUS at 07:52

## 2021-04-01 RX ADMIN — FENTANYL CITRATE 25 MCG: 50 INJECTION INTRAMUSCULAR; INTRAVENOUS at 08:35

## 2021-04-01 RX ADMIN — FENTANYL CITRATE 25 MCG: 50 INJECTION INTRAMUSCULAR; INTRAVENOUS at 08:54

## 2021-04-01 RX ADMIN — HYDROMORPHONE HYDROCHLORIDE 0.2 MG: 0.2 INJECTION, SOLUTION INTRAMUSCULAR; INTRAVENOUS; SUBCUTANEOUS at 12:02

## 2021-04-01 RX ADMIN — Medication 25 MCG: at 11:10

## 2021-04-01 RX ADMIN — DEXMEDETOMIDINE HCL 4 MCG: 100 INJECTION INTRAVENOUS at 09:22

## 2021-04-01 RX ADMIN — SODIUM CHLORIDE: 9 INJECTION, SOLUTION INTRAVENOUS at 07:52

## 2021-04-01 RX ADMIN — HYDROMORPHONE HYDROCHLORIDE 0.2 MG: 0.2 INJECTION, SOLUTION INTRAMUSCULAR; INTRAVENOUS; SUBCUTANEOUS at 13:25

## 2021-04-01 RX ADMIN — HEPARIN SODIUM 5000 UNITS: 5000 INJECTION INTRAVENOUS; SUBCUTANEOUS at 21:14

## 2021-04-01 RX ADMIN — DOCUSATE SODIUM 100 MG: 100 CAPSULE, LIQUID FILLED ORAL at 17:01

## 2021-04-01 RX ADMIN — DEXMEDETOMIDINE HCL 4 MCG: 100 INJECTION INTRAVENOUS at 09:01

## 2021-04-01 RX ADMIN — HEPARIN SODIUM 8000 UNITS: 1000 INJECTION INTRAVENOUS; SUBCUTANEOUS at 08:53

## 2021-04-01 RX ADMIN — SODIUM CHLORIDE, SODIUM LACTATE, POTASSIUM CHLORIDE, AND CALCIUM CHLORIDE 50 ML/HR: .6; .31; .03; .02 INJECTION, SOLUTION INTRAVENOUS at 10:44

## 2021-04-01 RX ADMIN — FENTANYL CITRATE 25 MCG: 50 INJECTION INTRAMUSCULAR; INTRAVENOUS at 07:52

## 2021-04-01 RX ADMIN — FENTANYL CITRATE 25 MCG: 50 INJECTION INTRAMUSCULAR; INTRAVENOUS at 08:59

## 2021-04-01 RX ADMIN — HYDROMORPHONE HYDROCHLORIDE 0.2 MG: 0.2 INJECTION, SOLUTION INTRAMUSCULAR; INTRAVENOUS; SUBCUTANEOUS at 12:32

## 2021-04-01 RX ADMIN — ONDANSETRON 4 MG: 2 INJECTION INTRAMUSCULAR; INTRAVENOUS at 09:52

## 2021-04-01 RX ADMIN — ATORVASTATIN CALCIUM 80 MG: 80 TABLET, FILM COATED ORAL at 16:54

## 2021-04-01 RX ADMIN — DEXMEDETOMIDINE HCL 4 MCG: 100 INJECTION INTRAVENOUS at 08:45

## 2021-04-01 RX ADMIN — Medication 25 MCG: at 10:44

## 2021-04-01 RX ADMIN — DEXMEDETOMIDINE HCL 4 MCG: 100 INJECTION INTRAVENOUS at 09:31

## 2021-04-01 RX ADMIN — HYDROMORPHONE HYDROCHLORIDE 0.2 MG: 0.2 INJECTION, SOLUTION INTRAMUSCULAR; INTRAVENOUS; SUBCUTANEOUS at 13:09

## 2021-04-01 RX ADMIN — OXYCODONE HYDROCHLORIDE 10 MG: 10 TABLET ORAL at 18:15

## 2021-04-01 RX ADMIN — GLYCOPYRROLATE 0.2 MG: 0.2 INJECTION, SOLUTION INTRAMUSCULAR; INTRAVENOUS at 09:32

## 2021-04-01 RX ADMIN — SODIUM CHLORIDE, SODIUM LACTATE, POTASSIUM CHLORIDE, AND CALCIUM CHLORIDE 125 ML/HR: .6; .31; .03; .02 INJECTION, SOLUTION INTRAVENOUS at 07:28

## 2021-04-01 RX ADMIN — DEXMEDETOMIDINE HCL 4 MCG: 100 INJECTION INTRAVENOUS at 08:56

## 2021-04-01 RX ADMIN — OXYCODONE HYDROCHLORIDE 10 MG: 10 TABLET ORAL at 13:49

## 2021-04-01 RX ADMIN — CHLORHEXIDINE GLUCONATE 0.12% ORAL RINSE 15 ML: 1.2 LIQUID ORAL at 06:37

## 2021-04-01 RX ADMIN — CEFAZOLIN SODIUM 2000 MG: 2 SOLUTION INTRAVENOUS at 08:05

## 2021-04-01 RX ADMIN — LIDOCAINE HYDROCHLORIDE 0.5 ML: 10 INJECTION, SOLUTION EPIDURAL; INFILTRATION; INTRACAUDAL; PERINEURAL at 07:05

## 2021-04-01 NOTE — INTERVAL H&P NOTE
H&P reviewed  After examining the patient I find no changes in the patients condition since the H&P had been written      Vitals:    04/01/21 0640   BP: (!) 173/82   Pulse: 57   Resp: 16   Temp: 98 °F (36 7 °C)   SpO2: 99%

## 2021-04-01 NOTE — OP NOTE
OPERATIVE REPORT  PATIENT NAME: Davion Masterson    :  1950  MRN: [id number]  Pt Location: [facility] OR ROOM 02    SURGERY DATE: 2021    Surgeon(s) and Role:     Shannon Guerra MD - Primary     * Hanane Evans DO - Assisting    Preop Diagnosis:  Asymptomatic bilateral carotid artery stenosis [I65 23]    Post-Op Diagnosis Codes:     * Asymptomatic bilateral carotid artery stenosis [I65 23]    Procedure(s) (LRB):  ANGIOPLASTY ARTERY CAROTID W/ STENT TCAR [address] (Right)    Specimen(s):  * No specimens in log *    Estimated Blood Loss:   Minimal    Drains:  * No LDAs found *    Anesthesia Type:   IV Sedation with Anesthesia    Operative Indications:  Asymptomatic bilateral carotid artery stenosis [I65 23]  Positive stress test and non reconstructible coronary artery disease    Operative Findings:  High grade near occlusive stenosis of proximal right ICA  Complications:   None    Procedure and Technique:        After informed consent was obtained, the patient was brought to the hybrid OR and placed in the supine position  IV sedation was administered and an arterial line monitor was placed  The neck was extended and rotated to expose the right neck  The carotid artery was imaged via ultrasound and the carotid bifurcation was identified proximally 6 cm cephalad to the clavicle  The right neck was then prepped and draped in the regular sterile fashion as was the right groin  A timeout was performed  Local anesthetic was injected just above the clavicle over the sternocleidomastoid muscle  A 2-3 centimeter incision was made in a transverse fashion  Electrocautery was used to dissect through the soft tissue and the platysma was divided  The sternocleidomastoid muscle was identified and the 2 heads were   The internal jugular vein was identified and dissected free longitudinally over a 2-3 centimeters segment  The jugular vein was retracted laterally exposing the common carotid artery   The vagus nerve was identified anterior to the common carotid artery and was preserved throughout dissection  The anterior surface of the common carotid artery was exposed over a 2-3 centimeter segment and encircled inferiorly with a umbilical tape  IV heparin was administered  An ACT level was obtained  Further heparin was administered to maintain a therapeutic level     A pursestring suture was placed on the anterior surface of the common carotid artery with a 5-0 Prolene suture and left untied  The left femoral vein was then imaged via ultrasound and cannulated with a micropuncture kit and a 5 Ivorian sheath was placed  The right common carotid artery was cannulated with a micropuncture system through the pursestring suture  The dilator was then placed and images were obtained of the carotid bifurcation in multiple obliquities  These showed patency of the external carotid artery and a critical stenosis of approximately 95 % in the internal carotid artery over a 2 5 cm segment  An angled wire was then used to cannulate the external carotid artery under roadmap imaging and the sheath was advanced into the external carotid artery and the wire was exchanged for a stiff wire  The reversal of flow sheath was then passed over this wire and positioned in the common carotid artery  The dilator and wire were removed  The sheath was then secured with silk suture  The reversal of flow venous sheath was then placed and the sheaths were connected with the filter which was prepped appropriately to avoid any air in the system  There was noted to be good flow retrograde through the filter  At this point, the common carotid artery was occluded with a vascular clamp  A roadmap image was obtained and a [employer] wire was used to traverse the internal carotid artery stenosis  A 5 x 30 mm balloon was then positioned across the stenosis and inflated  The patient remained hemodynamically stable during this inflation    A 9 x 30 mm self-expanding stent was then positioned across the stenosis and deployed  Completion imaging showed no significant residual stenosis in 2 separate obliquities  The wire was then removed and the vascular clamp was released  The reversal of flow tubing and filter were then disconnected, drained into the venous system, and then removed  The reversal of flow arterial sheath was then removed and the puncture site closed with the previously placed 5-0 Prolene pursestring suture  40 mg of protamine were administered    No bleeding points were identified  The neck wound was then irrigated with antibiotic saline solution  The platysma was reapproximated with a running 3-0 vicryl suture  The skin was closed with a running 4-0 monocryl suture and dressed with histoacryl glue  The femoral sheath was removed and manual compression was held until hemostasis was achieved  The patient remained neurologically intact and awake during this entire procedure  I was present for the entire procedure    Patient Disposition:  PACU       Vascular Quality Initiative - Carotid Artery Stent    Functional Status: Restricted in physically strenuous activity but ambulatory and able to carry out work of a light or sedentary nature      High Risk Factors For CEA: Medical risk CAD>=2 vessel     Refused for Surgery: Yes    Pre-op Imaging is CT/CTA: Yes      Lesion Calcification: 26-50% circumference    Arch Atherosclerosis: Moderate    Urgency: Elective      ASA: IV  Anesthesia:  IV Sedation with Anesthesia    Indication: Asymptomatic stenosis    Skin Prep: Chlor + Alcohol     Arch Type: Type I    Bovine Arch: no     Approach: Carotid open     Medication Loading: None    Prophylactic Anti-bradyarrhythmic yes    Anticoagulant: Heparin    Antiplatelet IIb/IIIa: no    Bradyarrhythmia Requiring Tx: No    Fluoro Time: 8 min    Distinct Lesions Treated: 1      If Distinct Lesions Treated is 1, Second Stenosis (Not Treated): No    Lesion Type: Atherosclerosis  Lesion Side: right    Lesion Location: ICA     ICA Distal Tortuosity: None/Mild    Lesion Length: 25mm    Lesion Stenosis:95%    Protection Device Used: Yes, successful      Protection Device Type: Flow reversal     Flow Reversal Type: [address] ENROUTE      Flow Reversal Time: 14 minutes    Pre-dilate Before Protection Device: yes  Angioplasty required in order to cross lesion with protection device  For TCAR procedures answer yes if vessel pre-dilated prior to stent placement, or no if not pre-dilated before stent placement  Pre-dilate Lesion: yes  Pre-dilate Lesion: Angioplasty required in order to cross lesion for stent placement  Required even if there is a technical failure in stent deployment       Technical Failure: No       Number of Stents: 1    Post- dilate: No      Neurologic Change: No    Intra-Cranial Completion Angiogram: No    Total Procedure Time:   Event Time In   Procedure Start  8:30 AM   Procedure Closing    Procedure Finish 10:02 AM               Patient Disposition:  PACU     SIGNATURE: Kaycee Moscoso MD  DATE: April 1, 2021  TIME: 1:36 PM

## 2021-04-01 NOTE — DISCHARGE INSTRUCTIONS
DISCHARGE INSTRUCTIONS  CAROTID ARTERIOGRAM/STENT    Following discharge from the hospital, you may have some questions about your procedure, your activities or your general condition  These instructions may answer some of your questions and help you adjust during the first few weeks following your procedure  ACTIVITY:   Resume your normal activities and exercise schedule as tolerated  If you were driving prior to the procedure, you may resume driving one week following discharge from the hospital     DIET:  Resume your normal diet  Try to eat low fat and low cholesterol foods  Drink more liquids than usual for the next 24 hours  RECURRENT SYMPTOMS If you develop any new numbness, weakness, blindness, or difficulty with speech after discharge CALL 911 or go to the nearest Emergency department immediately  INCISION: Your physician may have chosen to use a type of adhesive glue, to close your incision  The glue is used to cover the incision, assist in closure, and prevent contamination  This adhesive will darken and peel away on its own within one to two weeks  You may shower after your surgery, but do not scrub the incision  Sitting in a tub is not recommended for the two days following surgery or if you have any open wounds  It is normal to have some bruising, swelling or mild discoloration around the incision  IF increasing redness or pain develops, call our office immediately  If one is present, you may remove the dressing, band-aid or steri-strips over your wound after two days  If you notice any active bleeding, apply pressure to the site and call our office ([phone])  FOLLOW UP STUDIES:  Doppler ultrasound studies are very important to your post-procedure care  Your Physician will arrange for them at your first post-procedure visit        Mal olmos/ Jaret Romo PAC: 4/20/2021 at 10:15am, [place] office      PLEASE CALL THE OFFICE IF YOU HAVE ANY QUESTIONS    [phone]  FAX [address] [facility] [phone]  [address] [facility] , [address], [place], [address]  [address], [address] [facility]  [address]   [facility], [place], [address]  [address] [facility], [address] [facility], [facility], [address]    [address], [address]  [address] [facility], [facility] [address]   [address] [facility], [address] [facility], [place], [address] [facility]  [address], [address] [facility], Shyann Mckenzie

## 2021-04-01 NOTE — CONSULTS
Consultation - Nephrology   Martin Ramirez 79 y o  male MRN: [id number]  Unit/Bed#: OR POOL Encounter: [id number]    ASSESSMENT and PLAN:  Perioperative optimization to reduce the incidence of acute kidney injury in the setting Chronic kidney disease IIIA:    Etiology: Suspect secondary to hypertensive nephrosclerosis, arterial nephrosclerosis, prior nephrolithiasis, and age-related nephron loss  Assessment:   After review medical records through [facility] and Care everywhere [facility] baseline creatinine between 1 4-1 65 dating back to 2019 with EGFR mid 40 range   Most recent creatinine 1 45 with stable electrolytes and acid-base balance on 03/24/2021   Patient reports taking lisinopril and amlodipine this a m     Patient does not follow with Nephrology   Need follow-up on discharge  Plan:   Continue IV fluids as ordered per primary team   Continue to hold lisinopril while admitted   Okay to use amlodipine for blood pressure control with hold parameters for SBP less than 130   Avoid hypotension to prevent decreased renal perfusion   Avoid NSAIDs, IV contrast or nephrotoxins   Will continue monitor I/O, lab values volume status    Blood pressure/hypertension:  BP currently acceptable  Assessment and Plan:   Home medications include:  Amlodipine 5 mg daily, lisinopril 20 mg daily   As above, recommend holding lisinopril 20 mg daily   Okay to use amlodipine 5 mg daily for blood pressure control with hold parameters for SBP less than 130   Maximize hemodynamics to maintain MAP >65   Avoid hypotension or fluctuations in blood pressure   Will continue to trend    H&H:   Assessment and Plan:   Current hemoglobin 16 2 from 03/24/2021   Per primary team   Transfuse if hemoglobin less than 7 0    Acid-base: Current bicarb 26  Assessment and Plan:   Will continue monitor    Other medical issues:  Asymptomatic bilateral carotid artery stenosis:  Status post right TCAR 4/1/2021  Vascular following      Requesting Physician: Baron Sumi MD  Reason for Consult:  Perioperative optimization to reduce the incidence of acute kidney injury in the setting of Chronic Kidney Disease    Damir Maria is a 79 y o  male who has past medical history of Chronic Kidney Disease IIIA, nephrolithiasis, CAD, cerebral AVM, left vertebral artery occlusion, and bilateral carotid artery stenosis who was electively admitted and underwent right TCAR with vascular surgery  A renal consultation is requested today for perioperative optimization to reduce the incidence of acute kidney injury in the setting of Chronic Kidney Disease  On discussion the patient is unaware of having Chronic Kidney Disease and only follows with PCP  He reports having a history of nephrolithiasis however no recent kidney stones in some time  He reports does not drink enough water  Currently denies chest pain, shortness of breath, dizziness, lightheadedness, nausea, vomiting, known urinary issues, fevers, chills or lower extremity edema  PAST MEDICAL HISTORY:  Past Medical History:   Diagnosis Date    Bilateral carotid artery stenosis     Cerebral arteriovenous malformation (AVM)     Coronary artery disease     High blood pressure     Hyperlipidemia     Hypertension     PVD (peripheral vascular disease) with claudication     Vertebral artery occlusion, left        PAST SURGICAL HISTORY:  Past Surgical History:   Procedure Laterality Date    CARDIAC CATHETERIZATION  02/08/2021    Mid LAD eccentric 50-60% stenosis, distal LAD 50% stenosis, apical LAD very small caliber vessel with discrete 90% stenosis after it wraps around apex, proximal ramus 40% stenosis, Co-dominant RCA has proximal aneurysm followed by distal 50% stenosis, RPLA is small to medium caliber vessel will discrete 70% stenosis    If patient remains symptomatic even after medication optimization then considera    COLONOSCOPY      LITHOTRIPSY      SKIN BIOPSY ALLERGIES:  Allergies   Allergen Reactions    Butalbital-Apap-Caffeine Rash     Hands swell/redness       SOCIAL HISTORY:  Social History     Substance and Sexual Activity   Alcohol Use Yes    Frequency: 2-3 times a week    Drinks per session: 3 or 4     Social History     Substance and Sexual Activity   Drug Use Never     Social History     Tobacco Use   Smoking Status Former Smoker    Packs/day: 0 50    Types: Cigarettes   Smokeless Tobacco Never Used       FAMILY HISTORY:  Family History   Problem Relation Age of Onset    Stroke Mother     Heart disease Mother     Stroke Father     Lung cancer Father        MEDICATIONS:    Current Facility-Administered Medications:     [START ON 4/2/2021] amLODIPine (NORVASC) tablet 5 mg, 5 mg, Oral, Daily, Francisca Rosas DO    [START ON 4/2/2021] aspirin (ECOTRIN LOW STRENGTH) EC tablet 81 mg, 81 mg, Oral, Daily, Francisca Rosas DO    atorvastatin (LIPITOR) tablet 80 mg, 80 mg, Oral, Daily With Dinner, Francisca Rosas DO    ceFAZolin (ANCEF) IVPB (premix in dextrose) 2,000 mg 50 mL, 2,000 mg, Intravenous, Once, Barrie Haney MD  [facility]  [START ON 4/2/2021] clopidogrel (PLAVIX) tablet 75 mg, 75 mg, Oral, Daily, Francisca Rosas DO    docusate sodium (COLACE) capsule 100 mg, 100 mg, Oral, BID, Francisca Rosas DO    fentaNYL (SUBLIMAZE) injection 25 mcg, 25 mcg, Intravenous, Q3 min PRN, Lorena Stone MD, 25 mcg at 04/01/21 1044    heparin (porcine) 2,000 Units, papaverine 60 mg in multi-electrolyte (PLASMALYTE-A/ISOLYTE-S PH 7 4) 500 mL irrigation, , Irrigation, Once, Barrie Haney MD    heparin (porcine) subcutaneous injection 5,000 Units, 5,000 Units, Subcutaneous, Q8H [facility] **AND** Platelet count, , , Once, Francisca Rosas DO    Labetalol HCl (NORMODYNE) injection 5 mg, 5 mg, Intravenous, Q15 Min PRN **AND** hydrALAZINE (APRESOLINE) injection 15 mg, 15 mg, Intravenous, Q15 Min PRN **AND** niCARdipine (CARDENE) 25 mg (STANDARD CONCENTRATION) in sodium chloride 0 9% 250 mL, 1-15 mg/hr, Intravenous, Continuous PRN, Beatrizmer Lab, DO    HYDROmorphone HCl (DILAUDID) injection 0 2 mg, 0 2 mg, Intravenous, Q5 Min PRN, Florida Estrella MD    iodixanol (VISIPAQUE) 320 MG/ML injection 50 mL, 50 mL, Intravenous, Once in imaging, Maria A Hamm MD    lactated ringers infusion, 50 mL/hr, Intravenous, Continuous, Areli Lab, DO, Last Rate: 50 mL/hr at 04/01/21 1044, 50 mL/hr at 04/01/21 1044    ondansetron (ZOFRAN) injection 4 mg, 4 mg, Intravenous, Q6H PRN, Beatrizmer Lab, DO    ondansetron [facility]) injection 4 mg, 4 mg, Intravenous, Once PRN, Florida Estrella MD    oxyCODONE (ROXICODONE) IR tablet 10 mg, 10 mg, Oral, Q4H PRN, Beatrizmer Lab, DO    oxyCODONE (ROXICODONE) IR tablet 5 mg, 5 mg, Oral, Q4H PRN, Beatrizmer Lab, DO    sodium chloride 0 9 % bolus 500 mL, 500 mL, Intravenous, Once **FOLLOWED BY** phenylephrine (KIP-SYNEPHRINE) 50 mg (STANDARD CONCENTRATION) in sodium chloride 0 9% 250 mL,  mcg/min, Intravenous, Titrated, Chalmer Lab, DO    sodium chloride 0 9 % bolus 110 mL, 110 mL, Intravenous, Once, ADELINA David      REVIEW OF SYSTEMS:  A complete 10 point review of systems was performed and found to be negative unless otherwise noted below or in the HPI  General: No fevers, chills, weakness  Cardiovascular:  Denies chest pain, palpitations, or leg edema  Respiratory:  Denies cough, sputum production, shortness of breath  Gastrointestinal:  Denies nausea, vomiting, abdominal pain, diarrhea or constipation    Genitourinary: No dysuria, burning, hematuria or increased frequency or difficulty with stream     PHYSICAL EXAM:  Current Weight: Weight - Scale: 88 5 kg (195 lb)  First Weight: Weight - Scale: 88 5 kg (195 lb)  Vitals:    04/01/21 0640 04/01/21 1012 04/01/21 1015   BP: (!) 173/82 128/71    Pulse: 57 72 74   Resp: 16 20 (!) 23   Temp: 98 °F (36 7 °C) 97 8 °F (36 6 °C)    TempSrc: Tympanic Temporal    SpO2: 99% 96% 93%   Weight: 88 5 kg (195 lb) Height: 5' 11" (1 803 m)         Intake/Output Summary (Last 24 hours) at 4/1/2021 1102  Last data filed at 4/1/2021 1044  Gross per 24 hour   Intake 1150 ml   Output --   Net 1150 ml     General: conscious, coherent, cooperative, not in acute distress  Skin: no rash, warm and dry  Eyes: pale conjunctivae, anicteric sclerae  ENT:  Dry lips and mucous membranes  Neck: supple, no JVD noted  Chest: clear breath sounds without crackles, rhonchi, wheezes noted  CVS: normal rate, regular rhythm without rub  Abdomen: soft, non-tender, non-distended, normoactive bowel sounds  Extremities: no edema of both legs noted  Neuro: awake, alert, oriented  Psych: appropriate affect        Lab Results:       Other Studies:

## 2021-04-01 NOTE — ANESTHESIA POSTPROCEDURE EVALUATION
Post-Op Assessment Note    CV Status:  Stable  Pain Score: 0    Pain management: adequate     Mental Status:  Alert and awake   Hydration Status:  Euvolemic   PONV Controlled:  Controlled   Airway Patency:  Patent      Post Op Vitals Reviewed: Yes      Staff: CRNA         No complications documented      BP  130/78   Temp   97 8   Pulse  77   Resp   12   SpO2   98

## 2021-04-02 VITALS
RESPIRATION RATE: 20 BRPM | SYSTOLIC BLOOD PRESSURE: 142 MMHG | BODY MASS INDEX: 27.3 KG/M2 | TEMPERATURE: 99.5 F | WEIGHT: 195 LBS | DIASTOLIC BLOOD PRESSURE: 71 MMHG | HEIGHT: 71 IN | HEART RATE: 58 BPM | OXYGEN SATURATION: 96 %

## 2021-04-02 LAB
ANION GAP SERPL CALCULATED.3IONS-SCNC: 6 MMOL/L (ref 4–13)
APTT PPP: 34 SECONDS (ref 23–37)
BUN SERPL-MCNC: 15 MG/DL (ref 5–25)
CALCIUM SERPL-MCNC: 8.7 MG/DL (ref 8.3–10.1)
CHLORIDE SERPL-SCNC: 108 MMOL/L (ref 100–108)
CO2 SERPL-SCNC: 25 MMOL/L (ref 21–32)
CREAT SERPL-MCNC: 1.21 MG/DL (ref 0.6–1.3)
ERYTHROCYTE [DISTWIDTH] IN BLOOD BY AUTOMATED COUNT: 12.1 % (ref 11.6–15.1)
GFR SERPL CREATININE-BSD FRML MDRD: 60 ML/MIN/1.73SQ M
GLUCOSE SERPL-MCNC: 95 MG/DL (ref 65–140)
HCT VFR BLD AUTO: 41.5 % (ref 36.5–49.3)
HGB BLD-MCNC: 13.4 G/DL (ref 12–17)
INR PPP: 1.11 (ref 0.84–1.19)
MCH RBC QN AUTO: 30.9 PG (ref 26.8–34.3)
MCHC RBC AUTO-ENTMCNC: 32.3 G/DL (ref 31.4–37.4)
MCV RBC AUTO: 96 FL (ref 82–98)
PLATELET # BLD AUTO: 204 THOUSANDS/UL (ref 149–390)
PMV BLD AUTO: 11.2 FL (ref 8.9–12.7)
POTASSIUM SERPL-SCNC: 3.8 MMOL/L (ref 3.5–5.3)
PROTHROMBIN TIME: 14.3 SECONDS (ref 11.6–14.5)
RBC # BLD AUTO: 4.33 MILLION/UL (ref 3.88–5.62)
SODIUM SERPL-SCNC: 139 MMOL/L (ref 136–145)
WBC # BLD AUTO: 7.77 THOUSAND/UL (ref 4.31–10.16)

## 2021-04-02 PROCEDURE — 99024 POSTOP FOLLOW-UP VISIT: CPT | Performed by: PHYSICIAN ASSISTANT

## 2021-04-02 PROCEDURE — 97163 PT EVAL HIGH COMPLEX 45 MIN: CPT

## 2021-04-02 PROCEDURE — 99232 SBSQ HOSP IP/OBS MODERATE 35: CPT | Performed by: INTERNAL MEDICINE

## 2021-04-02 PROCEDURE — 85730 THROMBOPLASTIN TIME PARTIAL: CPT | Performed by: STUDENT IN AN ORGANIZED HEALTH CARE EDUCATION/TRAINING PROGRAM

## 2021-04-02 PROCEDURE — 80048 BASIC METABOLIC PNL TOTAL CA: CPT | Performed by: STUDENT IN AN ORGANIZED HEALTH CARE EDUCATION/TRAINING PROGRAM

## 2021-04-02 PROCEDURE — 99024 POSTOP FOLLOW-UP VISIT: CPT | Performed by: SURGERY

## 2021-04-02 PROCEDURE — 85027 COMPLETE CBC AUTOMATED: CPT | Performed by: STUDENT IN AN ORGANIZED HEALTH CARE EDUCATION/TRAINING PROGRAM

## 2021-04-02 PROCEDURE — 85610 PROTHROMBIN TIME: CPT | Performed by: STUDENT IN AN ORGANIZED HEALTH CARE EDUCATION/TRAINING PROGRAM

## 2021-04-02 PROCEDURE — 97166 OT EVAL MOD COMPLEX 45 MIN: CPT

## 2021-04-02 RX ORDER — OXYCODONE HYDROCHLORIDE 5 MG/1
5 TABLET ORAL EVERY 4 HOURS PRN
Qty: 10 TABLET | Refills: 0 | Status: SHIPPED | OUTPATIENT
Start: 2021-04-02 | End: 2021-04-02

## 2021-04-02 RX ORDER — LISINOPRIL 20 MG/1
20 TABLET ORAL DAILY
COMMUNITY
Start: 2021-04-03

## 2021-04-02 RX ORDER — AMLODIPINE BESYLATE 5 MG/1
5 TABLET ORAL DAILY
Refills: 0 | COMMUNITY
Start: 2021-04-02

## 2021-04-02 RX ORDER — OXYCODONE HYDROCHLORIDE 5 MG/1
5 TABLET ORAL EVERY 4 HOURS PRN
Qty: 10 TABLET | Refills: 0 | Status: SHIPPED | OUTPATIENT
Start: 2021-04-02 | End: 2021-04-12

## 2021-04-02 RX ORDER — ATORVASTATIN CALCIUM 80 MG/1
80 TABLET, FILM COATED ORAL DAILY
COMMUNITY
Start: 2021-04-02

## 2021-04-02 RX ADMIN — CLOPIDOGREL BISULFATE 75 MG: 75 TABLET ORAL at 08:16

## 2021-04-02 RX ADMIN — ASPIRIN 81 MG: 81 TABLET, COATED ORAL at 08:16

## 2021-04-02 RX ADMIN — OXYCODONE HYDROCHLORIDE 5 MG: 5 TABLET ORAL at 05:14

## 2021-04-02 RX ADMIN — HEPARIN SODIUM 5000 UNITS: 5000 INJECTION INTRAVENOUS; SUBCUTANEOUS at 05:05

## 2021-04-02 RX ADMIN — MICONAZOLE NITRATE 1 APPLICATION: 2 CREAM TOPICAL at 08:18

## 2021-04-02 RX ADMIN — DOCUSATE SODIUM 100 MG: 100 CAPSULE, LIQUID FILLED ORAL at 08:16

## 2021-04-02 RX ADMIN — AMLODIPINE BESYLATE 5 MG: 5 TABLET ORAL at 08:16

## 2021-04-02 NOTE — CASE MANAGEMENT
LOS:  1day  Bundle: no  Readmission: no    Explained role of CM to pt  CM obtained the following information from pt  Home: Lives in a house with 3 levels and a flight of stairs in between each level  [address]  Lives with: Spouse  DME: denies  Ambulation: independent  Drives: yes  Pharmacy: [facility]  PCP: [id number] [address] History: oanh laird  denies inpatient psychiatric stay ever  Substance Use/Abuse History: denies  [facility] History: denies  Work/Retired:  retired  Rehab History: denies  POA/LW: no and declined information on LW/POA  Transport at [employer]:  Avani ESTRADA reviewed d/c planning process including the following: identifying help at home, patient preference for d/c planning needs, [facility] Meds to Bed program      CM asked pt if he was unable to make decisions for himself who would he want to make decisions for him, he said his wife Abhishek Irving and his son Telly Calles  Cm asked pt if CM can call anyone for him  He declined

## 2021-04-02 NOTE — PROGRESS NOTES
Progress Note - Nephrology   Jamie Snyder 79 y o  male MRN: [id number]  Unit/Bed#: [facility] 525-01 Encounter: [id number]      Assessment / Plan:     CKD stage 3  ·  baseline creatinine of 1 4-1 6  ·  etiology suspected to be hypertension  ·  hold lisinopril but resume this on discharge  ·  urinalysis reveals no hematuria or proteinuria  ·  creatinine currently stable and below baseline  ·  the patient was counseled to avoid nonsteroidals  ·  will arrange follow-up in our [facility] office   hypertension  ·  blood pressure is currently acceptable  ·  resume lisinopril on discharge   volume status  ·  euvolemic on exam   asymptomatic bilateral carotid artery stenosis  ·  status post right stent TCAR    Disposition:  Okay for discharge renal wise    Subjective:    the patient was seen examined  He denied any shortness of breath, chest pain or pressure, abdominal pain, vomiting, diarrhea, sweats, chills, paroxysmal nocturnal dyspnea, orthopnea, difficulty swallowing, neurologic issues  Objective:     Vitals: Blood pressure 141/89, pulse 71, temperature 98 8 °F (37 1 °C), temperature source Oral, resp  rate 20, height 5' 11" (1 803 m), weight 88 5 kg (195 lb), SpO2 97 %  ,Body mass index is 27 2 kg/m²  Temp (24hrs), Av 4 °F (36 9 °C), Min:97 8 °F (36 6 °C), Max:99 2 °F (37 3 °C)      Weight (last 2 days)     Date/Time   Weight    21 0640   88 5 (195)                     Intake/Output Summary (Last 24 hours) at 2021 0910  Last data filed at 2021 0731  Gross per 24 hour   Intake 2550 ml   Output 1125 ml   Net 1425 ml     I/O last 24 hours: In: 2550 [P O :720;  I V :1830]  Out: 1125 [Urine:1125]        Physical Exam    /89   Pulse 71   Temp 98 8 °F (37 1 °C) (Oral)   Resp 20   Ht 5' 11" (1 803 m)   Wt 88 5 kg (195 lb)   SpO2 97%   BMI 27 20 kg/m²   Vital signs were reviewed  Constitutional:    The patient was awake, alert, pleasant, cooperative and in no apparent distress  Cardiovascular:  No overt jugular venous distention was noted, S1-S2, no pericardial friction rub or S3 were appreciated  Pulmonary:  Lungs were clear to auscultation percussion, adequate inspiratory effort, no rales / rhonchi wheezing noted  Abdominal/GI:  Soft, nontender, no rebound or guarding was noted  Skin:  No hives were noted            Invasive Devices     Peripheral Intravenous Line            Peripheral IV 04/01/21 Left Wrist 1 day    Peripheral IV 04/01/21 Left Antecubital less than 1 day                Medications:    Scheduled Meds:  Current Facility-Administered Medications   Medication Dose Route Frequency Provider Last Rate    amLODIPine  5 mg Oral Daily Kali Roberts PA-C      aspirin  81 mg Oral Daily Antonella Hews, DO      atorvastatin  80 mg Oral Daily With Kristin Rodriguez, DO      clopidogrel  75 mg Oral Daily Antonella Hews, DO      docusate sodium  100 mg Oral BID Antonella Hews, DO      heparin (porcine)  5,000 Units Subcutaneous [facility] Antonella Hews, DO      Labetalol HCl  5 mg Intravenous Q15 Min PRN Antonella Hews, DO      And    hydrALAZINE  15 mg Intravenous Q15 Min PRN Antonella Hews, DO      And    niCARdipine  1-15 mg/hr Intravenous Continuous PRN Antonella Hews, DO      melatonin  3 mg Oral HS Kat Joe MD      miconazole   Topical BID Kat Joe MD      ondansetron  4 mg Intravenous Q6H PRN Antonella Hews, DO      oxyCODONE  10 mg Oral Q4H PRN Antonella Hews, DO      oxyCODONE  5 mg Oral Q4H PRN Antonella Hews, DO      sodium chloride  500 mL Intravenous Once Antonella Hews, DO      Followed by   Clarissa Cruz phenylephine   mcg/min Intravenous Titrated Antonella Hews, DO         PRN Meds: Labetalol HCl **AND** hydrALAZINE **AND** niCARdipine    ondansetron    oxyCODONE    oxyCODONE    Continuous Infusions:niCARdipine, 1-15 mg/hr  phenylephine,  mcg/min            LAB RESULTS:      Results from last 7 days   Lab Units 04/02/21  0512   WBC Thousand/uL 7 77 HEMOGLOBIN g/dL 13 4   HEMATOCRIT % 41 5   PLATELETS Thousands/uL 204   POTASSIUM mmol/L 3 8   CHLORIDE mmol/L 108   CO2 mmol/L 25   BUN mg/dL 15   CREATININE mg/dL 1 21   CALCIUM mg/dL 8 7       CUTURES:  No results found for: Pedro Bang              PLEASE NOTE:  This encounter was completed utilizing the [employer] Direct Speech Voice Recognition Software  Grammatical errors, random word insertions, pronoun errors and incomplete sentences are occasional consequences of the system due to software limitations, ambient noise and hardware issues  These may be missed by proof reading prior to affixing electronic signature  Any questions or concerns about the content, text or information contained within the body of this dictation should be directly addressed to the physician for clarification  Please do not hesitate to call me directly if you have any any questions or concerns

## 2021-04-02 NOTE — OCCUPATIONAL THERAPY NOTE
Occupational Therapy Evaluation     Patient Name: Luis Manuel Mitchell  [id number] Date: 4/2/2021  Problem List  Principal Problem:    Asymptomatic bilateral carotid artery stenosis  Active Problems:    CAD (coronary artery disease)    Past Medical History  Past Medical History:   Diagnosis Date    Bilateral carotid artery stenosis     Cerebral arteriovenous malformation (AVM)     Coronary artery disease     High blood pressure     Hyperlipidemia     Hypertension     PVD (peripheral vascular disease) with claudication     Vertebral artery occlusion, left      Past Surgical History  Past Surgical History:   Procedure Laterality Date    CARDIAC CATHETERIZATION  02/08/2021    Mid LAD eccentric 50-60% stenosis, distal LAD 50% stenosis, apical LAD very small caliber vessel with discrete 90% stenosis after it wraps around apex, proximal ramus 40% stenosis, Co-dominant RCA has proximal aneurysm followed by distal 50% stenosis, RPLA is small to medium caliber vessel will discrete 70% stenosis  If patient remains symptomatic even after medication optimization then considera    COLONOSCOPY      IR CAROTID STENT  4/1/2021    LITHOTRIPSY      SKIN BIOPSY           04/02/21 0903   OT Last Visit   OT Visit Date 04/02/21   Note Type   Note type Evaluation   Restrictions/Precautions   Other Precautions Telemetry;Multiple lines   Pain Assessment   Pain Assessment Tool FLACC   Pain Rating: FLACC (Rest) - Face 0   Pain Rating: FLACC (Rest) - Legs 0   Pain Rating: FLACC (Rest) - Activity 0   Pain Rating: FLACC (Rest) - Cry 1   Pain Rating: FLACC (Rest) - Consolability 0   Score: FLACC (Rest) 1   Pain Rating: FLACC (Activity) - Face 1   Pain Rating: FLACC (Activity) - Legs 0   Pain Rating: FLACC (Activity) - Activity 1   Pain Rating: FLACC (Activity) - Cry 1   Pain Rating: FLACC (Activity) - Consolability 1   Score: FLACC (Activity) 4   Home Living   Type of Home House   Home Layout Multi-level;1/2 bath on main level; Work area in basement   Bathroom Shower/Tub Tub/shower unit   Bathroom Toilet Standard   Prior Function   Level of [place] Independent with ADLs and functional mobility   Lives With [place] Help From Family   ADL Assistance Independent   IADLs Independent   Falls in the last 6 months 0   Vocational Retired   Lifestyle   Autonomy pta pt reports I in ADls/IADLs/functional mobility   Reciprocal Relationships supportive spouse   Service to Others retired- worked for [employer] enjoys spending time in his newly renovated man [facility]   Psychosocial   Psychosocial (WDL) [address] "I have too much pain to reach my feet"   ADL   Where Assessed Chair   Eating Assistance 7  [address] 5  Supervision/Setup   LB [address] 5  [address] 5  Supervision/Setup    [facility] 4  [address]  5  Supervision/Setup   Bed Mobility   Supine to Sit 4  Minimal assistance   Additional items Assist x 1   Transfers   Sit to Stand 5  Supervision   Additional items Increased time required   Stand to Sit 5  Supervision   Additional items Increased time required   Functional Mobility   Functional Mobility 5  Supervision   Balance   Static Sitting Good   Dynamic Sitting Fair +   Static Standing Fair   Dynamic Standing [id number]   Activity Tolerance   Activity Tolerance Patient tolerated treatment well   Medical Staff Made Aware  [facility] okay to see per RN   RUE Assessment   RUE Assessment WFL   LUE Assessment   LUE Assessment WFL   Hand Function   Gross Motor Coordination Functional   Fine Motor Coordination Functional   Cognition   Overall Cognitive Status WFL   Arousal/Participation Cooperative   Attention Within functional limits   Orientation Level Oriented X4   Memory Within functional limits   Following Commands Follows all commands and directions without difficulty   Comments pt pleasant and cooperative   Assessment   Prognosis Good   Assessment Pt is a 79 y o  YO  male admitted to [facility] on 4/1/2021 w/ bilateral carotid artery stenosis s/p TCAR  Pt  has a past medical history of Bilateral carotid artery stenosis, Cerebral arteriovenous malformation (AVM), Coronary artery disease, High blood pressure, Hyperlipidemia, Hypertension, PVD (peripheral vascular disease) with claudication, and Vertebral artery occlusion, left  Pt with active OT orders  Pt resides in a multi level home with spouse  Pt was I w/  ADLS and IADLS, (+) drove, & required no use of DME PTA  Currently pt is Min A for LB dressing (reports he wears flip flops at home), supervision for all other ADLs, and supervision for transfers/functional mobility  Pt is limited at this time 2*: pain, endurance, activity tolerance and decreased I w/ ADLS/IADLS  The following Occupational Performance Areas to address include: community mobility, household maintenance and care of children  Based on the aforementioned OT evaluation, functional performance deficits, and assessments, pt has been identified as a moderate complexity evaluation  From OT standpoint, anticipate d/c home with family support  The patient's raw score on the AM-PAC Daily Activity inpatient short form is 22, standardized score is 47 1, greater than 39 4  Patients at this level are likely to benefit from discharge to home  Please refer to the recommendation of the Occupational Therapist for safe discharge planning  Recommend continued participation in ADLs and functional mobility w/ staff  No further acute OT needs, d/c OT  Please re-consult if necessary      Goals   Patient Goals go home   Recommendation   OT Discharge Recommendation Return to previous environment with social support   OT - OK to Discharge Yes   AM-PAC Daily Activity Inpatient   Lower Body Dressing 3   Bathing 3   Toileting 4   Upper Body Dressing 4   Grooming 4   Eating 4   Daily Activity Raw Score 22   Daily Activity Standardized Score (Calc for Raw Score >=11) 47  1   AM-PAC Applied Cognition Inpatient   Following a Speech/Presentation 4   Understanding Ordinary Conversation 4   Taking Medications 4   Remembering Where Things Are Placed or Put Away 4   Remembering List of 4-5 Errands 4   Taking Care of Complicated Tasks 4   Applied Cognition Raw Score 24   Applied Cognition Standardized Score 62 21   Modified Robinson Scale   Modified Jonathan Scale 2     Gabo Cutler MS, OTR/L

## 2021-04-02 NOTE — DISCHARGE SUMMARY
Discharge Summary   Tang Torres 79 y o  male MRN: [id number]  Unit/Bed#: [facility] 525-01 Encounter: [id number]    Admission Date: 4/1/2021     Discharge Date:04/02/21    Attending:Alejandro Amor MD     Consultants:  nephrology    Admitting Diagnosis: Asymptomatic bilateral carotid artery stenosis [I65 23]    Principle/ Secondary Diagnosis:  ·  asymptomatic right carotid stenosis  ·  CKD 3    Past Medical History:   Diagnosis Date    Bilateral carotid artery stenosis     Cerebral arteriovenous malformation (AVM)     Coronary artery disease     High blood pressure     Hyperlipidemia     Hypertension     PVD (peripheral vascular disease) with claudication     Vertebral artery occlusion, left      Past Surgical History:   Procedure Laterality Date    CARDIAC CATHETERIZATION  02/08/2021    Mid LAD eccentric 50-60% stenosis, distal LAD 50% stenosis, apical LAD very small caliber vessel with discrete 90% stenosis after it wraps around apex, proximal ramus 40% stenosis, Co-dominant RCA has proximal aneurysm followed by distal 50% stenosis, RPLA is small to medium caliber vessel will discrete 70% stenosis  If patient remains symptomatic even after medication optimization then considera    COLONOSCOPY      IR CAROTID STENT  4/1/2021    LITHOTRIPSY      SKIN BIOPSY         Procedures Performed:   ·  04/01/2021 right TCAR- Eddie    Laboratory data at discharge:   Results from last 7 days   Lab Units 04/02/21  0512   WBC Thousand/uL 7 77   HEMOGLOBIN g/dL 13 4   HEMATOCRIT % 41 5   PLATELETS Thousands/uL 204     Results from last 7 days   Lab Units 04/02/21  0512   POTASSIUM mmol/L 3 8   CHLORIDE mmol/L 108   CO2 mmol/L 25   BUN mg/dL 15   CREATININE mg/dL 1 21   CALCIUM mg/dL 8 7     Results from last 7 days   Lab Units 04/02/21  0512   INR  1 11   PTT seconds 34           Discharge instructions/Information to patient and family:   See after visit summary for information provided to patient and family     · Carotid stent instructions    Discharge Medications:  See after visit summary for reconciled discharge medications provided to patient and family  ·  continued DAPT -aspirin 81 mg and Plavix 75 mg daily  ·  continued statin -Lipitor  ·  resumption of Ace inhibitor (lisinopril ) on 04/03/2021, 48 hours postop    Hospital Course:   Martin Baldwin is a 77y/o male with a history of CAD, HTN, GERD, CKD 3, and left cranial AVM followed by neuro surgery who was also found to have asymptomatic right carotid stenosis  He was worked up in the outpatient setting and deemed appropriate for surgical intervention  He underwent cardiac risk stratification  On 04/01/2021, the patient was electively admitted to [facility] at which time he was taken to the operating room and underwent right TCAR by Dr Morgan  Postoperatively, he was maintained in the PACU then transferred to medical-surgical /telemetry /step-down floor where he continued to convalesce remainder of his hospitalization  His hospitalization was without event  By POD #1,  He remained neurovascularly intact  His wound was stable  He was tolerating a diet  His pain was controlled  He was ambulatory  He was voiding spontaneously  He was deemed appropriate and stable for discharge and was discharged in the care of his family on 04/02/2021  Hospital course was complicated by the following:  ·   CKD 3  With a history of CKD 3 and baseline creatinine 1 4-1 6, consultation was placed to our nephrology colleagues for a KI risk reduction  Nephrotoxic agents were held  Hypotension was avoided  IV fluid hydration was provided  Renal function was closely monitored and remained at or below baseline  No major changes were made to his medication regimen  He was deemed appropriate and stable for discharge home with no medication changes  Prior to discharge, the patient was given instructions for outpatient care and follow-up    The patient has been instructed to call w/ any questions, changes, or concerns for the medical condition  For further details of the hospitalization, please refer to the medical record  Condition at Discharge: stable     Provisions for Follow-Up Care:  See after visit summary for information related to follow-up care and any pertinent home health orders  Disposition: Home    Planned Readmission: No    Discharge Statement   I spent 30 minutes discharging the patient  This time was spent on the day of discharge  I had direct contact with the patient on the day of discharge  Additional documentation is required if more than 30 minutes were spent on discharge  Joan Brown PA-C  4/2/2021      This text is generated with voice recognition software  There may be translation, syntax,  or grammatical errors  If you have any questions, please contact the dictating provider

## 2021-04-02 NOTE — QUICK NOTE
Post- OP Note - Vascular Surgery   Pratibha Parks 79 y o  male MRN: [id number]  Unit/Bed#: [facility] 525-01 Encounter: [id number]    Assessment:  79 M with asymptomatic b/l carotid artery stenosis s/p TCAR 4/1    Plan:   Diet as tolerated   Monitor BP - goals 100-140 due to Intracranial AVM   Pain Control   DVT PPX   Monitor Neuro exam      Subjective/Objective     Subjective:   Patient alert and oriented  No n/v   No chest pains or SOB  Some soreness with swallowing  Objective:    Blood pressure 154/75, pulse 61, temperature 98 3 °F (36 8 °C), temperature source Oral, resp  rate 18, height 5' 11" (1 803 m), weight 88 5 kg (195 lb), SpO2 93 %  ,Body mass index is 27 2 kg/m²  Physical Exam:   Gen: NAD  HEENT: MMM  CV: well perfused  Pulses:  Lungs: Normal respiratory effort  Abd: soft, nontender, nondistended  Extremities: no cyanosis or edema  Skin: warm/ dry  Neuro:  AxO x3, CN 2-12 intact

## 2021-04-02 NOTE — PROGRESS NOTES
Progress Note - Vascular Surgery   Gib Seats 79 y o  male MRN: [id number]  Unit/Bed#: [facility] 525-01 Encounter: [id number]    Assessment:  79 M with asymptomatic b/l carotid artery stenosis s/p TCAR 4/1     Plan:  · Hopeful d/c today  · Diet as tolerated  · Monitor BP - goals 100-140 due to Intracranial AVM  · D/c stephani  · Pain Control  · DVT PPX   Monitor Neuro exam    Subjective/Objective     Subjective:   No acute events overnight  No chest pain or SOB  Denies numbness, tingling, changes in vision  Pertinent review of systems as above  All other review of systems negative  Objective:    Blood pressure 118/59, pulse (!) 52, temperature 98 6 °F (37 °C), temperature source Oral, resp  rate 18, height 5' 11" (1 803 m), weight 88 5 kg (195 lb), SpO2 98 %  ,Body mass index is 27 2 kg/m²  Intake/Output Summary (Last 24 hours) at 4/2/2021 0616  Last data filed at 4/2/2021 0009  Gross per 24 hour   Intake 2070 ml   Output 825 ml   Net 1245 ml       Invasive Devices     Peripheral Intravenous Line            Peripheral IV 04/01/21 Left Antecubital less than 1 day    Peripheral IV 04/01/21 Left Wrist less than 1 day                Physical Exam:   Gen:  NAD  HEENT: NCAT  MMM, access site cdi, no hematoma  CV: well perfused  Lungs: Normal respiratory effort  Abd: soft, nt/nd  Skin: warm/ dry  Extremities: no peripheral edema, no clubbing or cyanosis  Neuro: AxO x3, CN 2-12 intact                Invalid input(s): LABGLOM         I have personally reviewed pertinent films in PACS      Medications:   Scheduled Meds:  Current Facility-Administered Medications   Medication Dose Route Frequency Provider Last Rate    amLODIPine  5 mg Oral Daily Edmund Ocasio PA-C      aspirin  81 mg Oral Daily Bronson Altes, DO      atorvastatin  80 mg Oral Daily With Alannah Floodheim, DO      clopidogrel  75 mg Oral Daily Bronson Altes, DO      docusate sodium  100 mg Oral BID Bronson Altes, DO      heparin (porcine) 5,000 Units Subcutaneous [facility] Oliver Lanius, DO      Labetalol HCl  5 mg Intravenous Q15 Min PRN Oliver Lanius, DO      And    hydrALAZINE  15 mg Intravenous Q15 Min PRN Oliver Lanius, DO      And    niCARdipine  1-15 mg/hr Intravenous Continuous PRN Oliver Lanius, DO      melatonin  3 mg Oral HS Ping Dallas MD      miconazole   Topical BID Ping Dallas MD      ondansetron  4 mg Intravenous Q6H PRN Oliver Lanius, DO      oxyCODONE  10 mg Oral Q4H PRN Oliver Lanius, DO      oxyCODONE  5 mg Oral Q4H PRN Oliver Lanius, DO      sodium chloride  500 mL Intravenous Once Oliver Lanius, DO      Followed by   Sunitha Trivedi phenylephine   mcg/min Intravenous Titrated Oliver Lanius, DO       Continuous Infusions:niCARdipine, 1-15 mg/hr  phenylephine,  mcg/min      PRN Meds:  Labetalol HCl, 5 mg, Q15 Min PRN    And  hydrALAZINE, 15 mg, Q15 Min PRN    And  niCARdipine, 1-15 mg/hr, Continuous PRN  ondansetron, 4 mg, Q6H PRN  oxyCODONE, 10 mg, Q4H PRN  oxyCODONE, 5 mg, Q4H PRN      VTE Pharmacologic Prophylaxis: Heparin  VTE Mechanical Prophylaxis: sequential compression device

## 2021-04-02 NOTE — PHYSICAL THERAPY NOTE
Physical Therapy Evaluation    Patient's Name: Asim Anthony    Admitting Diagnosis  Asymptomatic bilateral carotid artery stenosis [I65 23]    Problem List  Patient Active Problem List   Diagnosis    Cerebral arteriovenous malformation (AVM)    Dizziness    Asymptomatic bilateral carotid artery stenosis    Atherosclerosis of lower extremity with claudication (HCC)    Vertebral artery occlusion, left    Stage 3a chronic kidney disease    Palpitations    CAD (coronary artery disease)       Past Medical History  Past Medical History:   Diagnosis Date    Bilateral carotid artery stenosis     Cerebral arteriovenous malformation (AVM)     Coronary artery disease     High blood pressure     Hyperlipidemia     Hypertension     PVD (peripheral vascular disease) with claudication     Vertebral artery occlusion, left        Past Surgical History  Past Surgical History:   Procedure Laterality Date    CARDIAC CATHETERIZATION  02/08/2021    Mid LAD eccentric 50-60% stenosis, distal LAD 50% stenosis, apical LAD very small caliber vessel with discrete 90% stenosis after it wraps around apex, proximal ramus 40% stenosis, Co-dominant RCA has proximal aneurysm followed by distal 50% stenosis, RPLA is small to medium caliber vessel will discrete 70% stenosis    If patient remains symptomatic even after medication optimization then considera    COLONOSCOPY      IR CAROTID STENT  4/1/2021    LITHOTRIPSY      GA TCAT IV STENT CRV CRTD ART EMBOLIC PROTECJ Right 2/4/4030    Procedure: ANGIOPLASTY ARTERY CAROTID W/ STENT TCAR [facility];  Surgeon: Kurt Leonardo MD;  [facility]: [facility];  Service: Vascular    SKIN BIOPSY            04/02/21 0905   PT Last Visit   PT Visit Date 04/02/21   Note Type   Note type Evaluation   Pain Assessment   Pain Assessment Tool 0-10   Pain Score 2   Pain Location/Orientation Location: Generalized   Patient's Stated Pain Goal No pain   Hospital Pain Intervention(s) Ambulation/increased activity   Home Living   Type of Home House   Additional Comments Resides w/ wife in 2 story home  Indep self care, ambulates w/ out device  Prior Function   Falls in the last 6 months 0   Restrictions/Precautions   Weight Bearing Precautions Per Order No   Other Precautions Multiple lines;Telemetry;Pain   General   Family/Caregiver Present No   Cognition   Overall Cognitive Status WFL   Arousal/Participation Arousable   Attention Within functional limits   Orientation Level Oriented X4   Memory Unable to assess   Following Commands Follows one step commands without difficulty   RLE Assessment   RLE Assessment   (strength grossly 4/5)   LLE Assessment   LLE Assessment   (strength grossly 4/5)   Bed Mobility   Supine to Sit 4  Minimal assistance   Additional items Assist x 1   Transfers   Sit to Stand 5  Supervision   Additional items Assist x 1   Stand to Sit 5  Supervision   Additional items Assist x 1   Ambulation/Elevation   Gait pattern   (slow, antalgic)   Gait Assistance 5  Supervision   Additional items Assist x 1   Assistive Device None   Distance 100'x2 to and from stairs   Stair Management Assistance 5  Supervision   Additional items Assist x 1   Stair Management Technique One rail R;Nonreciprocal   Number of Stairs 7   Balance   Static Sitting Normal   Dynamic Sitting Good   Static Standing Good   Dynamic Standing Fair +   Ambulatory Fair +   Endurance Deficit   Endurance Deficit No   Activity Tolerance   Activity Tolerance Patient tolerated treatment well   Nurse Made Aware yes   Assessment   Prognosis Good   Assessment Pt seen for high complexity physical therapy evaluation  Pt is a 80 y/o male w/ history/comorbidities of HTN, CKD, CAD, PVD, HLD who is now admitted w/ known carotid stenosis for elective angioplasty and stent R carotid artery  Due to acute medical issues, acute surgery, need for P5 stepdown monitoring, pain, note unstable clinical picture    PT consulted to assess post op mobility, d/c needs  At present time, despite acute medical issues, note pt to be functioning close to or at mobility baseline  No continued skilleda cute care PT needs identified  will sign off    Pt may d/c home when stable   Goals   PT Treatment Day 0   Plan   PT Frequency   (d/c PT)   Recommendation   PT Discharge Recommendation Return to previous environment with social support   PT - OK to Discharge Yes   Johny Galdyserica Bethanie [phone]   Turning in Bed Without Bedrails 4   Lying on Back to Sitting on Edge of Flat Bed 3   Moving Bed to Chair 4   Standing Up From Chair 4   Walk in Room 4   Climb 3-5 Stairs 3   Basic Mobility Inpatient Raw Score 22   Basic Mobility Standardized Score 47 4         Costa Huff PT, DPT, CSRS

## 2021-04-05 ENCOUNTER — TELEPHONE (OUTPATIENT)
Dept: VASCULAR SURGERY | Facility: CLINIC | Age: 71
End: 2021-04-05

## 2021-04-05 ENCOUNTER — TELEPHONE (OUTPATIENT)
Dept: NEPHROLOGY | Facility: CLINIC | Age: 71
End: 2021-04-05

## 2021-04-05 NOTE — TELEPHONE ENCOUNTER
Vascular Nurse Navigator Post Op Call    Procedure: ANGIOPLASTY ARTERY CAROTID W/ STENT TCAR [address] (Right)    Date of Procedure: 4/1/21    Surgeon: Rock Nunez MD - Primary     * Nas Holder DO - Assisting    Discharge Date:  4/2/21    Discharge Disposition: home    Change in Vision?: No    Change in Speech?: No    Weakness?: No    Uncontrolled Pain?: Pt states pain is controlled when he taken the oxycodone  Hoarseness?: No    Trouble Swallowing?: No trouble swallowing  Pt states his throat just feels sore when he eats  Incision Concerns?: No    Anticoagulation?: Clopidogrel (Plavix)    Bleeding?: No      NEXT OFFICE VISIT SCHEDULED: 4/20/21    Vascular:   Transportation Available?: Yes        Any further questions/concerns? Pt asking for a refill of oxycodone  He states he has 1 tablet left and has been taking it q 4 hrs  He said pain is controlled when he takes it and is at a 7/10 without taking it  He has not tried anything OTC for the pain  Pt uses [address] [place] [address]  Informed him I would send a message to our triage provider and we will call him back

## 2021-04-05 NOTE — TELEPHONE ENCOUNTER
Called Patient to set up a hospital follow up  (Looking to schedule with Dr Parminder Bueno, 5/18/21 at 1:00pm - same day slot  Dr Mary Jordan ok'd)

## 2021-04-05 NOTE — TELEPHONE ENCOUNTER
Pt states he is not able to take ibuprofen because the nephrologist told him not to due to his kidney function  I advised him that he can try tylenol  He states he does not have any at his house and he has no way of getting to the pharmacy  Offered to move his OV up but pt declined

## 2021-04-05 NOTE — TELEPHONE ENCOUNTER
Would recommend patient try OTC pain medication first  If he is still experiencing uncontrolled pain with this he should be seen in the office

## 2021-04-05 NOTE — TELEPHONE ENCOUNTER
Patient returned my call  We set up to the date and time mentioned below  Will send patient an appointment card as well  Patient verified address

## 2021-04-20 ENCOUNTER — OFFICE VISIT (OUTPATIENT)
Dept: VASCULAR SURGERY | Facility: CLINIC | Age: 71
End: 2021-04-20

## 2021-04-20 VITALS
BODY MASS INDEX: 27.02 KG/M2 | HEIGHT: 71 IN | WEIGHT: 193 LBS | DIASTOLIC BLOOD PRESSURE: 86 MMHG | SYSTOLIC BLOOD PRESSURE: 142 MMHG | HEART RATE: 65 BPM | TEMPERATURE: 98.5 F

## 2021-04-20 DIAGNOSIS — I65.23 ASYMPTOMATIC BILATERAL CAROTID ARTERY STENOSIS: Primary | ICD-10-CM

## 2021-04-20 PROCEDURE — 99024 POSTOP FOLLOW-UP VISIT: CPT | Performed by: PHYSICIAN ASSISTANT

## 2021-04-20 NOTE — PROGRESS NOTES
Assessment/Plan:      Asymptomatic bilateral carotid artery stenosis  S/P RIGHT TCAR ([facility], 4/1/2021)  -     VAS carotid complete study; Future    - doing well; mild pain on swallowing since surgery which continues to improve  - no new neuro sx; no weakness, visual disturbance, numbness, tongue deviation  - no problems with surgical incision         R TCAR site is well approximated and healing as expected    Plan:  Doing well after RIGHT TCAR  Very mild pain on swallowing, otherwise no complaints  We will plan for routine doppler surveillance and clinical monitoring  He will continue with statin and antiplts  - Resume activities as tolerated  - continue with aspirin lifelong and Plavix for at least 2 months  - heart healthy diet and regular activity  - we reviewed sx of stroke for which he should call 911  - baseline carotid duplex next month  - OV in 2 months or sooner, if any changes        Subjective:      Patient ID: Main Coreas is a 79 y o  male  S/P RIGHT TCAR done by [facility] on 4/01/21  Patient denies TIA/ Stroke symptoms  Incision looks clean and dry  He has no hoarseness, pain, swelling  Patient is on ASA 81mg, clopidegril and atorvastatin  He is a former smoker  HPI   Main Coreas 80 y/o M HTN, CAD, peripheral arterial disease, CKD 3, GERD and left cranial AVM followed by neuro surgery who was also found to have asymptomatic RIGHT carotid artery stenosis  He underwent RIGHT TCAR on 4/1/2021 by Dr Morgan  The hospitalization was uncomplicated and he was able to be discharged as routine  4/20/2021: Patient is post-op day 19 after R TCAR and presents for OV  The patient reports that he is doing very well  Reports that after surgery he had better pain with swelling which is improved but still present  He has very mild tenderness the surgical site  Otherwise he has no complaints  No new numbness or tingling  No weakness  No vision changes  He is tolerating medications   We reviewed postoperative instructions  The following portions of the patient's history were reviewed and updated as appropriate: allergies, current medications, past family history, past medical history, past social history, past surgical history and problem list      Review of Systems   Constitutional: Negative  HENT: Positive for trouble swallowing  Eyes: Negative  Respiratory: Negative  Cardiovascular: Negative  Gastrointestinal: Negative  Endocrine: Negative  Genitourinary: Negative  Musculoskeletal: Negative  Skin: Negative  Allergic/Immunologic: Negative  Neurological: Negative  Hematological: Negative  Psychiatric/Behavioral: Negative  Objective:      /86 (BP Location: Left arm, Patient Position: Sitting, Cuff Size: Standard)   Pulse 65   Temp 98 5 °F (36 9 °C) (Tympanic)   Ht 5' 11" (1 803 m)   Wt 87 5 kg (193 lb)   BMI 26 92 kg/m²             RIGHT TCAR site is secured with surgical glue  Incision is well approximated and healing as expected  There is no bleeding or drainage    Visual fields intact  PERRLE EOMI  Smile intact  Tongue midline  Normal facial movement and sensation  Normal shoulder strength  Good strength bilaterally  Gait steady  Physical Exam  Vitals signs and nursing note reviewed  Constitutional:       Appearance: He is well-developed  HENT:      Head: Normocephalic and atraumatic  Eyes:      Pupils: Pupils are equal, round, and reactive to light  Neck:      Musculoskeletal: Neck supple  Thyroid: No thyromegaly  Vascular: No JVD  Trachea: Trachea normal    Cardiovascular:      Rate and Rhythm: Normal rate and regular rhythm  Pulses:           Carotid pulses are 2+ on the right side and 2+ on the left side  Radial pulses are 2+ on the right side and 2+ on the left side  Heart sounds: Normal heart sounds, S1 normal and S2 normal  No murmur  No friction rub  No gallop      Pulmonary: Effort: Pulmonary effort is normal  No accessory muscle usage or respiratory distress  Breath sounds: Normal breath sounds  No wheezing or rales  Abdominal:      General: Bowel sounds are normal  There is no distension  Palpations: Abdomen is soft  Tenderness: There is no abdominal tenderness  Musculoskeletal: Normal range of motion  General: No deformity  Right lower leg: No edema  Left lower leg: No edema  Skin:     General: Skin is warm and dry  Findings: No lesion or rash  Nails: There is no clubbing  Neurological:      Mental Status: He is alert and oriented to person, place, and time  Comments: Grossly normal    Psychiatric:         Behavior: Behavior is cooperative  I have reviewed and made appropriate changes to the review of systems input by the medical assistant  Vitals:    04/20/21 1018   BP: 142/86   BP Location: Left arm   Patient Position: Sitting   Cuff Size: Standard   Pulse: 65   Temp: 98 5 °F (36 9 °C)   TempSrc: Tympanic   Weight: 87 5 kg (193 lb)   Height: 5' 11" (1 803 m)       Patient Active Problem List   Diagnosis    Cerebral arteriovenous malformation (AVM)    Dizziness    Asymptomatic bilateral carotid artery stenosis    Atherosclerosis of lower extremity with claudication (HCC)    Vertebral artery occlusion, left    Stage 3a chronic kidney disease    Palpitations    CAD (coronary artery disease)       Past Surgical History:   Procedure Laterality Date    CARDIAC CATHETERIZATION  02/08/2021    Mid LAD eccentric 50-60% stenosis, distal LAD 50% stenosis, apical LAD very small caliber vessel with discrete 90% stenosis after it wraps around apex, proximal ramus 40% stenosis, Co-dominant RCA has proximal aneurysm followed by distal 50% stenosis, RPLA is small to medium caliber vessel will discrete 70% stenosis    If patient remains symptomatic even after medication optimization then considera    COLONOSCOPY  IR CAROTID STENT  4/1/2021    LITHOTRIPSY      [place] TCAT IV STENT CRV CRTD ART EMBOLIC PROTECJ Right 6/6/0423    Procedure: ANGIOPLASTY ARTERY CAROTID W/ STENT TCAR [address];  Surgeon: Corbin Shaw MD;  [facility];  Service: Vascular    SKIN BIOPSY         Family History   Problem Relation Age of Onset    Stroke Mother     Heart disease Mother     Stroke Father     Lung cancer Father        Social History     Socioeconomic History    Marital status: /Civil Union     Spouse name: Not on file    Number of children: Not on file    Years of education: Not on file    Highest education level: Not on file   Occupational History    Not on file   Social Needs    Financial resource strain: Not on file    Food insecurity     Worry: Not on file     Inability: Not on file   [employer] needs     Medical: Not on file     Non-medical: Not on file   Tobacco Use    Smoking status: Former Smoker     Packs/day: 0 50     Types: Cigarettes    Smokeless tobacco: Never Used   Substance and Sexual Activity    Alcohol use: Yes     Frequency: 2-3 times a week     Drinks per session: 3 or 4    Drug use: Never    Sexual activity: Not Currently   Lifestyle    Physical activity     Days per week: Not on file     Minutes per session: Not on file    Stress: Not on file   Relationships    Social connections     Talks on phone: Not on file     Gets together: Not on file     Attends Rastafarian service: Not on file     Active member of club or organization: Not on file     Attends meetings of clubs or organizations: Not on file     Relationship status: Not on file    Intimate partner violence     Fear of current or ex partner: Not on file     Emotionally abused: Not on file     Physically abused: Not on file     Forced sexual activity: Not on file   Other Topics Concern    Not on file   Social History Narrative    Not on file       Allergies   Allergen Reactions    Butalbital-Apap-Caffeine Rash Hands swell/redness         Current Outpatient Medications:     amLODIPine (NORVASC) 5 mg tablet, Take 1 tablet (5 mg total) by mouth daily, Disp: , Rfl: 0    aspirin (ECOTRIN LOW STRENGTH) 81 mg EC tablet, Take 81 mg by mouth daily, Disp: , Rfl:     atorvastatin (LIPITOR) 80 mg tablet, Take 1 tablet (80 mg total) by mouth daily, Disp: , Rfl:     clopidogrel (PLAVIX) 75 mg tablet, Take 1 tablet (75 mg total) by mouth daily, Disp: 90 tablet, Rfl: 1    lisinopril (ZESTRIL) 20 mg tablet, Take 1 tablet (20 mg total) by mouth daily, Disp: , Rfl:

## 2021-04-20 NOTE — PATIENT INSTRUCTIONS
S/P RIGHT TCAR                > Resuming basic activities as tolerated  No heavy lifting or work for 2 weeks                > continue with aspirin lifelong and Plavix for at least 2 months   > Continue good heart healthy diet - low fat, low cholesterol     > Regular exercise              > Check post-operative Baseline carotid duplex is due 6 weeks post op              > Stroke education provided              > Follow up in about 2 months with NP/PA       * call to be seen sooner, if any concerns    Symptoms of stroke:  - Unable to speak or understand speech  - Unable to move one side of the body (arm or leg)  - Visual changes  - Call 911 for any symptoms of stroke

## 2021-05-12 ENCOUNTER — TELEPHONE (OUTPATIENT)
Dept: OBGYN CLINIC | Facility: HOSPITAL | Age: 71
End: 2021-05-12

## 2021-05-12 NOTE — TELEPHONE ENCOUNTER
Patient is calling in to make an appointment with Dr Debi Rosas or Dr Ousmane Barnes to be seen for his right elbow he is thinking that this might  Be tennis elbow  The patient has no testing and would be a new patient to our office            Call back# [phone]

## 2021-05-18 ENCOUNTER — OFFICE VISIT (OUTPATIENT)
Dept: NEPHROLOGY | Facility: CLINIC | Age: 71
End: 2021-05-18
Payer: MEDICARE

## 2021-05-18 VITALS
WEIGHT: 190 LBS | TEMPERATURE: 97.3 F | HEART RATE: 68 BPM | BODY MASS INDEX: 26.6 KG/M2 | HEIGHT: 71 IN | DIASTOLIC BLOOD PRESSURE: 80 MMHG | RESPIRATION RATE: 16 BRPM | SYSTOLIC BLOOD PRESSURE: 130 MMHG

## 2021-05-18 DIAGNOSIS — N18.9 CHRONIC KIDNEY DISEASE-MINERAL AND BONE DISORDER: ICD-10-CM

## 2021-05-18 DIAGNOSIS — E83.9 CHRONIC KIDNEY DISEASE-MINERAL AND BONE DISORDER: ICD-10-CM

## 2021-05-18 DIAGNOSIS — I65.23 ASYMPTOMATIC BILATERAL CAROTID ARTERY STENOSIS: ICD-10-CM

## 2021-05-18 DIAGNOSIS — N18.31 STAGE 3A CHRONIC KIDNEY DISEASE (HCC): Primary | ICD-10-CM

## 2021-05-18 DIAGNOSIS — M89.9 CHRONIC KIDNEY DISEASE-MINERAL AND BONE DISORDER: ICD-10-CM

## 2021-05-18 DIAGNOSIS — I10 ESSENTIAL HYPERTENSION: ICD-10-CM

## 2021-05-18 DIAGNOSIS — I70.219 ATHEROSCLEROSIS OF LOWER EXTREMITY WITH CLAUDICATION (HCC): ICD-10-CM

## 2021-05-18 PROCEDURE — 99214 OFFICE O/P EST MOD 30 MIN: CPT | Performed by: INTERNAL MEDICINE

## 2021-05-18 NOTE — ASSESSMENT & PLAN NOTE
Patient is being monitored by vascular surgeon  Has stenting done    At present seems to be asymptomatic

## 2021-05-18 NOTE — ASSESSMENT & PLAN NOTE
Lab Results   Component Value Date    EGFR 60 04/02/2021    EGFR 48 03/24/2021    EGFR 41 02/08/2021    CREATININE 1 21 04/02/2021    CREATININE 1 45 (H) 03/24/2021    CREATININE 1 66 (H) 02/08/2021    patient PTH and phosphorus along with vitamin-D  Will monitor as part of the CKD management

## 2021-05-18 NOTE — PATIENT INSTRUCTIONS

## 2021-05-18 NOTE — ASSESSMENT & PLAN NOTE
Status post surgery and tolerated procedure very well    Kidney function remained stable with hydration

## 2021-05-18 NOTE — PROGRESS NOTES
NEPHROLOGY OFFICE FOLLOW UP  Kermit Apgar 79 y o  male MRN: [id number]    Encounter: [id number] 2021    REASON FOR VISIT: Kermit Apgar is a 79 y o  male who is here on 2021 for Chronic Kidney Disease and Follow-up    HPI:    Aubree Rios came in today for follow-up of CKD after hospital discharge  [de-identified] old gentleman who was admitted to [facility], [place], operated by [facility] for carotid artery surgery  He was found to have CKD and was seen by Dr Kelin Stock  Over there  Patient is doing quite well  Does have CKD for last 2-3 years  Does have long history of hypertension which is well control    Also peripheral vascular disease requiring multiple surgery and stenting being monitored by vascular surgeon     Denies any coronary artery disease but had cardiac catheterization done in the past    Does have chronic back pain denies taking nonsteroidal pain killer      REVIEW OF SYSTEMS:    Review of Systems   Constitutional: Negative for activity change and fatigue  HENT: Negative for congestion and ear discharge  Eyes: Negative for photophobia and pain  Respiratory: Negative for apnea and choking  Cardiovascular: Negative for chest pain and palpitations  Gastrointestinal: Negative for abdominal distention and blood in stool  Endocrine: Negative for heat intolerance and polyphagia  Genitourinary: Negative for flank pain and urgency  Musculoskeletal: Positive for back pain  Negative for neck pain and neck stiffness  Skin: Negative for color change and wound  Allergic/Immunologic: Negative for food allergies and immunocompromised state  Neurological: Negative for seizures and facial asymmetry  Hematological: Negative for adenopathy  Does not bruise/bleed easily  Psychiatric/Behavioral: Negative for self-injury and suicidal ideas           PAST MEDICAL HISTORY:  Past Medical History:   Diagnosis Date    Bilateral carotid artery stenosis     Cerebral arteriovenous malformation (AVM)     Chronic kidney disease     Coronary artery disease     High blood pressure     Hyperlipidemia     Hypertension     PVD (peripheral vascular disease) with claudication     Vertebral artery occlusion, left        PAST SURGICAL HISTORY:  Past Surgical History:   Procedure Laterality Date    CARDIAC CATHETERIZATION  02/08/2021    Mid LAD eccentric 50-60% stenosis, distal LAD 50% stenosis, apical LAD very small caliber vessel with discrete 90% stenosis after it wraps around apex, proximal ramus 40% stenosis, Co-dominant RCA has proximal aneurysm followed by distal 50% stenosis, RPLA is small to medium caliber vessel will discrete 70% stenosis    If patient remains symptomatic even after medication optimization then considera    COLONOSCOPY      IR CAROTID STENT  4/1/2021    LITHOTRIPSY      CA TCAT IV STENT CRV CRTD ART EMBOLIC PROTECJ Right 0/0/2877    Procedure: ANGIOPLASTY ARTERY CAROTID W/ STENT TCAR [address];  Surgeon: Bear Mix MD;  Location: [facility];  Service: Vascular    SKIN BIOPSY         SOCIAL HISTORY:  Social History     Substance and Sexual Activity   Alcohol Use Yes    Frequency: 2-3 times a week    Drinks per session: 3 or 4     Social History     Substance and Sexual Activity   Drug Use Never     Social History     Tobacco Use   Smoking Status Former Smoker    Packs/day: 0 50    Types: Cigarettes   Smokeless Tobacco Never Used       FAMILY HISTORY:  Family History   Problem Relation Age of Onset    Stroke Mother     Heart disease Mother     Stroke Father     Lung cancer Father        MEDICATIONS:    Current Outpatient Medications:     amLODIPine (NORVASC) 5 mg tablet, Take 1 tablet (5 mg total) by mouth daily, Disp: , Rfl: 0    aspirin (ECOTRIN LOW STRENGTH) 81 mg EC tablet, Take 81 mg by mouth daily, Disp: , Rfl:     atorvastatin (LIPITOR) 80 mg tablet, Take 1 tablet (80 mg total) by mouth daily, Disp: , Rfl:     clopidogrel (PLAVIX) 75 mg tablet, Take 1 tablet (75 mg total) by mouth daily, Disp: 90 tablet, Rfl: 1    lisinopril (ZESTRIL) 20 mg tablet, Take 1 tablet (20 mg total) by mouth daily, Disp: , Rfl:     PHYSICAL EXAM:  Vitals:    05/18/21 1257   BP: 130/80   BP Location: Right arm   Patient Position: Sitting   Pulse: 68   Resp: 16   Temp: (!) 97 3 °F (36 3 °C)   TempSrc: Temporal   Weight: 86 2 kg (190 lb)   Height: 5' 10 5" (1 791 m)     Body mass index is 26 88 kg/m²  Physical Exam  Constitutional:       General: He is not in acute distress  Appearance: He is well-developed  HENT:      Head: Normocephalic  Eyes:      General: No scleral icterus  Conjunctiva/sclera: Conjunctivae normal    Neck:      Musculoskeletal: Normal range of motion and neck supple  Vascular: No JVD  Cardiovascular:      Rate and Rhythm: Normal rate  Heart sounds: Normal heart sounds  Pulmonary:      Effort: Pulmonary effort is normal       Breath sounds: Normal breath sounds  No wheezing  Abdominal:      General: Bowel sounds are normal       Palpations: Abdomen is soft  Tenderness: There is no abdominal tenderness  Musculoskeletal: Normal range of motion  General: No swelling  Skin:     General: Skin is warm  Findings: No rash  Neurological:      Mental Status: He is alert and oriented to person, place, and time     Psychiatric:         Behavior: Behavior normal          LAB RESULTS:  Results for orders placed or performed during the hospital encounter of 89/08/38   Basic Metabolic Panel   Result Value Ref Range    Sodium 139 136 - 145 mmol/L    Potassium 3 8 3 5 - 5 3 mmol/L    Chloride 108 100 - 108 mmol/L    CO2 25 21 - 32 mmol/L    ANION GAP 6 4 - 13 mmol/L    BUN 15 5 - 25 mg/dL    Creatinine 1 21 0 60 - 1 30 mg/dL    Glucose 95 65 - 140 mg/dL    Calcium 8 7 8 3 - 10 1 mg/dL    eGFR 60 ml/min/1 73sq m   CBC and Platelet   Result Value Ref Range    WBC 7 77 4 31 - 10 16 Thousand/uL    RBC 4 33 3 88 - 5 62 Million/uL    Hemoglobin 13 4 12 0 - 17 0 g/dL Hematocrit 41 5 36 5 - 49 3 %    MCV 96 82 - 98 fL    MCH 30 9 26 8 - 34 3 pg    MCHC 32 3 31 4 - 37 4 g/dL    RDW 12 1 11 6 - 15 1 %    Platelets 477 060 - 640 Thousands/uL    MPV 11 2 8 9 - 12 7 fL   APTT   Result Value Ref Range    PTT 34 23 - 37 seconds   Protime-INR   Result Value Ref Range    Protime 14 3 11 6 - 14 5 seconds    INR 1 11 0 84 - 1 19   ABORh Recheck - Contact Blood Bank Prior to Collection   Result Value Ref Range    ABO Grouping O     Rh Factor Positive    POCT activated clotting time   Result Value Ref Range    Activated Clotting Time, i-STAT 115 89 - 137 sec    Specimen Type ARTERIAL    POCT activated clotting time   Result Value Ref Range    Activated Clotting Time, i-STAT 243 (H) 89 - 137 sec    Specimen Type ARTERIAL    Fingerstick Glucose (POCT)   Result Value Ref Range    POC Glucose 96 65 - 140 mg/dl       ASSESSMENT and PLAN:      Stage 3a chronic kidney disease (HCC)  Lab Results   Component Value Date    EGFR 60 04/02/2021    EGFR 48 03/24/2021    EGFR 41 02/08/2021    CREATININE 1 21 04/02/2021    CREATININE 1 45 (H) 03/24/2021    CREATININE 1 66 (H) 02/08/2021    patient seems to have stage III CKD  Possibly hypertensive nephrosclerosis  Kidney function is better at this point because of hydration which he seemed in hospital    Pathophysiology kidney disease discussed with him and his wife  Importance of hydration and avoiding nephrotoxic medicine also discussed with him     asymptomatic and progressive nature of kidney disease also discussed with him    I will repeat the blood test urine test   Will also get kidney ultrasound as part of the CKD workup    Chronic kidney disease-mineral and bone disorder  Lab Results   Component Value Date    EGFR 60 04/02/2021    EGFR 48 03/24/2021    EGFR 41 02/08/2021    CREATININE 1 21 04/02/2021    CREATININE 1 45 (H) 03/24/2021    CREATININE 1 66 (H) 02/08/2021    patient PTH and phosphorus along with vitamin-D  Will monitor as part of the CKD management    Asymptomatic bilateral carotid artery stenosis   Status post surgery and tolerated procedure very well  Kidney function remained stable with hydration    Atherosclerosis of lower extremity with claudication [facility])   Patient is being monitored by vascular surgeon  Has stenting done  At present seems to be asymptomatic    Essential hypertension   Who very well control with medication which include ACE-inhibitor       everything discussed with patient and his wife at length  I will see him back in 3 months  In between he get blood test and urine test and kidney ultrasound  Portions of the record may have been created with voice recognition software  Occasional wrong word or "sound a like" substitutions may have occurred due to the inherent limitations of voice recognition software  Read the chart carefully and recognize, using context, where substitutions have occurred  If you have any questions, please contact the dictating provider

## 2021-05-18 NOTE — ASSESSMENT & PLAN NOTE
Lab Results   Component Value Date    EGFR 60 04/02/2021    EGFR 48 03/24/2021    EGFR 41 02/08/2021    CREATININE 1 21 04/02/2021    CREATININE 1 45 (H) 03/24/2021    CREATININE 1 66 (H) 02/08/2021    patient seems to have stage III CKD  Possibly hypertensive nephrosclerosis  Kidney function is better at this point because of hydration which he seemed in hospital    Pathophysiology kidney disease discussed with him and his wife  Importance of hydration and avoiding nephrotoxic medicine also discussed with him     asymptomatic and progressive nature of kidney disease also discussed with him    I will repeat the blood test urine test   Will also get kidney ultrasound as part of the CKD workup

## 2021-05-19 ENCOUNTER — OFFICE VISIT (OUTPATIENT)
Dept: OBGYN CLINIC | Facility: CLINIC | Age: 71
End: 2021-05-19
Payer: MEDICARE

## 2021-05-19 VITALS
DIASTOLIC BLOOD PRESSURE: 74 MMHG | HEART RATE: 63 BPM | WEIGHT: 198.8 LBS | HEIGHT: 71 IN | BODY MASS INDEX: 27.83 KG/M2 | SYSTOLIC BLOOD PRESSURE: 119 MMHG

## 2021-05-19 DIAGNOSIS — S39.92XA LOWER BACK INJURY, INITIAL ENCOUNTER: Primary | ICD-10-CM

## 2021-05-19 DIAGNOSIS — M62.830 LUMBAR PARASPINAL MUSCLE SPASM: ICD-10-CM

## 2021-05-19 PROCEDURE — 99204 OFFICE O/P NEW MOD 45 MIN: CPT | Performed by: FAMILY MEDICINE

## 2021-05-19 RX ORDER — TIZANIDINE 4 MG/1
4 TABLET ORAL 2 TIMES DAILY PRN
Qty: 60 TABLET | Refills: 1 | Status: SHIPPED | OUTPATIENT
Start: 2021-05-19 | End: 2022-06-02 | Stop reason: SDUPTHER

## 2021-05-19 NOTE — PROGRESS NOTES
Assessment/Plan:  Assessment/Plan   Diagnoses and all orders for this visit:    Lower back injury, initial encounter  -     MRI lumbar spine wo contrast; Future    Lumbar paraspinal muscle spasm  -     tiZANidine (ZANAFLEX) 4 mg tablet; Take 1 tablet (4 mg total) by mouth 2 (two) times a day as needed for muscle spasms        75-year-old male with mid and low back pain of more than 20 years duration  Discussed with patient physical exam, impression and plan  Physical exam noted for midline tenderness L1 through S1 and right lumbar paraspinal tenderness and hypertonicity  He has limited range of motion with rotating and side bending to both sides and with extension  He has atrophy in the quadriceps of both thighs and weakness both lower extremities hip flexion and knee extension  He has hamstring tightness both lower extremities  There is no groin pain with YENI and FADDIR maneuvers of the hips  Clinical impression that he is symptomatic from low back pathology  Since symptoms persisted despite having had injection in the past and formal physical therapy, I recommend updated imaging studies as repeat course invasive management warranted  I will refer him for MRI of lumbar spine to evaluate for stenosis, disc pathology, and nerve impingement  In the interim he is to take tumeric 500 mg daily  He may apply topical CBD  May take tizanidine 4 mg up to 2 times a day as needed but not before driving  He may benefit from electrical stimulation so I will refer him for home electrical stimulation unit with [facility] to help with support and function  He will follow up with me after getting MRI done  Subjective:   Patient ID: Pratibha Parks is a 79 y o  male  Chief Complaint   Patient presents with    Middle Back - Pain       75-year-old male presents for evaluation of medial low back pain of more than 20 years duration  He reports having had injury many years ago    He was treated in the past with formal physical therapy and injections to the spine  He last completed course of physical therapy more than 2 years ago  Since that time he has been experiencing pain described as localized to the thoracolumbar spine, radiating to the right buttock, achy and sore and sometimes sharp, worse with twisting, worse with physical activity, and improved with rest   He denies numbness or tingling or weakness in the lower extremities  He denies bowel or bladder incontinence  He has been managing symptoms on his own with over-the-counter medications and with heat  He is on oral anticoagulation and was advised against taking NSAIDs  He has been taking Tylenol  Back Pain  This is a chronic problem  The current episode started more than 1 year ago  The problem occurs daily  The problem has been gradually worsening  Associated symptoms include arthralgias  Pertinent negatives include no abdominal pain, chest pain, chills, fever, joint swelling, numbness, rash, sore throat or weakness  The symptoms are aggravated by twisting and bending  He has tried rest, heat and acetaminophen for the symptoms  The treatment provided mild relief  The following portions of the patient's history were reviewed and updated as appropriate: He  has a past medical history of Bilateral carotid artery stenosis, Cerebral arteriovenous malformation (AVM), Chronic kidney disease, Coronary artery disease, High blood pressure, Hyperlipidemia, Hypertension, PVD (peripheral vascular disease) with claudication, and Vertebral artery occlusion, left  He  has a past surgical history that includes Lithotripsy; Cardiac catheterization (02/08/2021); Colonoscopy; Skin biopsy; IR carotid stent (4/1/2021); and pr tcat iv stent crv crtd art embolic protecj (Right, 6/9/8342)  His family history includes Heart disease in his mother; Lung cancer in his father; Stroke in his father and mother  He  reports that he has quit smoking   His smoking use included cigarettes  He smoked 0 50 packs per day  He has never used smokeless tobacco  He reports current alcohol use  He reports that he does not use drugs  He is allergic to butalbital-apap-caffeine       Review of Systems   Constitutional: Negative for chills and fever  HENT: Negative for sore throat  Eyes: Negative for visual disturbance  Respiratory: Negative for shortness of breath  Cardiovascular: Negative for chest pain  Gastrointestinal: Negative for abdominal pain  Genitourinary: Negative for flank pain  Musculoskeletal: Positive for arthralgias and back pain  Negative for joint swelling  Skin: Negative for rash and wound  Neurological: Negative for weakness and numbness  Hematological: Does not bruise/bleed easily  Psychiatric/Behavioral: Negative for self-injury  Objective:  Vitals:    05/19/21 0852   BP: 119/74   Pulse: 63   Weight: 90 2 kg (198 lb 12 8 oz)   Height: 5' 10 5" (1 791 m)     Right Ankle Exam     Muscle Strength   Dorsiflexion:  5/5  Plantar flexion:  5/5      Left Ankle Exam     Muscle Strength   Dorsiflexion:  5/5   Plantar flexion:  5/5       Right Knee Exam     Comments:  Quadriceps atrophy      Left Knee Exam     Comments:  Quadriceps atrophy      Right Hip Exam     Muscle Strength   Abduction: 4/5   Flexion: 5/5     Tests   YENI: negative    Comments:  Negative FADDIR  Hamstring tightness      Left Hip Exam     Muscle Strength   Abduction: 4/5   Flexion: 5/5     Tests   YENI: negative    Comments:  Negative FADDIR  Hamstring tightness      Back Exam     Tenderness   The patient is experiencing tenderness in the lumbar (Midline tenderness L1-S1, right lumbar paraspinal tenderness and hypertonicity)      Range of Motion   Extension: abnormal   Flexion: normal   Lateral bend right: abnormal   Lateral bend left: abnormal   Rotation right: abnormal   Rotation left: abnormal     Muscle Strength   Right Quadriceps:  4/5   Left Quadriceps:  4/5     Tests   Straight leg raise right: negative  Straight leg raise left: negative    Reflexes   Patellar: normal    Other   Sensation: normal  Gait: normal           Strength/Myotome Testing     Left Ankle/Foot   Dorsiflexion: 5  Plantar flexion: 5    Right Ankle/Foot   Dorsiflexion: 5  Plantar flexion: 5      Physical Exam  Vitals signs and nursing note reviewed  Constitutional:       General: He is not in acute distress  Appearance: He is well-developed  HENT:      Head: Normocephalic and atraumatic  Right Ear: External ear normal       Left Ear: External ear normal    Eyes:      Conjunctiva/sclera: Conjunctivae normal    Neck:      Trachea: No tracheal deviation  Cardiovascular:      Rate and Rhythm: Normal rate  Pulmonary:      Effort: Pulmonary effort is normal  No respiratory distress  Abdominal:      General: There is no distension  Musculoskeletal:         General: Tenderness present  Skin:     General: Skin is warm and dry  Neurological:      Mental Status: He is alert and oriented to person, place, and time     Psychiatric:         Behavior: Behavior normal

## 2021-06-03 ENCOUNTER — TELEPHONE (OUTPATIENT)
Dept: OBGYN CLINIC | Facility: CLINIC | Age: 71
End: 2021-06-03

## 2021-06-03 ENCOUNTER — TELEPHONE (OUTPATIENT)
Dept: VASCULAR SURGERY | Facility: CLINIC | Age: 71
End: 2021-06-03

## 2021-06-03 NOTE — TELEPHONE ENCOUNTER
Called patient to reschedule - left message  From: [email or web address]   Sent: Thursday, Ludy 3, 2021 1:09 PM  To: Brisa Trotter <[email or web address]  Subject: no show    Anne Garces! I have another no show  PT Nicole Salinas  50   He has follow up with Kari next week  Thanks,   [employer]

## 2021-06-10 ENCOUNTER — TELEPHONE (OUTPATIENT)
Dept: NEUROLOGY | Facility: CLINIC | Age: 71
End: 2021-06-10

## 2021-06-10 NOTE — TELEPHONE ENCOUNTER
Spoke with patient to remind of appt with Dr Gretchen Frances on 6/14/21 at 11:30 am  Pt Ok to keep this appt

## 2021-06-14 ENCOUNTER — TELEPHONE (OUTPATIENT)
Dept: NEUROLOGY | Facility: CLINIC | Age: 71
End: 2021-06-14

## 2021-06-14 ENCOUNTER — OFFICE VISIT (OUTPATIENT)
Dept: NEUROLOGY | Facility: CLINIC | Age: 71
End: 2021-06-14
Payer: MEDICARE

## 2021-06-14 VITALS
DIASTOLIC BLOOD PRESSURE: 86 MMHG | HEART RATE: 58 BPM | SYSTOLIC BLOOD PRESSURE: 138 MMHG | BODY MASS INDEX: 26.18 KG/M2 | WEIGHT: 187 LBS | HEIGHT: 71 IN

## 2021-06-14 DIAGNOSIS — Q28.2 CEREBRAL ARTERIOVENOUS MALFORMATION (AVM): ICD-10-CM

## 2021-06-14 DIAGNOSIS — R42 DIZZINESS: Primary | ICD-10-CM

## 2021-06-14 DIAGNOSIS — I10 ESSENTIAL HYPERTENSION: ICD-10-CM

## 2021-06-14 DIAGNOSIS — I65.02 VERTEBRAL ARTERY OCCLUSION, LEFT: ICD-10-CM

## 2021-06-14 PROCEDURE — 99214 OFFICE O/P EST MOD 30 MIN: CPT | Performed by: PSYCHIATRY & NEUROLOGY

## 2021-06-14 NOTE — PROGRESS NOTES
Asim Anthony is a 79 y o  male  Chief Complaint   Patient presents with    Vertebral artery occlusion, left    Right carotid artery occlusion    Cerebral arteriovenous malformation (AVM)    Carotid stenosis, bilateral       Assessment:  1  Dizziness    2  Cerebral arteriovenous malformation (AVM)    3  Vertebral artery occlusion, left    4  Essential hypertension        Plan:   continue with aspirin  follow-up with Neurosurgery and vascular surgery  follow-up in 6 months  Discussion:   patient is status post right carotid  surgery and is in follow up with vascular surgery, he also has a left frontal lobe AV m and vertebral artery occlusion, stroke education given to the patient, he was advised to keep his blood pressure cholesterol and sugar under control, to go to the hospital if has any worsening symptoms and call me otherwise to see me back in 6 months and follow up with the other physicians  Subjective:    HPI    patient is here in follow-up for his AVM and vertebral artery stenosis and dizziness, since his last visit he had the carotid surgery in April, she currently denies having any headaches no dizziness he is in follow up with the neurosurgeon regarding his left frontal AVM, no seizures no confusion appetite is good weight has been stable, no motor or sensory symptoms in upper or lower extremity, no other complaints      Vitals:    06/14/21 1128   BP: 138/86   BP Location: Left arm   Patient Position: Sitting   Cuff Size: Adult   Pulse: 58   Weight: 84 8 kg (187 lb)   Height: 5' 10 5" (1 791 m)       Current Medications    Current Outpatient Medications:     amLODIPine (NORVASC) 5 mg tablet, Take 1 tablet (5 mg total) by mouth daily, Disp: , Rfl: 0    aspirin (ECOTRIN LOW STRENGTH) 81 mg EC tablet, Take 81 mg by mouth daily, Disp: , Rfl:     atorvastatin (LIPITOR) 80 mg tablet, Take 1 tablet (80 mg total) by mouth daily, Disp: , Rfl:     clopidogrel (PLAVIX) 75 mg tablet, Take 1 tablet (75 mg total) by mouth daily, Disp: 90 tablet, Rfl: 1    lisinopril (ZESTRIL) 20 mg tablet, Take 1 tablet (20 mg total) by mouth daily, Disp: , Rfl:     tiZANidine (ZANAFLEX) 4 mg tablet, Take 1 tablet (4 mg total) by mouth 2 (two) times a day as needed for muscle spasms, Disp: 60 tablet, Rfl: 1      Allergies  Butalbital-apap-caffeine    Past Medical History  Past Medical History:   Diagnosis Date    Bilateral carotid artery stenosis     Cerebral arteriovenous malformation (AVM)     Chronic kidney disease     Coronary artery disease     High blood pressure     Hyperlipidemia     Hypertension     PVD (peripheral vascular disease) with claudication     Vertebral artery occlusion, left          Past Surgical History:  Past Surgical History:   Procedure Laterality Date    CARDIAC CATHETERIZATION  02/08/2021    Mid LAD eccentric 50-60% stenosis, distal LAD 50% stenosis, apical LAD very small caliber vessel with discrete 90% stenosis after it wraps around apex, proximal ramus 40% stenosis, Co-dominant RCA has proximal aneurysm followed by distal 50% stenosis, RPLA is small to medium caliber vessel will discrete 70% stenosis    If patient remains symptomatic even after medication optimization then considera    COLONOSCOPY      IR CAROTID STENT  4/1/2021    LITHOTRIPSY      TX TCAT IV STENT CRV CRTD ART EMBOLIC PROTECJ Right 8/0/2898    Procedure: ANGIOPLASTY ARTERY CAROTID W/ STENT TCAR [address];  Surgeon: Lurena Alpers, MD;  Location: [facility];  Service: Vascular    SKIN BIOPSY           Family History:  Family History   Problem Relation Age of Onset    Stroke Mother     Heart disease Mother     Stroke Father     Lung cancer Father     No Known Problems Brother     No Known Problems Maternal Grandmother     No Known Problems Maternal Grandfather     No Known Problems Paternal Grandmother     No Known Problems Paternal Grandfather     No Known Problems Daughter     No Known Problems Son    [facility] No Known Problems Son        Social History:   reports that he has been smoking cigarettes  He has been smoking about 6 00 packs per day  He has never used smokeless tobacco  He reports current alcohol use of about 1 0 standard drinks of alcohol per week  He reports that he does not use drugs  I have reviewed the past medical history, surgical history, social and family history, current medications, allergies vitals, review of systems, and updated this information as appropriate today  Objective:    Physical Exam    Neurological Exam    GENERAL:  Cooperative in no acute distress  Well-developed and well-nourished    HEAD and NECK   Head is atraumatic normocephalic with no lesions or masses  Neck is supple with full range of motion    CARDIOVASCULAR  Carotid Arteries-no carotid bruits  NEUROLOGIC:  Mental Status-the patient is awake alert and oriented without aphasia or apraxia  Cranial Nerves: Visual fields are full to confrontation  Extraocular movements are full without nystagmus  Pupils are 2-1/2 mm and reactive  Face is symmetrical to light touch  Movements of facial expression move symmetrically  Hearing is normal to finger rub bilaterally  Soft palate lifts symmetrically  Shoulder shrug is symmetrical  Tongue is midline without atrophy  Motor: No drift is noted on arm extension  Strength is full in the upper and lower extremities with normal bulk and tone  Sensory: Intact to temperature and vibratory sensation in the upper and lower extremities bilaterally  Cortical function is intact  Coordination: Finger to nose testing is performed accurately  Gait reveals a normal base with symmetrical arm swing  ROS:  Review of Systems   Constitutional: Negative for appetite change, fatigue and fever  HENT: Negative for drooling, ear pain, tinnitus, trouble swallowing and voice change  Eyes: Negative for photophobia, pain and visual disturbance     Respiratory: Negative for chest tightness and shortness of breath  Cardiovascular: Negative for chest pain, palpitations and leg swelling  Gastrointestinal: Positive for diarrhea  Negative for abdominal pain, constipation, nausea and vomiting  Endocrine: Negative for cold intolerance and heat intolerance  Genitourinary: Negative for difficulty urinating, frequency and urgency  Musculoskeletal: Positive for back pain (Low back)  Negative for gait problem, joint swelling, myalgias and neck pain  Skin: Negative for rash  Neurological: Negative for dizziness, tremors, seizures, syncope, facial asymmetry, speech difficulty, weakness, light-headedness, numbness and headaches  Psychiatric/Behavioral: Positive for dysphoric mood and sleep disturbance  Negative for agitation, behavioral problems, confusion and decreased concentration  The patient is not nervous/anxious and is not hyperactive

## 2021-06-14 NOTE — TELEPHONE ENCOUNTER
FYI: Pt called in asking if he should still come in since he did not have his vascular study done  Does not look like Dr Vickey Jim was the one who ordered test  Spoke with Zulay Whitaker MA who also double checked orders  Made pt aware he can still attend today's appt

## 2021-07-06 ENCOUNTER — HOSPITAL ENCOUNTER (OUTPATIENT)
Dept: RADIOLOGY | Facility: MEDICAL CENTER | Age: 71
Discharge: HOME | End: 2021-07-06
Payer: MEDICARE

## 2021-07-06 DIAGNOSIS — N18.31 STAGE 3A CHRONIC KIDNEY DISEASE (HCC): ICD-10-CM

## 2021-07-06 PROCEDURE — 76770 US EXAM ABDO BACK WALL COMP: CPT

## 2021-07-07 ENCOUNTER — OFFICE VISIT (OUTPATIENT)
Dept: OBGYN CLINIC | Facility: CLINIC | Age: 71
End: 2021-07-07
Payer: MEDICARE

## 2021-07-07 VITALS
WEIGHT: 182 LBS | HEIGHT: 71 IN | BODY MASS INDEX: 25.48 KG/M2 | RESPIRATION RATE: 20 BRPM | DIASTOLIC BLOOD PRESSURE: 95 MMHG | SYSTOLIC BLOOD PRESSURE: 143 MMHG | HEART RATE: 62 BPM

## 2021-07-07 DIAGNOSIS — M51.26 LUMBAR HERNIATED DISC: ICD-10-CM

## 2021-07-07 DIAGNOSIS — M47.816 LUMBAR FACET ARTHROPATHY: ICD-10-CM

## 2021-07-07 DIAGNOSIS — R19.8 ABDOMINAL WEAKNESS: ICD-10-CM

## 2021-07-07 DIAGNOSIS — M48.061 FORAMINAL STENOSIS OF LUMBAR REGION: ICD-10-CM

## 2021-07-07 DIAGNOSIS — M48.00 CENTRAL STENOSIS OF SPINAL CANAL: Primary | ICD-10-CM

## 2021-07-07 DIAGNOSIS — S39.012D LUMBAR STRAIN, SUBSEQUENT ENCOUNTER: ICD-10-CM

## 2021-07-07 PROCEDURE — 99214 OFFICE O/P EST MOD 30 MIN: CPT | Performed by: FAMILY MEDICINE

## 2021-07-07 NOTE — PROGRESS NOTES
Assessment/Plan:  Assessment/Plan   Diagnoses and all orders for this visit:    Central stenosis of spinal canal  -     Ambulatory referral to Physical Therapy; Future  -     Ambulatory referral to Pain Management; Future    Lumbar herniated disc  -     Ambulatory referral to Physical Therapy; Future  -     Ambulatory referral to Pain Management; Future    Foraminal stenosis of lumbar region  -     Ambulatory referral to Physical Therapy; Future  -     Ambulatory referral to Pain Management; Future    Lumbar facet arthropathy  -     Ambulatory referral to Physical Therapy; Future  -     Ambulatory referral to Pain Management; Future    Lumbar strain, subsequent encounter  -     Ambulatory referral to Physical Therapy; Future    Abdominal weakness  -     Ambulatory referral to Physical Therapy; Future        60-year-old male with mid and low back pain of more than 20 years duration  Discussed with patient MRI results, impression and plan  MRI of lumbar spine is noted for L5-S1 annular tear with central and right paracentral herniated disc, moderate to severe right and mild to moderate left foraminal stenosis, moderate canal stenosis, bilateral facet hypertrophy, L4-5 broad-based herniated disc, moderate to severe bilateral foraminal stenosis and severe central stenosis, bilateral hypertrophic facet disease, L3-L4 bulging and mild to moderate bilateral foraminal stenosis and moderate central stenosis and bilateral hypertrophic facet disease  Clinical impression is that he is symptomatic from lumbar spine pathology  I advised patient that since it has been more than few years since he last completed physical therapy I recommend repeating course of physical therapy with aqua therapy  I will also have him consult with pain management as more invasive management may be warranted  He is to proceed with electrical stimulation unit 30 minutes twice daily  He will follow up with me as needed    Patient was advised that if he experiences anal or genital numbness or bowel or bladder incontinence to proceed to emergency room  Subjective:   Patient ID: Shantal Bee is a 79 y o  male  Chief Complaint   Patient presents with    Lower Back - Follow-up, Pain       80-year-old male following up for mid and low back pain of more than 20 years duration  He was last seen by me 6 weeks ago at which point he was referred for MRI of the lumbar spine and was referred for electrical stimulation unit  He was advised on taking tumeric, use of topical CBD, and to take tizanidine  He reports mild improvement in his symptoms since his last visit  He continues to be physically active despite having symptoms  He has pain described as localized to the thoracolumbar spine, radiating to the right buttock, achy and sore and sometimes sharp, worse with twisting, worse with physical activity, and improved with resting  He denies numbness in lower extremities, but sometimes has pain in the lower extremities  Back Pain  This is a chronic problem  The current episode started more than 1 year ago  The problem occurs daily  The problem has been waxing and waning  Pertinent negatives include no arthralgias, joint swelling, numbness or weakness  The symptoms are aggravated by standing, twisting, walking and bending  He has tried rest and position changes (Muscle relaxer) for the symptoms  The treatment provided mild relief  Review of Systems   Musculoskeletal: Positive for back pain  Negative for arthralgias and joint swelling  Neurological: Negative for weakness and numbness  Objective:  Vitals:    07/07/21 1015   BP: 143/95   Pulse: 62   Resp: 20   Weight: 82 6 kg (182 lb)   Height: 5' 10 5" (1 791 m)     Ortho Exam    Physical Exam  Vitals and nursing note reviewed  Constitutional:       General: He is not in acute distress  Appearance: He is well-developed  He is not ill-appearing or diaphoretic     HENT:      Head: Normocephalic and atraumatic  Right Ear: External ear normal       Left Ear: External ear normal    Eyes:      Conjunctiva/sclera: Conjunctivae normal    Neck:      Trachea: No tracheal deviation  Cardiovascular:      Rate and Rhythm: Normal rate  Pulmonary:      Effort: Pulmonary effort is normal  No respiratory distress  Abdominal:      General: There is no distension  Musculoskeletal:         General: No swelling, deformity or signs of injury  Right lower leg: No edema  Left lower leg: No edema  Skin:     General: Skin is warm and dry  Coloration: Skin is not jaundiced or pale  Neurological:      Mental Status: He is alert and oriented to person, place, and time  Psychiatric:         Mood and Affect: Mood normal          Behavior: Behavior normal          Thought Content:  Thought content normal          Judgment: Judgment normal

## 2021-07-28 ENCOUNTER — EVALUATION (OUTPATIENT)
Dept: PHYSICAL THERAPY | Age: 71
End: 2021-07-28
Payer: MEDICARE

## 2021-07-28 DIAGNOSIS — M54.40 CHRONIC LOW BACK PAIN WITH SCIATICA, SCIATICA LATERALITY UNSPECIFIED, UNSPECIFIED BACK PAIN LATERALITY: Primary | ICD-10-CM

## 2021-07-28 DIAGNOSIS — G89.29 CHRONIC LOW BACK PAIN WITH SCIATICA, SCIATICA LATERALITY UNSPECIFIED, UNSPECIFIED BACK PAIN LATERALITY: Primary | ICD-10-CM

## 2021-07-28 PROCEDURE — 97162 PT EVAL MOD COMPLEX 30 MIN: CPT | Performed by: PHYSICAL THERAPIST

## 2021-07-28 PROCEDURE — 97113 AQUATIC THERAPY/EXERCISES: CPT | Performed by: PHYSICAL THERAPIST

## 2021-07-29 ENCOUNTER — HOSPITAL ENCOUNTER (OUTPATIENT)
Dept: CT IMAGING | Facility: HOSPITAL | Age: 71
Discharge: HOME | End: 2021-07-29
Attending: RADIOLOGY

## 2021-07-29 ENCOUNTER — APPOINTMENT (OUTPATIENT)
Dept: LAB | Facility: CLINIC | Age: 71
End: 2021-07-29
Payer: MEDICARE

## 2021-07-29 DIAGNOSIS — Q28.2 CEREBRAL ARTERIOVENOUS MALFORMATION (AVM): ICD-10-CM

## 2021-07-29 DIAGNOSIS — N18.31 STAGE 3A CHRONIC KIDNEY DISEASE (HCC): ICD-10-CM

## 2021-07-29 DIAGNOSIS — E78.2 MIXED HYPERLIPIDEMIA: ICD-10-CM

## 2021-07-29 DIAGNOSIS — M25.50 PAIN IN JOINT, MULTIPLE SITES: ICD-10-CM

## 2021-07-29 DIAGNOSIS — R51.0 ORTHOSTATIC HEADACHE: ICD-10-CM

## 2021-07-29 DIAGNOSIS — R53.1 WEAKNESS: ICD-10-CM

## 2021-07-29 DIAGNOSIS — Z12.5 SPECIAL SCREENING FOR MALIGNANT NEOPLASM OF PROSTATE: ICD-10-CM

## 2021-07-29 LAB
25(OH)D3 SERPL-MCNC: 15.2 NG/ML (ref 30–100)
ALBUMIN SERPL BCP-MCNC: 3.8 G/DL (ref 3.5–5)
ALP SERPL-CCNC: 105 U/L (ref 46–116)
ALT SERPL W P-5'-P-CCNC: 29 U/L (ref 12–78)
ANION GAP SERPL CALCULATED.3IONS-SCNC: 7 MMOL/L (ref 4–13)
AST SERPL W P-5'-P-CCNC: 18 U/L (ref 5–45)
BACTERIA UR QL AUTO: NORMAL /HPF
BASOPHILS # BLD AUTO: 0.1 THOUSANDS/ΜL (ref 0–0.1)
BASOPHILS NFR BLD AUTO: 1 % (ref 0–1)
BILIRUB SERPL-MCNC: 0.7 MG/DL (ref 0.2–1)
BILIRUB UR QL STRIP: NEGATIVE
BUN SERPL-MCNC: 19 MG/DL (ref 5–25)
CALCIUM SERPL-MCNC: 9.2 MG/DL (ref 8.3–10.1)
CHLORIDE SERPL-SCNC: 108 MMOL/L (ref 100–108)
CHOLEST SERPL-MCNC: 113 MG/DL (ref 50–200)
CLARITY UR: CLEAR
CO2 SERPL-SCNC: 25 MMOL/L (ref 21–32)
COLOR UR: YELLOW
CREAT SERPL-MCNC: 1.25 MG/DL (ref 0.6–1.3)
CREAT UR-MCNC: 148 MG/DL
EOSINOPHIL # BLD AUTO: 0.7 THOUSAND/ΜL (ref 0–0.61)
EOSINOPHIL NFR BLD AUTO: 10 % (ref 0–6)
ERYTHROCYTE [DISTWIDTH] IN BLOOD BY AUTOMATED COUNT: 13 % (ref 11.6–15.1)
GFR SERPL CREATININE-BSD FRML MDRD: 58 ML/MIN/1.73SQ M
GLUCOSE P FAST SERPL-MCNC: 94 MG/DL (ref 65–99)
GLUCOSE UR STRIP-MCNC: NEGATIVE MG/DL
HCT VFR BLD AUTO: 50.4 % (ref 36.5–49.3)
HDLC SERPL-MCNC: 39 MG/DL
HGB BLD-MCNC: 16.1 G/DL (ref 12–17)
HGB UR QL STRIP.AUTO: ABNORMAL
HYALINE CASTS #/AREA URNS LPF: NORMAL /LPF
IMM GRANULOCYTES # BLD AUTO: 0.02 THOUSAND/UL (ref 0–0.2)
IMM GRANULOCYTES NFR BLD AUTO: 0 % (ref 0–2)
KETONES UR STRIP-MCNC: NEGATIVE MG/DL
LDLC SERPL CALC-MCNC: 61 MG/DL (ref 0–100)
LEUKOCYTE ESTERASE UR QL STRIP: NEGATIVE
LYMPHOCYTES # BLD AUTO: 1.54 THOUSANDS/ΜL (ref 0.6–4.47)
LYMPHOCYTES NFR BLD AUTO: 21 % (ref 14–44)
MCH RBC QN AUTO: 30.6 PG (ref 26.8–34.3)
MCHC RBC AUTO-ENTMCNC: 31.9 G/DL (ref 31.4–37.4)
MCV RBC AUTO: 96 FL (ref 82–98)
MONOCYTES # BLD AUTO: 0.56 THOUSAND/ΜL (ref 0.17–1.22)
MONOCYTES NFR BLD AUTO: 8 % (ref 4–12)
NEUTROPHILS # BLD AUTO: 4.28 THOUSANDS/ΜL (ref 1.85–7.62)
NEUTS SEG NFR BLD AUTO: 60 % (ref 43–75)
NITRITE UR QL STRIP: NEGATIVE
NON-SQ EPI CELLS URNS QL MICRO: NORMAL /HPF
NONHDLC SERPL-MCNC: 74 MG/DL
NRBC BLD AUTO-RTO: 0 /100 WBCS
PH UR STRIP.AUTO: 6 [PH]
PHOSPHATE SERPL-MCNC: 3 MG/DL (ref 2.3–4.1)
PLATELET # BLD AUTO: 250 THOUSANDS/UL (ref 149–390)
PMV BLD AUTO: 11.1 FL (ref 8.9–12.7)
POTASSIUM SERPL-SCNC: 4 MMOL/L (ref 3.5–5.3)
PROT SERPL-MCNC: 7.3 G/DL (ref 6.4–8.2)
PROT UR STRIP-MCNC: NEGATIVE MG/DL
PROT UR-MCNC: 13 MG/DL
PROT/CREAT UR: 0.09 MG/G{CREAT} (ref 0–0.1)
PSA SERPL-MCNC: 4.6 NG/ML (ref 0–4)
PTH-INTACT SERPL-MCNC: 30.4 PG/ML (ref 18.4–80.1)
RBC # BLD AUTO: 5.27 MILLION/UL (ref 3.88–5.62)
RBC #/AREA URNS AUTO: NORMAL /HPF
SODIUM SERPL-SCNC: 140 MMOL/L (ref 136–145)
SP GR UR STRIP.AUTO: 1.02 (ref 1–1.03)
T3 SERPL-MCNC: 0.8 NG/ML (ref 0.6–1.8)
T4 SERPL-MCNC: 7 UG/DL (ref 4.7–13.3)
TRIGL SERPL-MCNC: 66 MG/DL
TSH SERPL DL<=0.05 MIU/L-ACNC: 1.31 UIU/ML (ref 0.36–3.74)
URATE SERPL-MCNC: 3.7 MG/DL (ref 4.2–8)
UROBILINOGEN UR QL STRIP.AUTO: 1 E.U./DL
WBC # BLD AUTO: 7.2 THOUSAND/UL (ref 4.31–10.16)
WBC #/AREA URNS AUTO: NORMAL /HPF

## 2021-07-29 PROCEDURE — 81001 URINALYSIS AUTO W/SCOPE: CPT

## 2021-07-29 PROCEDURE — 82570 ASSAY OF URINE CREATININE: CPT

## 2021-07-29 PROCEDURE — 84550 ASSAY OF BLOOD/URIC ACID: CPT

## 2021-07-29 PROCEDURE — G0103 PSA SCREENING: HCPCS

## 2021-07-29 PROCEDURE — 83970 ASSAY OF PARATHORMONE: CPT

## 2021-07-29 PROCEDURE — 84480 ASSAY TRIIODOTHYRONINE (T3): CPT

## 2021-07-29 PROCEDURE — 84436 ASSAY OF TOTAL THYROXINE: CPT

## 2021-07-29 PROCEDURE — 84156 ASSAY OF PROTEIN URINE: CPT

## 2021-07-29 PROCEDURE — 84443 ASSAY THYROID STIM HORMONE: CPT

## 2021-07-29 PROCEDURE — 84100 ASSAY OF PHOSPHORUS: CPT

## 2021-07-29 PROCEDURE — 82306 VITAMIN D 25 HYDROXY: CPT

## 2021-07-29 PROCEDURE — 36415 COLL VENOUS BLD VENIPUNCTURE: CPT

## 2021-07-29 PROCEDURE — 85025 COMPLETE CBC W/AUTO DIFF WBC: CPT

## 2021-07-29 PROCEDURE — 80061 LIPID PANEL: CPT

## 2021-07-29 PROCEDURE — 80053 COMPREHEN METABOLIC PANEL: CPT

## 2021-07-30 ENCOUNTER — CONSULT (OUTPATIENT)
Dept: PAIN MEDICINE | Facility: CLINIC | Age: 71
End: 2021-07-30
Payer: MEDICARE

## 2021-07-30 VITALS
RESPIRATION RATE: 18 BRPM | HEIGHT: 71 IN | WEIGHT: 181 LBS | DIASTOLIC BLOOD PRESSURE: 76 MMHG | HEART RATE: 57 BPM | BODY MASS INDEX: 25.34 KG/M2 | SYSTOLIC BLOOD PRESSURE: 121 MMHG

## 2021-07-30 DIAGNOSIS — M51.26 LUMBAR HERNIATED DISC: ICD-10-CM

## 2021-07-30 DIAGNOSIS — M48.00 CENTRAL STENOSIS OF SPINAL CANAL: ICD-10-CM

## 2021-07-30 DIAGNOSIS — M47.816 LUMBAR FACET ARTHROPATHY: ICD-10-CM

## 2021-07-30 DIAGNOSIS — M48.061 FORAMINAL STENOSIS OF LUMBAR REGION: ICD-10-CM

## 2021-07-30 PROCEDURE — 99204 OFFICE O/P NEW MOD 45 MIN: CPT | Performed by: STUDENT IN AN ORGANIZED HEALTH CARE EDUCATION/TRAINING PROGRAM

## 2021-07-30 NOTE — PATIENT INSTRUCTIONS
Epidural Steroid Injection   AMBULATORY CARE:   What you need to know about an epidural steroid injection (DAWN):  An DAWN is a procedure to inject steroid medicine into the epidural space  The epidural space is between your spinal cord and vertebrae  Steroids reduce inflammation and fluid buildup in your spine that may be causing pain  You may be given pain medicine along with the steroids  How to prepare for an DAWN:  Your healthcare provider will talk to you about how to prepare for your procedure  He or she will tell you what medicines to take or not take on the day of your procedure  You may need to stop taking blood thinners or other medicines several days before your procedure  You may need to adjust any diabetes medicine you take on the day of your procedure  Steroid medicine can increase your blood sugar level  Arrange for someone to drive you home when you are discharged  What will happen during an DAWN:   · You will be given medicine to numb the procedure area  You will be awake for the procedure, but you will not feel pain  You may also be given medicine to help you relax  Contrast liquid will be used to help your healthcare provider see the area better  Tell the healthcare provider if you have ever had an allergic reaction to contrast liquid  · Your healthcare provider may place the needle into your neck area, middle of your back, or tailbone area  He may inject the medicine next to the nerves that are causing your pain  He may instead inject the medicine into a larger area of the epidural space  This helps the medicine spread to more nerves  Your healthcare provider will use a fluoroscope to help guide the needle to the right place  A fluoroscope is a type of x-ray  After the procedure, a bandage will be placed over the injection site to prevent infection  What will happen after an DAWN:  You will have a bandage over the injection site to prevent infection   Your healthcare provider will tell you when you can bathe and any activity guidelines  You will be able to go home  Risks of an DAWN:  You may have temporary or permanent nerve damage or paralysis  You may have bleeding or develop a serious infection, such as meningitis (swelling of the brain coverings)  An abscess may also develop  An abscess is a pus-filled area under the skin  You may need surgery to fix the abscess  You may have a seizure, anxiety, or trouble sleeping  If you are a man, you may have temporary erectile dysfunction (not able to have an erection)  Call your local emergency number (911 in the [address] [place] [address],[address]) if:   · You have a seizure  · You have trouble moving your legs  Seek care immediately if:   · Blood soaks through your bandage  · You have a fever or chills, severe back pain, and the procedure area is sensitive to the touch  · You cannot control when you urinate or have a bowel movement  Call your doctor if:   · You have weakness or numbness in your legs  · Your wound is red, swollen, or draining pus  · You have nausea or are vomiting  · Your face or neck is red and you feel warm  · You have more pain than you had before the procedure  · You have swelling in your hands or feet  · You have questions or concerns about your condition or care  Care for your wound as directed: You may remove the bandage before you go to bed the day of your procedure  You may take a shower, but do not take a bath for at least 24 hours  Self-care:   · Do not drive,  use machines, or do strenuous activity for 24 hours after your procedure or as directed  · Continue other treatments  as directed  Steroid injections alone will not control your pain  The injections are meant to be used with other treatments, such as physical therapy  Follow up with your doctor as directed:  Write down your questions so you remember to ask them during your visits     © Copyright [employer] 2021 Information is for End User's use only and may not be sold, redistributed or otherwise used for commercial purposes  All illustrations and images included in CareNotes® are the copyrighted property of [employer]  or Shannon Quiñones  The above information is an  only  It is not intended as medical advice for individual conditions or treatments  Talk to your doctor, nurse or pharmacist before following any medical regimen to see if it is safe and effective for you

## 2021-07-30 NOTE — PROGRESS NOTES
Assessment  1  Central stenosis of spinal canal    2  Lumbar herniated disc    3  Foraminal stenosis of lumbar region    4  Lumbar facet arthropathy        Plan    THIS IS A 79YEAR-OLD MALE WHO PRESENTS TO OUR office a chief complaint of right-sided lower thoracic and lumbar pain with intermittent right lower extremity radiculopathy  He has positive straight leg raise and positive seated slump test on the right in the setting of severe canal stenosis at L4-5  He has noted hypertonicity in the thoracic and lumbar paraspinous musculature consistent with muscle spasm in the setting of reversal of lordosis on imaging and report of more pain in these areas with flexion as opposed to extension  Appears he is clinically symptomatic from muscle spasm as well as lumbar radiculopathy secondary to spinal canal stenosis  For his myofascial symptoms, encouraged him to continue with aqua therapy  Given the patient's symptoms, examination findings and imaging results noted above, I discussed the utility of proceeding with a Right parasagittal L5-S1 interlaminar epidural steroid injection under fluoroscopic guidance  Using an anatomical model, the procedure, as well as its potential risks, benefits, and reasonable alternatives were discussed in detail  Discussed risks of the procedure included but are not limited to bleeding, infection, allergic reaction, nerve damage, hematoma fomation, abscess formation, failure of the pain to improve and potential worsening of the pain  Since Mr Bita Yen has failed at least 6 weeks of conservative measures including over-the-counter pain medications, prescription medications, chiropractic therapy, physical therapy and a home exercise program it is reasonable to proceed with the above injection  The patient verbalized understanding to the potential risks, benefits, and reasonable alternatives to the above injection and wishes to proceed    His response will help to further determine a treatment plan  We will reassess how he is doing after 1st injection  Advised that based on his presentation, may need repeat injection with alteration in level or approach  Also discussed MBB/RFA for axial pain, though given his radicular symptoms, discussed starting with LESI 1st       [place] Prescription Drug Monitoring Program report was reviewed and was appropriate     My impressions and treatment recommendations were discussed in detail with the patient who verbalized understanding and had no further questions  Discharge instructions were provided  I personally saw and examined the patient and I agree with the above discussed plan of care  Orders Placed This Encounter   Procedures    FL spine and pain procedure     Standing Status:   Future     Standing Expiration Date:   7/30/2025     Order Specific Question:   Reason for Exam:     Answer:   Right parasaggital L5-1 LESI     Order Specific Question:   Anticoagulant hold needed? Answer:   PLAVIX     No orders of the defined types were placed in this encounter  History of Present Illness    Referring Provider: DO Aziza Bullock Chloe is a 79 y o  male who presents with a chief complaint of low back pain, worse on the right side  This is a chronic issue for over 10 years  Pain began of an undetermined cause  Over the past month, intensity the pain has been moderate to severe  Current pain is 7/10 but can escalate to a 9/10 with activities  Pain is constant  During the past month pain has been worse at all times a day with no typical pattern  She describes it as shooting, sharp, throbbing  Does not use any assistive device to ambulate  Worst pain is on the right side of the back in the lower right thoracic paraspinal area radiation down to about the L3 level  Reports very intermittent pain going down right leg  Denies current neurogenic claudication symptoms with ambulation       Pain is increased with standing, bending, exercising, coughing, sneezing  No change with bowel movement, walking  Decreased with lying down and sitting  Has seen Dr  her Achilles and was ordered MRI of the lumbar spine recently which showed reversal of lordosis  He has multilevel facet hypertrophy  Has severe canal stenosis at L4-5 and moderate canal stenosis at L5-S1  Also has moderate to severe right foraminal stenosis at L5-S1 and moderate to severe bilateral foraminal stenosis at L4-5  Surgical history noted for carotid artery stent placement  Past medical history includes high cholesterol, hypertension, and kidney disease  Reports moderate relief from nerve injection and heat/ice therapy in the past   No relief with chiropractic manipulation and no relief with PT in the past   He is restarting PT as ordered by Sports Medicine  Smokes tobacco, 7 cigarettes a day for the last 20 years  Does not smoke marijuana  Drinks 3 beers a week  He is currently on Plavix  Not allergic to latex  Has been using tizanidine for muscle spasms and Tylenol with little relief  I have personally reviewed and/or updated the patient's past medical history, past surgical history, family history, social history, current medications, allergies, and vital signs today  Review of Systems   Constitutional: Negative for fever and unexpected weight change  HENT: Negative for trouble swallowing  Eyes: Negative for visual disturbance  Respiratory: Negative for shortness of breath and wheezing  Cardiovascular: Negative for chest pain and palpitations  Gastrointestinal: Negative for constipation, diarrhea, nausea and vomiting  Endocrine: Negative for cold intolerance, heat intolerance and polydipsia  Genitourinary: Negative for difficulty urinating and frequency  Musculoskeletal: Positive for back pain and joint swelling  Negative for arthralgias, gait problem and myalgias  Skin: Negative for rash     Neurological: Negative for dizziness, seizures, syncope, weakness and headaches  Hematological: Does not bruise/bleed easily  Psychiatric/Behavioral: Negative for dysphoric mood  Depression   All other systems reviewed and are negative  Patient Active Problem List   Diagnosis    Cerebral arteriovenous malformation (AVM)    Dizziness    Asymptomatic bilateral carotid artery stenosis    Atherosclerosis of lower extremity with claudication (HCC)    Vertebral artery occlusion, left    Stage 3a chronic kidney disease (HCC)    Palpitations    CAD (coronary artery disease)    Essential hypertension    Chronic kidney disease-mineral and bone disorder       Past Medical History:   Diagnosis Date    Bilateral carotid artery stenosis     Cerebral arteriovenous malformation (AVM)     Chronic kidney disease     Coronary artery disease     High blood pressure     Hyperlipidemia     Hypertension     PVD (peripheral vascular disease) with claudication     Vertebral artery occlusion, left        Past Surgical History:   Procedure Laterality Date    CARDIAC CATHETERIZATION  02/08/2021    Mid LAD eccentric 50-60% stenosis, distal LAD 50% stenosis, apical LAD very small caliber vessel with discrete 90% stenosis after it wraps around apex, proximal ramus 40% stenosis, Co-dominant RCA has proximal aneurysm followed by distal 50% stenosis, RPLA is small to medium caliber vessel will discrete 70% stenosis    If patient remains symptomatic even after medication optimization then considera    COLONOSCOPY      IR CAROTID STENT  4/1/2021    LITHOTRIPSY      PA TCAT IV STENT CRV CRTD ART EMBOLIC PROTECJ Right 7/9/2302    Procedure: ANGIOPLASTY ARTERY CAROTID W/ STENT TCAR [address];  Surgeon: Tenisha Kenney MD;  Location: [facility];  Service: Vascular    SKIN BIOPSY         Family History   Problem Relation Age of Onset    Stroke Mother     Heart disease Mother     Stroke Father     Lung cancer Father     No Known Problems Brother     No Known Problems Maternal Grandmother     No Known Problems Maternal Grandfather     No Known Problems Paternal Grandmother     No Known Problems Paternal Grandfather     No Known Problems Daughter     No Known Problems Son     No Known Problems Son        Social History     Occupational History    Not on file   Tobacco Use    Smoking status: Current Every Day Smoker     Packs/day: 6 00     Types: Cigarettes    Smokeless tobacco: Never Used   Vaping Use    Vaping Use: Never used   Substance and Sexual Activity    Alcohol use: Yes     Alcohol/week: 1 0 standard drinks     Types: 1 Cans of beer per week    Drug use: Never    Sexual activity: Not Currently       Current Outpatient Medications on File Prior to Visit   Medication Sig    amLODIPine (NORVASC) 5 mg tablet Take 1 tablet (5 mg total) by mouth daily    aspirin (ECOTRIN LOW STRENGTH) 81 mg EC tablet Take 81 mg by mouth daily    atorvastatin (LIPITOR) 80 mg tablet Take 1 tablet (80 mg total) by mouth daily    clopidogrel (PLAVIX) 75 mg tablet Take 1 tablet (75 mg total) by mouth daily    lisinopril (ZESTRIL) 20 mg tablet Take 1 tablet (20 mg total) by mouth daily    tiZANidine (ZANAFLEX) 4 mg tablet Take 1 tablet (4 mg total) by mouth 2 (two) times a day as needed for muscle spasms     No current facility-administered medications on file prior to visit  Allergies   Allergen Reactions    Butalbital-Apap-Caffeine Rash     Hands swell/redness       Physical Exam    /76   Pulse 57   Resp 18   Ht 5' 10 5" (1 791 m)   Wt 82 1 kg (181 lb)   BMI 25 60 kg/m²     Constitutional: normal, well developed, well nourished, alert, in no distress and non-toxic and no overt pain behavior    Eyes: anicteric  HEENT: grossly intact  Neck: supple, symmetric, trachea midline and no masses   Pulmonary:even and unlabored  Cardiovascular:No edema or pitting edema present  Skin:Normal without rashes or lesions and well hydrated  Psychiatric:Mood and affect appropriate  Neurologic:Cranial Nerves II-XII grossly intact  Musculoskeletal:normal     Lumbar Spine Exam    Appearance:  Normal lordosis  Palpation/Tenderness:  Bilateral thoracic and lumbar paraspinal hypertonicity noted  TTP from right lower thoracic paraspinal region down to about L3 right parasapinal region  Sensory:  no sensory deficits noted  Range of Motion:  Flexion: Moderately limited  with pain  Extension:  Moderately limited  with pain  Lateral Flexion - Left:  Moderately limited  with pain  Lateral Flexion - Right:  Moderately limited  with pain  Rotation - Left:  Minimally limited  without pain  Rotation - Right:  Moderately limited  with pain   More notable pain on right with facet loading and lateral flexion to the right and turning to the right  Motor Strength:  Left hip flexion:  5/5  Left hip extension:  5/5  Right hip flexion:  5/5  Right hip extension:  5/5  Left knee flexion:  5/5  Left knee extension:  5/5  Right knee flexion:  5/5  Right knee extension:  5/5  Left foot dorsiflexion:  5/5  Left foot plantar flexion:  5/5  Right foot dorsiflexion:  5/5  Right foot plantar flexion:  5/5  Reflexes:  Left Patellar:  2+   Right Patellar:  2+   Left Achilles:  2+   Right Achilles:  2+   Special Tests:  Left Straight Leg Test:  negative  Right Straight Leg Test:  positive   Slump test: negative on left, positive on right      DIAGNOSTIC IMAGING AND TEST RESULTS:  Straightening of lumbar lordosis, L5-S1 annular tear moderate to severe right mild-to-moderate left stenosis  Moderate canal stenosis  Bilateral hypertrophic facet disease  L4-5:  Broad-based herniated disc  Moderate to severe bilateral foraminal stenosis  Severely stenotic canal   Hypertrophic facet disease and LfH   L3-4:  Mild to moderate bilateral foraminal stenosis  Moderate canal stenosis  Bilateral hypertrophic facet  Annular tears T12-L3 with mild foraminal narrowing

## 2021-08-02 ENCOUNTER — TELEPHONE (OUTPATIENT)
Dept: RADIOLOGY | Facility: CLINIC | Age: 71
End: 2021-08-02

## 2021-08-02 NOTE — TELEPHONE ENCOUNTER
Lm for pt to cb  Does pt still take plavix and aspirin? Who prescribes plavix? Vascular surgery?     Will need to obtain hold approval to schedule LESI

## 2021-08-02 NOTE — TELEPHONE ENCOUNTER
Pt said that he is still taking aspirin and the plavix  His pcp prescribe the Plavix for him  That is all the info I was able to get from the pt, he was in a rush and had to go   Give him a call back thank you

## 2021-08-03 ENCOUNTER — OFFICE VISIT (OUTPATIENT)
Dept: CARDIOLOGY CLINIC | Facility: CLINIC | Age: 71
End: 2021-08-03
Payer: MEDICARE

## 2021-08-03 ENCOUNTER — OFFICE VISIT (OUTPATIENT)
Dept: PHYSICAL THERAPY | Age: 71
End: 2021-08-03
Payer: MEDICARE

## 2021-08-03 VITALS
WEIGHT: 179 LBS | SYSTOLIC BLOOD PRESSURE: 126 MMHG | DIASTOLIC BLOOD PRESSURE: 82 MMHG | BODY MASS INDEX: 25.06 KG/M2 | HEIGHT: 71 IN

## 2021-08-03 DIAGNOSIS — F17.200 CURRENT SMOKER: ICD-10-CM

## 2021-08-03 DIAGNOSIS — G89.29 CHRONIC LOW BACK PAIN WITH SCIATICA, SCIATICA LATERALITY UNSPECIFIED, UNSPECIFIED BACK PAIN LATERALITY: Primary | ICD-10-CM

## 2021-08-03 DIAGNOSIS — I25.10 CAD (CORONARY ARTERY DISEASE): Primary | ICD-10-CM

## 2021-08-03 DIAGNOSIS — M54.40 CHRONIC LOW BACK PAIN WITH SCIATICA, SCIATICA LATERALITY UNSPECIFIED, UNSPECIFIED BACK PAIN LATERALITY: Primary | ICD-10-CM

## 2021-08-03 DIAGNOSIS — I44.7 LEFT BUNDLE BRANCH BLOCK: ICD-10-CM

## 2021-08-03 DIAGNOSIS — E78.5 HYPERLIPIDEMIA, UNSPECIFIED HYPERLIPIDEMIA TYPE: ICD-10-CM

## 2021-08-03 DIAGNOSIS — R42 DIZZINESS: ICD-10-CM

## 2021-08-03 PROCEDURE — 97113 AQUATIC THERAPY/EXERCISES: CPT | Performed by: PHYSICAL THERAPIST

## 2021-08-03 PROCEDURE — 93000 ELECTROCARDIOGRAM COMPLETE: CPT | Performed by: INTERNAL MEDICINE

## 2021-08-03 PROCEDURE — 99214 OFFICE O/P EST MOD 30 MIN: CPT | Performed by: INTERNAL MEDICINE

## 2021-08-03 NOTE — PROGRESS NOTES
[facility] Lynette Taylor [facility] [id number] [phone]                                              Cardiology Office Follow up  Sofie Larsen, 79 y o  male  YOB: 1950  MRN: [id number] Encounter: [id number]      PCP - Carli Cruz, DO    Assessment  1  Coronary artery disease  · LHC - 2/8/21 - LAD - large, ectatic, mLAD 50-60% (negative iFR), dLAD 50%, apical LAD 90% - very small, RI 40%, RPLA - 70% stenosis (small-medium, appears amenable to PCI)  · Nuclear stress test - 1/25/21 - LVEF 57%, stress ECG - developed LBBB with discordant ST elevation >5 mm in V3 during lexiscan infusion, had moderate size fixed perfusion defect of intraseptal wall which resolved with prone imaging  Small partially reversible defect of anteroseptal wall  2  LBBB  · Possibly rate related, but develops with HR in 60s even  3  Hyperlipidemia  4  Hypertension  5  Bilateral carotid artery stenosis   · S/p Rt carotid angioplasty w/ stent TCAR (4/1/2021)  6  Dizziness  7  Left vertebral artery stenosis   8  Lower extremity claudication  9  Current smoker, motivated to quit  · Previously quit, now back to smoking    Plan  Dizziness, palpitations  · Improved since carotid stenting  · Zio-patch - 2/2021 - 22 short runs of SVT, upto 12 beats - suggestive of atrial tachycardia    · Has mild orthostatic symptoms still, but otherwise resolved    CAD, Left bundle branch block  · Has moderate CAD in multiple vessels, and severe stenosis in 2 smaller vessels - apical LAD and RPLA  · No chest pain or shortness of breath  · Not on BB due to bradycardia/LBBB  · Continue aspirin, atorvastatin, amlodipine   · If symptomatic in future, RPLA lesion is amenable to intervention, per cath report  · Counseled on smoking cessation    Hyperlipidemia   3/7/2019 08:13 1/29/2021 10:16 3/24/2021 09:02 7/29/2021 09:19   Cholesterol 226 (H) 164 111 113   Triglycerides 104 77 66 66   HDL 43 38 (L)  39 (L)   Non-HDL Cholesterol 183   74   LDL Calculated 162 (H) 111 (H)  61     · Improved since initiation of atorvastatin, now well controlled   · Continue atorvastatin 80 mg daily    Hypertension  ·   Blood pressure well controlled   ·  continue amlodipine 5 mg, lisinopril 20 mg    Current smoker  · Still smoking 2-4 cigarettes per day, but is cutting down   · Chantix was too expensive for him, and was not covered by insurance   · Again counseled him regarding need to quit smoking completely, but he does not seem to be fully motivated    Elevated PSA  ·  review of lab shows mildly elevated PSA at 4 6, up from 1 8  · I have counseled him regarding the same, and asked him to follow up with PCP on the same      Orders Placed This Encounter   Procedures    POCT ECG     Results for orders placed or performed in visit on 08/03/21   POCT ECG    Impression    Normal sinus rhythm, left axis deviation,   cannot rule out old anteroseptal infarct       Return in about 6 months (around 2/3/2022), or if symptoms worsen or fail to improve  History of Present Illness   79year-old gentleman comes in as a new patient for evaluation of palpitations and dizziness, after being referred by vascular surgery Dr Cornejo Doctor  Over the past few months, patient reports having intermittent episodes of dizziness and "lack of equilibrium", which has been occurring more frequently  He seems to report 2 different types of dizziness, 1 of which happens when he bends forward and gets up or with changes in position  The other form of dizziness seems to happen even at rest   During 1 such episode, while he was trying to get testing completed for MRI/MRA, he was driving to get a test done, and had to stop by the side of the road due to feeling dizzy and having a sense of dysequilibrium  He could not continue driving and had to call EMS who took him came to the ED at [facility] for further evaluation     After brief testing in the ED, he was discharged with outpatient follow-up    He has continued to have intermittent dizziness since then, and followed of with vascular surgery Dr Oliveros In late December, and was referred to see Cardiology and neurology for further evaluation of same    Interval history - 3/16/2021  He comes back for follow-up after completing testing and undergoing cardiac catheterization  His catheterization revealed moderate CAD and small vessel disease, and was recommended continued medical therapy  He continues to be symptom free and does not report any complains of chest pain or shortness of breath  He has started to smoke again    Interval history - 8/3/2021   he comes back for follow-up after about 5 months  He has been doing well overall, and underwent a carotid stenting without any major problems  His symptoms of dizziness seem to have resolved since then, although he does still have mild orthostatic symptoms at times  No complains of near-syncope or syncope  He remains compliant with medications, but continues to smoke 2-4 cigarettes  Historical Information   Past Medical History:   Diagnosis Date    Bilateral carotid artery stenosis     Cerebral arteriovenous malformation (AVM)     Chronic kidney disease     Coronary artery disease     High blood pressure     Hyperlipidemia     Hypertension     PVD (peripheral vascular disease) with claudication     Vertebral artery occlusion, left      Past Surgical History:   Procedure Laterality Date    CARDIAC CATHETERIZATION  02/08/2021    Mid LAD eccentric 50-60% stenosis, distal LAD 50% stenosis, apical LAD very small caliber vessel with discrete 90% stenosis after it wraps around apex, proximal ramus 40% stenosis, Co-dominant RCA has proximal aneurysm followed by distal 50% stenosis, RPLA is small to medium caliber vessel will discrete 70% stenosis    If patient remains symptomatic even after medication optimization then considera    COLONOSCOPY      IR CAROTID STENT  4/1/2021    LITHOTRIPSY      RI TCAT IV STENT CRV CRTD ART EMBOLIC PROTECJ Right 5/0/2373    Procedure: ANGIOPLASTY ARTERY CAROTID W/ STENT TCAR [address];  Surgeon: Estefania Rocha MD;  Location: [facility];  Service: Vascular    SKIN BIOPSY       Family History   Problem Relation Age of Onset    Stroke Mother     Heart disease Mother     Stroke Father     Lung cancer Father     No Known Problems Brother     No Known Problems Maternal Grandmother     No Known Problems Maternal Grandfather     No Known Problems Paternal Grandmother     No Known Problems Paternal Grandfather     No Known Problems Daughter     No Known Problems Son     No Known Problems Son      Current Outpatient Medications on File Prior to Visit   Medication Sig Dispense Refill    amLODIPine (NORVASC) 5 mg tablet Take 1 tablet (5 mg total) by mouth daily  0    aspirin (ECOTRIN LOW STRENGTH) 81 mg EC tablet Take 81 mg by mouth daily      atorvastatin (LIPITOR) 80 mg tablet Take 1 tablet (80 mg total) by mouth daily      clopidogrel (PLAVIX) 75 mg tablet Take 1 tablet (75 mg total) by mouth daily 90 tablet 1    lisinopril (ZESTRIL) 20 mg tablet Take 1 tablet (20 mg total) by mouth daily      tiZANidine (ZANAFLEX) 4 mg tablet Take 1 tablet (4 mg total) by mouth 2 (two) times a day as needed for muscle spasms 60 tablet 1     No current facility-administered medications on file prior to visit       Allergies   Allergen Reactions    Butalbital-Apap-Caffeine Rash     Hands swell/redness     Social History     Socioeconomic History    Marital status: /Civil Union     Spouse name: None    Number of children: None    Years of education: None    Highest education level: None   Occupational History    None   Tobacco Use    Smoking status: Current Every Day Smoker     Packs/day: 6 00     Types: Cigarettes    Smokeless tobacco: Never Used   Vaping Use    Vaping Use: Never used   Substance and Sexual Activity    Alcohol use: Yes     Alcohol/week: 1 0 standard drinks     Types: 1 Cans of beer per week    Drug use: Never    Sexual activity: Not Currently   Other Topics Concern    None   Social History Narrative    None     Social Determinants of Health     Financial Resource Strain:     Difficulty of Paying Living Expenses:    Food Insecurity:     Worried About Running Out of Food in the Last Year:     Ran Out of Food in the Last Year:    Transportation Needs:     Lack of Transportation (Medical):  Lack of Transportation (Non-Medical):    Physical Activity:     Days of Exercise per Week:     Minutes of Exercise per Session:    Stress:     Feeling of Stress :    Social Connections:     Frequency of Communication with Friends and Family:     Frequency of Social Gatherings with Friends and Family:     Attends Orthodoxy Services:     Active Member of Clubs or Organizations:     Attends Club or Organization Meetings:     Marital Status:    Intimate Partner Violence:     Fear of Current or Ex-Partner:     Emotionally Abused:     Physically Abused:     Sexually Abused:         Review of Systems   Neurological: Positive for dizziness  All other systems reviewed and are negative  Vitals:  Vitals:    08/03/21 1219   BP: 126/82   Weight: 81 2 kg (179 lb)   Height: 5' 10 5" (1 791 m)     BMI - Body mass index is 25 32 kg/m²  Wt Readings from Last 7 Encounters:   08/03/21 81 2 kg (179 lb)   07/30/21 82 1 kg (181 lb)   07/07/21 82 6 kg (182 lb)   06/14/21 84 8 kg (187 lb)   05/19/21 90 2 kg (198 lb 12 8 oz)   05/18/21 86 2 kg (190 lb)   04/20/21 87 5 kg (193 lb)       Physical Exam  Vitals and nursing note reviewed  Constitutional:       General: He is not in acute distress  Appearance: Normal appearance  He is well-developed  He is not ill-appearing or diaphoretic  HENT:      Head: Normocephalic and atraumatic  Nose: No congestion  Eyes:      General: No scleral icterus  Conjunctiva/sclera: Conjunctivae normal    Neck:      Vascular: No carotid bruit or JVD  Cardiovascular:      Rate and Rhythm: Normal rate and regular rhythm  Heart sounds: Normal heart sounds  No murmur heard  No friction rub  No gallop  Pulmonary:      Effort: Pulmonary effort is normal  No respiratory distress  Breath sounds: Normal breath sounds  No wheezing or rales  Chest:      Chest wall: No tenderness  Abdominal:      General: There is no distension  Palpations: Abdomen is soft  Tenderness: There is no abdominal tenderness  Musculoskeletal:         General: No swelling, tenderness or deformity  Cervical back: Neck supple  No muscular tenderness  Right lower leg: No edema  Left lower leg: No edema  Skin:     General: Skin is warm  Neurological:      Mental Status: He is alert and oriented to person, place, and time  Mental status is at baseline  Psychiatric:         Mood and Affect: Mood normal          Behavior: Behavior normal          Thought Content:  Thought content normal        Labs:  CBC:   Lab Results   Component Value Date    WBC 7 20 07/29/2021    RBC 5 27 07/29/2021    HGB 16 1 07/29/2021    HCT 50 4 (H) 07/29/2021    MCV 96 07/29/2021     07/29/2021    RDW 13 0 07/29/2021       CMP:   Lab Results   Component Value Date    K 4 0 07/29/2021     07/29/2021    CO2 25 07/29/2021    ANIONGAP 13 04/23/2014    BUN 19 07/29/2021    CREATININE 1 25 07/29/2021    EGFR 58 07/29/2021    CALCIUM 9 2 07/29/2021    AST 18 07/29/2021    ALT 29 07/29/2021    ALKPHOS 105 07/29/2021       Magnesium:  No results found for: MG    Lipid Profile:   Lab Results   Component Value Date    HDL 39 (L) 07/29/2021    TRIG 66 07/29/2021    LDLCALC 61 07/29/2021       Thyroid Studies:   Lab Results   Component Value Date    BXP8HRECXZUQ 1 310 07/29/2021    J4INTTS 0 80 07/29/2021    R7VMLAB 7 0 07/29/2021       No components found for: [facility]    Lab Results Component Value Date    INR 1 11 04/02/2021    INR 1 04 03/24/2021    INR 0 93 01/29/2021   5    Imaging: Vas Abdominal Aorta/iliacs; Complete Study    Result Date: 12/10/2020  Narrative:  [facility] REPORT [facility]INICAL: Indications:  Claudication [I70 219]  Evaluate for progression of Aorto-Iliac occlusive disease  Patient reports bilateral lower extremity pain after walking 1-2 blocks  Risk Factors The patient has history of HTN and previous smoking (quit <1yr ago)  Clinical Right Pressure:  142/ mm Hg, Left Pressure:  136/ mm Hg  FINDINGS:  Unilateral     Impression  PSV (cm/s)  EDV (cm/s)  AP (cm)  Sup-Bertha Ao                         79                  2 1  Px Inf-Farshad Ao                      82                  2 1  Ds Inf-Farshad Ao                      73          84      1 8  Celiac                            149          37           Prox  SMA      >70%               328          34            Right           PSV (cm/s)  EDV (cm/s)  AP (cm)  Prox ALICIA                89                  1 5  Dist ALICIA               112                       Internal Iliac          60                       Prox  EIA              123                  1 0  Dist EIA               216                  1 2  Prox Renal             124          26            Left            Impression  PSV (cm/s)  EDV (cm/s)  AP (cm)  Prox ALICIA        Ectatic             80                  1 6  Dist ALICIA                            78          24      1 3  Internal Iliac                      56                       Prox  EIA                          124                       Dist EIA                           135                  1 3  Prox Renal                         183          53              CONCLUSION:  Impression  The aorta is patent and normal in caliber measuring 2 1 cm in its greatest diameter  The left common iliac artery is ectatic measuring 1 6 cm   The left common iliac artery and bilateral external iliac arteries are patent and normal in caliber  >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  The celiac and bilateral proximal renal arteries are patent  Ankle/Brachial indices are: Rt - 1 08 and Lt - 0 60, severe claudication range  Great toe pressures: Rt - 32 mmHg within the healing range and Lt - flat lined  No prior study for comparison  SIGNATURE: Electronically Signed by: Magdiel Patel MD on 2020-12-10 11:18:18 AM    Vas Carotid Complete Study    Result Date: 12/10/2020  Narrative:  [facility] REPORT CLINICAL: Indications: Patient presents with a carotid disease multiple cardiovascular risk factors  Recent MRA shows right carotid occlusion  Patient is asymptomatic  Risk Factors The patient has history of HTN and previous smoking (quit <1yr ago)  Clinical Right Pressure:  142/ mm Hg, Left Pressure:  136/ mm Hg  FINDINGS:  Right        Impression  PSV  EDV (cm/s)  Direction of Flow  Ratio  Dist  ICA                 74          38                      0 78  Mid  ICA     50 - 69%    135          28                      1 42  Prox  ICA    70 - 99%    401         208                      4 22  Dist CCA                  70          23                            Mid CCA                   95          28                      0 90  Prox CCA                 105          29                            Ext Carotid              131          29                      1 38  Prox Vert                 44          20  Antegrade                 Subclavian               109           0                             Left         Impression  PSV  EDV (cm/s)  Direction of Flow  Ratio  Dist  ICA                 60          24                      0 60  Mid  ICA                  80          37                      0 80  Prox   ICA    1 - 49%     122          51                      1 22  Dist CCA                 104          35                            Mid CCA                  100          33                      0 95  Prox CCA                 106 33                            Ext Carotid               94          29                      0 94  Prox Vert    Resistive    33           0  Antegrade                 Subclavian               119           0                               CONCLUSION:  Impression RIGHT: There is 70-99% stenosis noted in the internal carotid artery  Plaque is heterogenous, calcified and irregular  Vertebral artery flow is antegrade  There is no significant subclavian artery disease  LEFT: There is <50% stenosis noted in the internal carotid artery  Plaque is heterogenous, calcified and irregular  Vertebral artery flow is antegrade and resistive  There is no significant subclavian artery disease  No prior study for comparison  Recommend repeat testing in 6month as per protocol unless otherwise indicated  Internal carotid artery stenosis determination by consensus criteria from: Sam Castelan et al  Carotid Artery Stenosis: Gray-Scale and Doppler US Diagnosis - Society of Radiologists in [facility] [address], Radiology 2003; [phone]  SIGNATURE: Electronically Signed by: Sergio Jerez MD on 2020-12-10 11:20:11 AM    Vas Lower Limb Arterial Duplex, Complete Bilateral    Result Date: 12/10/2020  Narrative:  [facility] REPORT CLINICAL: Indications: Atherosclerosis with Claudication [I70 219]  Patient presents with bilateral lower extremity pain after walking 1-2 blocks  Denies any open wounds or ulcers at this time  Risk Factors The patient has history of HTN and previous smoking (quit <1yr ago)  Clinical Right Pressure:  142/ mm Hg, Left Pressure:  136/ mm Hg    FINDINGS:  Segment                Right            Left                                          PSV (cm/s)  EDV  Impression       PSV (cm/s)  EDV  Common Femoral Artery          95    0                           80    0  Prox Profunda                  77    0                           52       Dist  Profunda 0  Prox SFA                       94    7                           97    0  Mid SFA                        66    0                           73    0  Dist SFA                       67    0  Diffuse Disease          61    0  Proximal Pop                   57    0                           30    0  Distal Pop                     38    0                           37    0  Tibioperoneal                  38                                30       Dist Post Tibial               36    0                           11    0  Prox  Ant  Tibial              55    0                           35    0  Dist  Ant  Tibial                                                25    2  Dist Peroneal                  82    0  Not visualized                       CONCLUSION:  Impression: RIGHT LOWER LIMB: This resting evaluation shows mild diffuse arterial disease without significant focal stenosis  Ankle/Brachial index: 1 08 which is in the normal disease category  PVR/ PPG tracings are slightly dampened  Metatarsal pressure of 60 mmHg Great toe pressure of 32 mmHg, within the healing range  LEFT LOWER LIMB: This resting evaluation shows mild diffuse arterial disease without significant focal stenosis  There is evidence of tibio-peroneal disease with occlusion vs  high grade stenosis of the peroneal artery and monophasic flow in the distal posterior tibial artery  Ankle/Brachial index: 0 60  which is in the severe disease category  PVR/ PPG tracings are dampened  Metatarsal and great toe pressure not audible  SIGNATURE: Electronically Signed by: Srinivas Cha MD on 2020-12-10 11:20:50 AM      Cardiac testing:   No results found for this or any previous visit  No results found for this or any previous visit  No results found for this or any previous visit  No results found for this or any previous visit

## 2021-08-03 NOTE — PROGRESS NOTES
Daily Note     Today's date: 8/3/2021  Patient name: Alban Null  : 1950  MRN: [id number]  Referring provider: Angeline Vargas DO  Dx:   Encounter Diagnosis     ICD-10-CM    1  Chronic low back pain with sciatica, sciatica laterality unspecified, unspecified back pain laterality  M54 40     G89 29        Start Time: 0700  Stop Time: 0800  Total time in clinic (min): 60 minutes    Subjective: patient reports increased back pain secondary to standing x 9 hours at work yesterday  Objective: See treatment diary below      Assessment: Tolerated treatment well  Patient demonstrated fatigue post treatment, exhibited good technique with therapeutic exercises and would benefit from continued PT, decreased pain with [address]: Progress treatment as tolerated         Precautions: CAD, HTN  Precautions:     Daily Treatment Diary     Manual                                                                                   Exercise Diary  7/28 8/3           Water walking 5 10           Postural training             Gait training             Home exercise pgm/patient education             Wall: t/h raises 1 1           Hip abd/add 2 2            1           squats 1 1           Knee flex/ext  1           Step-ups (fwd/bkwd/ss)             SLS (eyes open/closed)             SLS w UE mvmt  AROM/ball toss             BACK extensions             UE Noodle work x 4  4 4           UE AROM             Resistive UE work (paddles, bells, TB)             Core work on noodle (sitting/stdg)  Core press  10x           Sit on noodle with movement             Seated on pool bench w proper posture  1           Ankle df/pf  1                      Hip Ab/add  1           Knee flex/ext  1           Deep water mvmt 5 5           Deep water tx/stretching 5 10           Specific self - stretches wall/steps 5 5               Modalities   8/3           whirlpool 5 10

## 2021-08-03 NOTE — TELEPHONE ENCOUNTER
Dr Aileen Estes,    This pt is to undergo a Lumbar Epidural Steroid injection [facility] Dr Fisher [facility]  He will need to hold his Plavix for 7 days prior to procedure and resume post-op day of  Do you give permission for this or would you prefer to wait until VAS CAROTID study done and reviewed at the office visit scheduled 8/4/21 and 8/10/21 respectively?     Please advise

## 2021-08-04 ENCOUNTER — HOSPITAL ENCOUNTER (OUTPATIENT)
Dept: VASCULAR ULTRASOUND | Facility: HOSPITAL | Age: 71
Discharge: HOME | End: 2021-08-04
Payer: MEDICARE

## 2021-08-04 DIAGNOSIS — I65.23 ASYMPTOMATIC BILATERAL CAROTID ARTERY STENOSIS: ICD-10-CM

## 2021-08-04 PROCEDURE — 93880 EXTRACRANIAL BILAT STUDY: CPT | Performed by: SURGERY

## 2021-08-04 PROCEDURE — 93880 EXTRACRANIAL BILAT STUDY: CPT

## 2021-08-05 ENCOUNTER — APPOINTMENT (OUTPATIENT)
Dept: PHYSICAL THERAPY | Age: 71
End: 2021-08-05
Payer: MEDICARE

## 2021-08-06 ENCOUNTER — OFFICE VISIT (OUTPATIENT)
Dept: PHYSICAL THERAPY | Age: 71
End: 2021-08-06
Payer: MEDICARE

## 2021-08-06 DIAGNOSIS — G89.29 CHRONIC LOW BACK PAIN WITH SCIATICA, SCIATICA LATERALITY UNSPECIFIED, UNSPECIFIED BACK PAIN LATERALITY: Primary | ICD-10-CM

## 2021-08-06 DIAGNOSIS — M54.40 CHRONIC LOW BACK PAIN WITH SCIATICA, SCIATICA LATERALITY UNSPECIFIED, UNSPECIFIED BACK PAIN LATERALITY: Primary | ICD-10-CM

## 2021-08-06 PROCEDURE — 97113 AQUATIC THERAPY/EXERCISES: CPT

## 2021-08-06 NOTE — PROGRESS NOTES
Daily Note     Today's date: 2021  Patient name: Selene White  : 1950  MRN: [id number]  Referring provider: Jose R Guerrero DO  Dx:   Encounter Diagnosis     ICD-10-CM    1  Chronic low back pain with sciatica, sciatica laterality unspecified, unspecified back pain laterality  M54 40     G89 29        Start Time: 0900  Stop Time: 1000  Total time in clinic (min): 60 minutes    Subjective: Patient offers no new complaints  Objective: See treatment diary below      Assessment: Tolerated treatment well, cues for ex technique, no c/o pain with TE, added blue TB for core activation exercises  Patient exhibited good technique with therapeutic exercises and would benefit from continued PT      Plan: Continue per plan of care        Precautions: CAD, HTN  Precautions:     Daily Treatment Diary     Manual                                                                                   Exercise Diary  7/28 8/3 8          Water walking 5 10 10          Postural training             Gait training             Home exercise pgm/patient education             Wall: t/h raises 1 1 1          Hip abd/add 2 2 2           1 1 1          squats 1 1 1          Knee flex/ext  1 1          Step-ups (fwd/bkwd/ss)             SLS (eyes open/closed)             SLS w UE mvmt  AROM/ball toss             BACK extensions             UE Noodle work x 4  4 4 4          UE AROM             Resistive UE work (paddles, bells, TB)   2'  Blue  R,E          Core work on noodle (sitting/stdg)  Core press  10x 10x          Sit on noodle with movement             Seated on pool bench w proper posture  1           Ankle df/pf  1 1          marching  1 1          Hip Ab/add  1 1          Knee flex/ext  1 1          Deep water mvmt 5 5 5          Deep water tx/stretching 5 10 10          Specific self - stretches wall/steps 5 5 5              Modalities   8/3 8/6          whirlpool 5 10 10

## 2021-08-10 NOTE — TELEPHONE ENCOUNTER
LMOM to CB, CB# provided  Follow up with Vascular was changed to 8/24  Need to advise pt must wait until then to schedule and still need approval to hold Plavix if pt is still taking which is not clear

## 2021-08-11 ENCOUNTER — OFFICE VISIT (OUTPATIENT)
Dept: PHYSICAL THERAPY | Age: 71
End: 2021-08-11
Payer: MEDICARE

## 2021-08-11 DIAGNOSIS — M54.40 CHRONIC LOW BACK PAIN WITH SCIATICA, SCIATICA LATERALITY UNSPECIFIED, UNSPECIFIED BACK PAIN LATERALITY: Primary | ICD-10-CM

## 2021-08-11 DIAGNOSIS — G89.29 CHRONIC LOW BACK PAIN WITH SCIATICA, SCIATICA LATERALITY UNSPECIFIED, UNSPECIFIED BACK PAIN LATERALITY: Primary | ICD-10-CM

## 2021-08-11 PROCEDURE — 97113 AQUATIC THERAPY/EXERCISES: CPT

## 2021-08-11 NOTE — PROGRESS NOTES
Daily Note     Today's date: 2021  Patient name: Lorrin Buerger  : 1950  MRN: [id number]  Referring provider: Archie Galdamez DO  Dx:   Encounter Diagnosis     ICD-10-CM    1  Chronic low back pain with sciatica, sciatica laterality unspecified, unspecified back pain laterality  M54 40     G89 29        Start Time: 1000  Stop Time: 1100  Total time in clinic (min): 60 minutes    Subjective: patient offers no new complaints  Objective: See treatment diary below  1:1 38 minutes with therapist      Assessment: Tolerated treatment well, no c/o pain with TE, good movements in the water, cues for  Ex technique and posture  Patient exhibited good technique with therapeutic exercises and would benefit from continued PT      Plan: Continue per plan of care        Precautions: CAD, HTN      Daily Treatment Diary     Manual                                                                                   Exercise Diary  7/28 8/3 8/6 8/11         Water walking 5 10 10 10         Postural training             Gait training             Home exercise pgm/patient education             Wall: t/h raises 1 1 1 1         Hip abd/add 2 2 2 2         Marching 1 1 1 1         squats 1 1 1 1         Knee flex/ext  1 1 1         Step-ups (fwd/bkwd/ss)             SLS (eyes open/closed)             SLS w UE mvmt  AROM/ball toss             BACK extensions             UE Noodle work x 4  4 4 4 4         UE AROM             Resistive UE work (paddles, bells, TB)   2'  Blue  R,E 2'         Core work on noodle (sitting/stdg)  Core press  10x 10x 1'         Sit on noodle with movement             Seated on pool bench w proper posture  1           Ankle df/pf  1 1 1         marching  1 1 1         Hip Ab/add  1 1 1         Knee flex/ext  1 1 1         Deep water mvmt 5 5 5 5         Deep water tx/stretching 5 10 10 10         Specific self - stretches wall/steps 5 5 5              Modalities   8/3 8/6 8/11         whirlpool 5 10 10 10

## 2021-08-13 ENCOUNTER — OFFICE VISIT (OUTPATIENT)
Dept: PHYSICAL THERAPY | Age: 71
End: 2021-08-13
Payer: MEDICARE

## 2021-08-13 DIAGNOSIS — M54.40 CHRONIC LOW BACK PAIN WITH SCIATICA, SCIATICA LATERALITY UNSPECIFIED, UNSPECIFIED BACK PAIN LATERALITY: Primary | ICD-10-CM

## 2021-08-13 DIAGNOSIS — G89.29 CHRONIC LOW BACK PAIN WITH SCIATICA, SCIATICA LATERALITY UNSPECIFIED, UNSPECIFIED BACK PAIN LATERALITY: Primary | ICD-10-CM

## 2021-08-13 PROCEDURE — 97113 AQUATIC THERAPY/EXERCISES: CPT

## 2021-08-13 NOTE — PROGRESS NOTES
Daily Note     Today's date: 2021  Patient name: Kaila Ch  : 1950  MRN: [id number]  Referring provider: Clary Lovelace DO  Dx:   Encounter Diagnosis     ICD-10-CM    1  Chronic low back pain with sciatica, sciatica laterality unspecified, unspecified back pain laterality  M54 40     G89 29        Start Time: 1000  Stop Time: 1100  Total time in clinic (min): 60 minutes    Subjective: Patient reports getting better with therapy  C/o R LBP today  Objective: See treatment diary below  1:1 30 minutes with therapist      Assessment: Tolerated treatment well, no c/o pain with TE, cues needed for ex technique, posture and core activation  Patient exhibited good technique with therapeutic exercises and would benefit from continued PT      Plan: Continue per plan of care        Precautions: CAD, HTN      Daily Treatment Diary     Manual                                                                                   Exercise Diary  7/28 8/3 8/6 8/11 8/13        Water walking 5 10 10 10 10        Postural training             Gait training             Home exercise pgm/patient education             Wall: t/h raises 1 1 1 1 1        Hip abd/add 2 2 2 2 2        Marching 1 1 1 1 1        squats 1 1 1 1 1        Knee flex/ext  1 1 1 1        Step-ups (fwd/bkwd/ss)             SLS (eyes open/closed)             SLS w UE mvmt  AROM/ball toss             BACK extensions             UE Noodle work x 4  4 4 4 4 4        UE AROM             Resistive UE work (paddles, bells, TB)   2'  Blue  R,E 2' 2        Core work on noodle (sitting/stdg)  Core press  10x 10x 1' 1        Sit on noodle with movement             Seated on pool bench w proper posture  1           Ankle df/pf  1 1 1 1        marching  1 1 1 1        Hip Ab/add  1 1 1 1        Knee flex/ext  1 1 1 1        Deep water mvmt 5 5 5 5 5        Deep water tx/stretching 5 10 10 10 10        Specific self - stretches wall/steps 5 5 5  5 Modalities   8/3 8/6 8/11 8/13        whirlpool 5 10 10 10 10

## 2021-08-18 ENCOUNTER — OFFICE VISIT (OUTPATIENT)
Dept: PHYSICAL THERAPY | Age: 71
End: 2021-08-18
Payer: MEDICARE

## 2021-08-18 DIAGNOSIS — M54.40 CHRONIC LOW BACK PAIN WITH SCIATICA, SCIATICA LATERALITY UNSPECIFIED, UNSPECIFIED BACK PAIN LATERALITY: Primary | ICD-10-CM

## 2021-08-18 DIAGNOSIS — G89.29 CHRONIC LOW BACK PAIN WITH SCIATICA, SCIATICA LATERALITY UNSPECIFIED, UNSPECIFIED BACK PAIN LATERALITY: Primary | ICD-10-CM

## 2021-08-18 PROCEDURE — 97113 AQUATIC THERAPY/EXERCISES: CPT

## 2021-08-18 NOTE — PROGRESS NOTES
Daily Note     Today's date: 2021  Patient name: Jax Alvarez  : 1950  MRN: [id number]  Referring provider: Sherita Bocanegra DO  Dx:   Encounter Diagnosis     ICD-10-CM    1  Chronic low back pain with sciatica, sciatica laterality unspecified, unspecified back pain laterality  M54 40     G89 29        Start Time: 1000  Stop Time: 1100  Total time in clinic (min): 60 minutes    Subjective: pt notes back is still sore  Objective: See treatment diary below  Pt seen 1:1 for 30 minutes    Assessment: Tolerated treatment well  Added ankle weights for DWTX today w/ good relief  Patient would benefit from continued PT      Plan: Continue per plan of care        Precautions: CAD, HTN      Daily Treatment Diary     Manual                                                                                   Exercise Diary  7/28 8/3 8/6 8/11 8/13 8/18       Water walking 5 10 10 10 10 10       Postural training             Gait training             Home exercise pgm/patient education             Wall: t/h raises 1 1 1 1 1 1       Hip abd/add 2 2 2 2 2 2       Marching 1 1 1 1 1 1       squats 1 1 1 1 1 1       Knee flex/ext  1 1 1 1 1       Step-ups (fwd/bkwd/ss)             SLS (eyes open/closed)             SLS w UE mvmt  AROM/ball toss             BACK extensions             UE Noodle work x 4  4 4 4 4 4 4       UE AROM             Resistive UE work (paddles, bells, TB)   2'  Blue  R,E 2' 2 2' Green       Core work on noodle (sitting/stdg)  Core press  10x 10x 1' 1 x10 red       Sit on noodle with movement             Seated on pool bench w proper posture  1           Ankle df/pf  1 1 1 1 1       marching  1 1 1 1 1       Hip Ab/add  1 1 1 1 1       Knee flex/ext  1 1 1 1 1       Deep water mvmt 5 5 5 5 5 5       Deep water tx/stretching 5 10 10 10 10 10 5#       Specific self - stretches wall/steps 5 5 5  5 3           Modalities   8/3 8/6 8/11 8/13 8/18       whirlpool 5 10 10 10 10 10

## 2021-08-19 ENCOUNTER — TELEPHONE (OUTPATIENT)
Dept: NEPHROLOGY | Facility: CLINIC | Age: 71
End: 2021-08-19

## 2021-08-20 ENCOUNTER — OFFICE VISIT (OUTPATIENT)
Dept: NEPHROLOGY | Facility: CLINIC | Age: 71
End: 2021-08-20
Payer: MEDICARE

## 2021-08-20 VITALS
SYSTOLIC BLOOD PRESSURE: 140 MMHG | RESPIRATION RATE: 16 BRPM | HEART RATE: 68 BPM | HEIGHT: 70 IN | BODY MASS INDEX: 26 KG/M2 | TEMPERATURE: 98 F | DIASTOLIC BLOOD PRESSURE: 70 MMHG | WEIGHT: 181.6 LBS

## 2021-08-20 DIAGNOSIS — M89.9 CHRONIC KIDNEY DISEASE-MINERAL AND BONE DISORDER: ICD-10-CM

## 2021-08-20 DIAGNOSIS — N18.9 CHRONIC KIDNEY DISEASE-MINERAL AND BONE DISORDER: ICD-10-CM

## 2021-08-20 DIAGNOSIS — I65.23 ASYMPTOMATIC BILATERAL CAROTID ARTERY STENOSIS: ICD-10-CM

## 2021-08-20 DIAGNOSIS — I10 ESSENTIAL HYPERTENSION: ICD-10-CM

## 2021-08-20 DIAGNOSIS — E83.9 CHRONIC KIDNEY DISEASE-MINERAL AND BONE DISORDER: ICD-10-CM

## 2021-08-20 DIAGNOSIS — N18.31 STAGE 3A CHRONIC KIDNEY DISEASE (HCC): Primary | ICD-10-CM

## 2021-08-20 PROCEDURE — 99214 OFFICE O/P EST MOD 30 MIN: CPT | Performed by: INTERNAL MEDICINE

## 2021-08-20 NOTE — PROGRESS NOTES
NEPHROLOGY OFFICE FOLLOW UP  Lorrin Buerger 79 y o  male MRN: [id number]    Encounter: [id number] 8/20/2021    REASON FOR VISIT: Lorrin Buerger is a 79 y o  male who is here on 8/20/2021 for Chronic Kidney Disease and Follow-up    HPI:     Jennifer Rosen came in today for follow-up of CKD stage 3  [de-identified] old gentleman who is overall doing quite well  Does have bed vascular disease with coronary artery disease as well as carotid artery stenosis     Patient is doing well no acute complaint     Denies any chest pain palpitation or shortness of breath     Does have back pain but  Denies taking nonsteroidal pain killer     Denies any urinary complaint      REVIEW OF SYSTEMS:    Review of Systems   Constitutional: Negative for activity change and fatigue  HENT: Negative for congestion and ear discharge  Eyes: Negative for photophobia and pain  Respiratory: Negative for apnea and choking  Cardiovascular: Negative for chest pain and palpitations  Gastrointestinal: Negative for abdominal distention and blood in stool  Endocrine: Negative for heat intolerance and polyphagia  Genitourinary: Negative for flank pain and urgency  Musculoskeletal: Positive for back pain  Negative for neck pain and neck stiffness  Skin: Negative for color change and wound  Allergic/Immunologic: Negative for food allergies and immunocompromised state  Neurological: Negative for seizures and facial asymmetry  Hematological: Negative for adenopathy  Does not bruise/bleed easily  Psychiatric/Behavioral: Negative for self-injury and suicidal ideas           PAST MEDICAL HISTORY:  Past Medical History:   Diagnosis Date    Bilateral carotid artery stenosis     Cerebral arteriovenous malformation (AVM)     Chronic kidney disease     Coronary artery disease     High blood pressure     Hyperlipidemia     Hypertension     PVD (peripheral vascular disease) with claudication     Vertebral artery occlusion, left        PAST SURGICAL HISTORY:  Past Surgical History:   Procedure Laterality Date    CARDIAC CATHETERIZATION  02/08/2021    Mid LAD eccentric 50-60% stenosis, distal LAD 50% stenosis, apical LAD very small caliber vessel with discrete 90% stenosis after it wraps around apex, proximal ramus 40% stenosis, Co-dominant RCA has proximal aneurysm followed by distal 50% stenosis, RPLA is small to medium caliber vessel will discrete 70% stenosis    If patient remains symptomatic even after medication optimization then considera    COLONOSCOPY      IR CAROTID STENT  4/1/2021    LITHOTRIPSY      SD TCAT IV STENT CRV CRTD ART EMBOLIC PROTECJ Right 8/2/9767    Procedure: ANGIOPLASTY ARTERY CAROTID W/ STENT TCAR [address];  Surgeon: Verneita Boast, MD;  Location: [facility];  Service: Vascular    SKIN BIOPSY         SOCIAL HISTORY:  Social History     Substance and Sexual Activity   Alcohol Use Yes    Alcohol/week: 1 0 standard drinks    Types: 1 Cans of beer per week     Social History     Substance and Sexual Activity   Drug Use Never     Social History     Tobacco Use   Smoking Status Current Every Day Smoker    Packs/day: 6 00    Types: Cigarettes   Smokeless Tobacco Never Used       FAMILY HISTORY:  Family History   Problem Relation Age of Onset    Stroke Mother     Heart disease Mother     Stroke Father     Lung cancer Father     No Known Problems Brother     No Known Problems Maternal Grandmother     No Known Problems Maternal Grandfather     No Known Problems Paternal Grandmother     No Known Problems Paternal Grandfather     No Known Problems Daughter     No Known Problems Son     No Known Problems Son        MEDICATIONS:    Current Outpatient Medications:     amLODIPine (NORVASC) 5 mg tablet, Take 1 tablet (5 mg total) by mouth daily, Disp: , Rfl: 0    aspirin (ECOTRIN LOW STRENGTH) 81 mg EC tablet, Take 81 mg by mouth daily, Disp: , Rfl:     atorvastatin (LIPITOR) 80 mg tablet, Take 1 tablet (80 mg total) by mouth daily, Disp: , Rfl:     clopidogrel (PLAVIX) 75 mg tablet, Take 1 tablet (75 mg total) by mouth daily, Disp: 90 tablet, Rfl: 1    lisinopril (ZESTRIL) 20 mg tablet, Take 1 tablet (20 mg total) by mouth daily, Disp: , Rfl:     tiZANidine (ZANAFLEX) 4 mg tablet, Take 1 tablet (4 mg total) by mouth 2 (two) times a day as needed for muscle spasms, Disp: 60 tablet, Rfl: 1    PHYSICAL EXAM:  Vitals:    08/20/21 1021   BP: 140/70   BP Location: Right arm   Patient Position: Sitting   Pulse: 68   Resp: 16   Temp: 98 °F (36 7 °C)   TempSrc: Temporal   Weight: 82 4 kg (181 lb 9 6 oz)   Height: 5' 10" (1 778 m)     Body mass index is 26 06 kg/m²  Physical Exam  Constitutional:       General: He is not in acute distress  Appearance: Normal appearance  He is well-developed  HENT:      Head: Normocephalic and atraumatic  Eyes:      General: No scleral icterus  Conjunctiva/sclera: Conjunctivae normal    Neck:      Vascular: No JVD  Cardiovascular:      Rate and Rhythm: Normal rate  Heart sounds: Normal heart sounds  Pulmonary:      Effort: Pulmonary effort is normal       Breath sounds: No wheezing  Abdominal:      Palpations: Abdomen is soft  Tenderness: There is no abdominal tenderness  Musculoskeletal:         General: No swelling  Normal range of motion  Cervical back: Neck supple  Skin:     General: Skin is warm  Findings: No rash  Neurological:      General: No focal deficit present  Mental Status: He is alert and oriented to person, place, and time  Psychiatric:         Behavior: Behavior normal          LAB RESULTS:  Results for orders placed or performed in visit on 07/29/21   Phosphorus   Result Value Ref Range    Phosphorus 3 0 2 3 - 4 1 mg/dL   CBC and differential   Result Value Ref Range    WBC 7 20 4  31 - 10 16 Thousand/uL    RBC 5 27 3 88 - 5 62 Million/uL    Hemoglobin 16 1 12 0 - 17 0 g/dL    Hematocrit 50 4 (H) 36 5 - 49 3 %    MCV 96 82 - 98 fL    MCH 30 6 26 8 - 34 3 pg    MCHC 31 9 31 4 - 37 4 g/dL    RDW 13 0 11 6 - 15 1 %    MPV 11 1 8 9 - 12 7 fL    Platelets 339 751 - 716 Thousands/uL    nRBC 0 /100 WBCs    Neutrophils Relative 60 43 - 75 %    Immat GRANS % 0 0 - 2 %    Lymphocytes Relative 21 14 - 44 %    Monocytes Relative 8 4 - 12 %    Eosinophils Relative 10 (H) 0 - 6 %    Basophils Relative 1 0 - 1 %    Neutrophils Absolute 4 28 1 85 - 7 62 Thousands/µL    Immature Grans Absolute 0 02 0 00 - 0 20 Thousand/uL    Lymphocytes Absolute 1 54 0 60 - 4 47 Thousands/µL    Monocytes Absolute 0 56 0 17 - 1 22 Thousand/µL    Eosinophils Absolute 0 70 (H) 0 00 - 0 61 Thousand/µL    Basophils Absolute 0 10 0 00 - 0 10 Thousands/µL   Protein / creatinine ratio, urine   Result Value Ref Range    Creatinine, Ur 148 0 mg/dL    Protein Urine Random 13 mg/dL    Prot/Creat Ratio, Ur 0 09 0 00 - 0 10   PTH, intact   Result Value Ref Range    PTH 30 4 18 4 - 80 1 pg/mL   UA (URINE) with reflex to Scope   Result Value Ref Range    Color, UA Yellow     Clarity, UA Clear     Specific Gravity, UA 1 020 1 003 - 1 030    pH, UA 6 0 4 5, 5 0, 5 5, 6 0, 6 5, 7 0, 7 5, 8 0    Leukocytes, UA Negative Negative    Nitrite, UA Negative Negative    Protein, UA Negative Negative mg/dl    Glucose, UA Negative Negative mg/dl    Ketones, UA Negative Negative mg/dl    Urobilinogen, UA 1 0 0 2, 1 0 E U /dl E U /dl    Bilirubin, UA Negative Negative    Blood, UA Trace (A) Negative   Vitamin D 25 hydroxy   Result Value Ref Range    Vit D, 25-Hydroxy 15 2 (L) 30 0 - 100 0 ng/mL   Lipid panel   Result Value Ref Range    Cholesterol 113 50 - 200 mg/dL    Triglycerides 66 <=150 mg/dL    HDL, Direct 39 (L) >=40 mg/dL    LDL Calculated 61 0 - 100 mg/dL    Non-HDL-Chol (CHOL-HDL) 74 mg/dl   Comprehensive metabolic panel   Result Value Ref Range    Sodium 140 136 - 145 mmol/L    Potassium 4 0 3 5 - 5 3 mmol/L    Chloride 108 100 - 108 mmol/L    CO2 25 21 - 32 mmol/L    ANION GAP 7 4 - 13 mmol/L    BUN 19 5 - 25 mg/dL    Creatinine 1 25 0 60 - 1 30 mg/dL    Glucose, Fasting 94 65 - 99 mg/dL    Calcium 9 2 8 3 - 10 1 mg/dL    AST 18 5 - 45 U/L    ALT 29 12 - 78 U/L    Alkaline Phosphatase 105 46 - 116 U/L    Total Protein 7 3 6 4 - 8 2 g/dL    Albumin 3 8 3 5 - 5 0 g/dL    Total Bilirubin 0 70 0 20 - 1 00 mg/dL    eGFR 58 ml/min/1 73sq m   Uric acid   Result Value Ref Range    Uric Acid 3 7 (L) 4 2 - 8 0 mg/dL   PSA, Total Screen   Result Value Ref Range    PSA 4 6 (H) 0 0 - 4 0 ng/mL   TSH, 3rd generation   Result Value Ref Range    TSH 3RD GENERATON 1 310 0 358 - 3 740 uIU/mL   T3   Result Value Ref Range    T3, Total 0 80 0 60 - 1 80 ng/mL   T4   Result Value Ref Range    T4 TOTAL 7 0 4 7 - 13 3 ug/dL   Urine Microscopic   Result Value Ref Range    RBC, UA None Seen None Seen, 2-4 /hpf    WBC, UA None Seen None Seen, 2-4, 5-60 /hpf    Epithelial Cells None Seen None Seen, Occasional /hpf    Bacteria, UA None Seen None Seen, Occasional /hpf    Hyaline Casts, UA None Seen None Seen /lpf       ASSESSMENT and PLAN:      Stage 3a chronic kidney disease (HCC)  Lab Results   Component Value Date    EGFR 58 07/29/2021    EGFR 60 04/02/2021    EGFR 48 03/24/2021    CREATININE 1 25 07/29/2021    CREATININE 1 21 04/02/2021    CREATININE 1 45 (H) 03/24/2021    patient renal function is stable overall with some fluctuation  I discuss pathophysiology with him at length  Advised hydration and avoiding any nephrotoxic medicine  Patient is on ACE-inhibitor which I will continue    Asymptomatic and progressive nature discussed with him    Chronic kidney disease-mineral and bone disorder  Lab Results   Component Value Date    EGFR 58 07/29/2021    EGFR 60 04/02/2021    EGFR 48 03/24/2021    CREATININE 1 25 07/29/2021    CREATININE 1 21 04/02/2021    CREATININE 1 45 (H) 03/24/2021    PTH and phosphorus along with vitamin-D are within acceptable range and will continue to monitor    Essential hypertension   Very well controlled with present medication which include ACE-inhibitor    Asymptomatic bilateral carotid artery stenosis   Patient may get CTA as part of the workup  Will advised hydration before procedure       everything discussed the patient and his wife  I will see him back in 6 months will get blood and urine test before that visit        Portions of the record may have been created with voice recognition software  Occasional wrong word or "sound a like" substitutions may have occurred due to the inherent limitations of voice recognition software  Read the chart carefully and recognize, using context, where substitutions have occurred  If you have any questions, please contact the dictating provider

## 2021-08-20 NOTE — ASSESSMENT & PLAN NOTE
Lab Results   Component Value Date    EGFR 58 07/29/2021    EGFR 60 04/02/2021    EGFR 48 03/24/2021    CREATININE 1 25 07/29/2021    CREATININE 1 21 04/02/2021    CREATININE 1 45 (H) 03/24/2021    patient renal function is stable overall with some fluctuation  I discuss pathophysiology with him at length  Advised hydration and avoiding any nephrotoxic medicine  Patient is on ACE-inhibitor which I will continue    Asymptomatic and progressive nature discussed with him

## 2021-08-20 NOTE — ASSESSMENT & PLAN NOTE
Lab Results   Component Value Date    EGFR 58 07/29/2021    EGFR 60 04/02/2021    EGFR 48 03/24/2021    CREATININE 1 25 07/29/2021    CREATININE 1 21 04/02/2021    CREATININE 1 45 (H) 03/24/2021    PTH and phosphorus along with vitamin-D are within acceptable range and will continue to monitor

## 2021-08-20 NOTE — PATIENT INSTRUCTIONS
Chronic Kidney Disease   AMBULATORY CARE:   Chronic kidney disease (CKD)  is the gradual and permanent loss of kidney function  Normally, the kidneys remove fluid, chemicals, and waste from your blood  These wastes are turned into urine by your kidneys  CKD may worsen over time and lead to kidney failure  Common signs and symptoms include the following:   · Changes in how often you need to urinate    · Swelling in your arms, legs, or feet    · Shortness of breath    · Fatigue or weakness    · Bad or bitter taste in your mouth    · Nausea, vomiting, or loss of appetite    Call your local emergency number (911 in the [address] [facility]) if:   · You have a seizure  · You have shortness of breath  Seek care immediately if:   · You are confused and very drowsy  Call your doctor or nephrologist if:   · You suddenly gain or lose more weight than your healthcare provider has told you is okay  · You have itchy skin or a rash  · You urinate more or less than you normally do  · You have blood in your urine  · You have nausea and are vomiting  · You have fatigue or muscle weakness  · You have hiccups that will not stop  · You have questions or concerns about your condition or care  How CKD is diagnosed:  CKD has 5 stages  Your healthcare provider will use results from the following tests to find the stage of CKD you have:  · Blood and urine tests  show how well your kidneys are working  They may also show the cause of your CKD  · Ultrasound, CT scan, or MRI  pictures may be used to check your kidneys  You may be given contrast liquid to help your kidneys show up better in the pictures  Tell the healthcare provider if you have ever had an allergic reaction to contrast liquid  Do not enter the MRI room with anything metal  Metal can cause serious injury  Tell the healthcare provider if you have any metal in or on your body      · A biopsy  is a procedure to remove a small piece of tissue from your kidney  It is done to find the cause of your CKD  Treatment  can help control signs and symptoms, and prevent a worse stage of CKD  Your care team may include specialists, such as a dietitian or a heart specialist  This depends on the stage of your CKD and if you have other health conditions to manage  Healthcare providers will work with you to create a plan based on your decisions for treatment  Your treatment plan may include any of the following:  · Medicines  may be given to decrease your blood pressure and get rid of extra fluid  You may also receive medicine to manage health conditions that may occur with CKD, such as anemia, diabetes, and heart disease  · Dialysis  is a treatment to remove chemicals and waste from your blood when your kidneys can no longer do this  · Surgery  may be needed to create an arteriovenous fistula (AVF) in your arm or insert a catheter into your abdomen  This is done so you can receive dialysis  · A kidney transplant  may be done if your CKD becomes severe  What you can do to manage CKD: Management may include making some lifestyle changes  Tell your healthcare provider if you have any concerns about being able to make changes  He or she can help you find solutions, including working with specialists  Ask for help creating a plan to break large goals into smaller steps  Your plan may include any of the following:  · Manage other health conditions  Your healthcare provider will work with you to make a care plan that meets your needs  You will be checked regularly for heart disease or other conditions that can make CKD worse, such as diabetes  Your blood pressure will be closely monitored  You will also get a target blood pressure and help making a plan to reach your target  This may include taking your blood pressure at home  · Maintain a healthy weight  Your weight and body mass index (BMI) will be checked regularly   BMI helps find if your weight is healthy for your height  Your healthcare provider will use other tests to check your muscle and protein levels  Extra weight can strain your kidneys  A low weight or low muscle mass can make you feel more tired  You may have trouble doing your daily activities  Ask your provider what a healthy weight is for you  He or she can help you create a plan to lose or gain weight safely, if needed  The plan may include keeping a food diary  This is a list of foods and liquids you have each day  Your provider will use the diary to help you make changes, if needed  Changes are based on your health and any other conditions you have, such as diabetes  · Create an exercise plan  Regular exercise can help you manage CKD, high blood pressure, and diabetes  Exercise also helps control weight  Your provider can help you create exercise goals and a plan to reach those goals  For example, your goal may be to exercise for 30 minutes in a day  Your plan can include breaking exercise into 10 minute sessions, 3 times during the day  · Create a healthy eating plan  Your provider may tell you to eat food low in potassium, phosphorus, or protein  Your provider may also recommend vitamin or mineral supplements  Do not take any supplements without talking to your provider  A dietitian can help you plan meals if needed  Ask how much liquid to drink each day and which liquids are best for you  · Limit sodium (salt) as directed  You may need to limit sodium to less than 2,300 milligrams (mg) each day  Ask your dietitian or healthcare provider how much sodium you can have each day  The amount depends on your stage of kidney disease  Table salt, canned foods, soups, salted snacks, and processed meats, like deli meats and sausage, are high in sodium  Your provider or a dietitian can show you how to read food labels for sodium  · Limit alcohol as directed  Alcohol can cause fluid retention and can affect your kidneys   Ask how much alcohol is safe for you  A drink of alcohol is 12 ounces of beer, 5 ounces of wine, or 1½ ounces of liquor  · Do not smoke  Nicotine and other chemicals in cigarettes and cigars can cause kidney damage  Ask your provider for information if you currently smoke and need help to quit  E-cigarettes or smokeless tobacco still contain nicotine  Talk to your provider before you use these products  · Ask about over-the-counter medicines  Medicines such as NSAIDs and laxatives may harm your kidneys  Some cough and cold medicines can raise your blood pressure  Always ask if a medicine is safe before you take it  · Ask about vaccines you may need  CKD can increase your risk for infections such as pneumonia, influenza, and hepatitis  Vaccines lower your risk for infection  Your healthcare provider will tell you which vaccines you need and when to get them  Follow up with your doctor or nephrologist as directed: You will need to return for tests to monitor your kidney and nerve function, and your parathyroid hormone level  Your medicines may be changed, based on certain test results  Write down your questions so you remember to ask them during your visits  © Copyright [employer] 2021 Information is for End User's use only and may not be sold, redistributed or otherwise used for commercial purposes  All illustrations and images included in CareNotes® are the copyrighted property of [employer]  or Shannon Quiñones  The above information is an  only  It is not intended as medical advice for individual conditions or treatments  Talk to your doctor, nurse or pharmacist before following any medical regimen to see if it is safe and effective for you

## 2021-08-24 ENCOUNTER — TELEPHONE (OUTPATIENT)
Dept: CARDIOLOGY CLINIC | Facility: CLINIC | Age: 71
End: 2021-08-24

## 2021-08-24 ENCOUNTER — TELEPHONE (OUTPATIENT)
Dept: PAIN MEDICINE | Facility: CLINIC | Age: 71
End: 2021-08-24

## 2021-08-24 ENCOUNTER — OFFICE VISIT (OUTPATIENT)
Dept: VASCULAR SURGERY | Facility: CLINIC | Age: 71
End: 2021-08-24
Payer: MEDICARE

## 2021-08-24 VITALS
WEIGHT: 183 LBS | DIASTOLIC BLOOD PRESSURE: 80 MMHG | HEART RATE: 62 BPM | HEIGHT: 70 IN | TEMPERATURE: 98.8 F | SYSTOLIC BLOOD PRESSURE: 110 MMHG | BODY MASS INDEX: 26.2 KG/M2

## 2021-08-24 DIAGNOSIS — N18.31 STAGE 3A CHRONIC KIDNEY DISEASE (HCC): ICD-10-CM

## 2021-08-24 DIAGNOSIS — I70.219 ATHEROSCLEROSIS OF LOWER EXTREMITY WITH CLAUDICATION (HCC): ICD-10-CM

## 2021-08-24 DIAGNOSIS — R09.89 DECREASED PULSES IN FEET: ICD-10-CM

## 2021-08-24 DIAGNOSIS — I65.23 ASYMPTOMATIC BILATERAL CAROTID ARTERY STENOSIS: Primary | ICD-10-CM

## 2021-08-24 DIAGNOSIS — I65.02 VERTEBRAL ARTERY OCCLUSION, LEFT: ICD-10-CM

## 2021-08-24 DIAGNOSIS — Q28.2 CEREBRAL ARTERIOVENOUS MALFORMATION (AVM): ICD-10-CM

## 2021-08-24 DIAGNOSIS — I10 ESSENTIAL HYPERTENSION: ICD-10-CM

## 2021-08-24 PROCEDURE — 99214 OFFICE O/P EST MOD 30 MIN: CPT | Performed by: PHYSICIAN ASSISTANT

## 2021-08-24 NOTE — PATIENT INSTRUCTIONS
Atherosclerosis of lower extremity with claudication ([facility] [address] )    Asymptomatic bilateral carotid artery stenosis  -     VAS carotid complete study; Future    Cerebral arteriovenous malformation (AVM)    Essential hypertension    Stage 3a chronic kidney disease (HCC)    Vertebral artery occlusion, left            Patient is status post right internal carotid artery stent  He has no symptoms of TIA or stroke  We reviewed the symptoms of stroke for which she should call 911  The carotid duplex study shows open stent with low/normal velocities  Continue with aspirin and atorvastatin    He is more than 3 months out from carotid intervention so from vascular standpoint we can discontinue clopidogrel/Plavix  Since he does have coronary disease, I have asked him to check with Cardiology to be sure there is no reason he needs to be on Plavix from their standpoint  He does have multivessel coronary disease but no history of coronary stenting  Continue with heart healthy low-fat, low-cholesterol low-sodium diet  Exercise as tolerated  Currently, he is limited due to right hip and back pain which shoots down the leg  He was seen by Orthopedics who recommended physical therapy/aqua therapy and pain management          Symptoms of stroke:  - Unable to speak or understand speech  - Unable to move one side of the body (arm or leg)  - Visual changes  - Call 911 for any symptoms of stroke

## 2021-08-24 NOTE — PROGRESS NOTES
Assessment/Plan:    Asymptomatic bilateral carotid artery stenosis  -     VAS carotid complete study; Future    -Asymptomatic  -CV du 8/4/21: R patent stent (86/22); L < 50% (95/31), vertebral antegrade but resistive    Plan:  Asymptomatic carotid artery stenosis  We reviewed his recent carotid artery duplex which shows that the right internal carotid artery stent is patent  The post-op velocities look good  We reviewed healthy life style changes, as well as ymptoms of stroke for which he should call 911     -Smoking cessation  -Continue with aspirin and atorvastatin  -He is out 3 months after carotid intervention so from vascular standpoint we can discontinue clopidogrel  Since he does have CAD, I asked him to check with Cardiology antiplatelet therapy and if clopidogrel can be discontinued from their standpoint  He no history of coronary stenting   -Check CV duplex in 6 months with office visit or sooner, if needed    Atherosclerosis of lower extremity with claudication ([facility] [address] )  -no current symptoms of claudication  -quite limited due to back/orthopedic complaints  -continue with asa and statin therapy  -we discussed healthy lifestyle changes but he is unable to exercise due to back problems   -check ROWENA's for completeness      Essential hypertension    Stage 3a chronic kidney disease ([facility] [address] )    Vertebral artery occlusion, left        Subjective:      Patient ID: Helena Velásquez is a 79 y o  male  Patient is here to rev CV done on 8/04/21  Patient denies TIA or stroke like symptoms  Patient is s/p R TCAR done on 4/01/21  Patient is taking ASA 81mg, atorvastatin, and Plavix  Patient currently smokes 5 cigarettes a day  HPI    Helena Velásquez is a 79 y o  male hypertension, hyperlipidemia, CAD, CKD 3a, carotid artery disease s/p R ICA stenting 4/1/2021  Patient returns for 3 month follow up after R ICA stenting and to review recent vascular testing  He has no symptoms of stroke/TIA   He denies transient visual loss, burry vision, difficulty speaking, facial numbness/weakness and arm/leg weakness  We reviewed the symptoms of stroke for which he should call 911  He continues to smoke cigarettes  Mr Chang Terry reports that he has easy bruising and he would like to get off dual antiplatelet therapy  From a vascular standpoint, he received 3 months of clopidogrel so the medication can be stump  However, he does have multivessel coronary disease so I asked him to check with his cardiologist prior to discontinuing clopidogrel  His biggest complaint is limitations due to right hip and back pain  He has shooting pain down the right leg  Which affect him both day and night  He is unable to sleep or walk due to pain  He was seen by Orthopedics who referred him for physical therapy/aqua therapy and pain management  He plans to have back injections  He has no typical symptoms of claudication  Check ROWENA's for completeness  VAS CV 8/4/2012 2021-04-01 Right ICA angioplasty with stent  Risk Factors: HTN and previous smoking (quit <1yr ago)  Clinical  Right Pressure:  124/68 mm Hg, Left Pressure:  130/72 mm Hg  FINDINGS:     Right             Impression     PSV  EDV (cm/s)  Direction of Flow  Ratio    Dist  ICA                         76          23                      0 86    Mid  ICA - Stent                  78          23                      0 89    Prox   ICA         Widely Patent   86          22                      0 98    Dist CCA                          69          14                              Mid CCA                           88          18                      0 96    Prox CCA                          92          15                              Ext Carotid                      106          11                      1 21    Prox Vert                         56          17  Antegrade                   Subclavian                       100           0                                 Left Impression  PSV  EDV (cm/s)  Direction of Flow  Ratio    Dist  ICA                      63          22                      0 65    Mid  ICA                       74          24                      0 76    Prox  ICA         1 - 49%      95          31                      0 98    Dist CCA                       83          19                              Mid CCA                        97          20                      0 93    Prox CCA                      104          22                              Ext Carotid                    95          18                      0 98    Prox Vert                      40           0  Antegrade                   Subclavian                    116           0                                      CONCLUSION:     Impression  RIGHT:  Widely patent internal carotid artery and stent  Vertebral artery flow is antegrade  There is no significant subclavian artery  disease  LEFT:  There is <50% stenosis noted in the internal carotid artery  Plaque is  heterogenous and irregular  Vertebral artery flow is antegrade but resistive  There is no significant  subclavian artery disease  Compared to previous study 12/9/2020, there is improvement on the right post  ICA stenting  Recommend repeat testing in 6 months as per protocol unless otherwise  indicated  The following portions of the patient's history were reviewed and updated as appropriate: allergies, current medications, past family history, past medical history, past social history, past surgical history and problem list     Review of Systems   Constitutional: Negative  HENT: Negative  Eyes: Negative  Respiratory: Negative  Cardiovascular: Negative  Gastrointestinal: Negative  Endocrine: Negative  Genitourinary: Negative  Musculoskeletal: Negative  Skin: Negative  Allergic/Immunologic: Negative  Neurological: Negative  Hematological: Negative  Psychiatric/Behavioral: Negative  Objective:      /80 (BP Location: Left arm, Patient Position: Sitting, Cuff Size: Standard)   Pulse 62   Temp 98 8 °F (37 1 °C) (Tympanic)   Ht 5' 10" (1 778 m)   Wt 83 kg (183 lb)   BMI 26 26 kg/m²     R neck transcervical, small incision is healed and closed  Physical Exam  Vitals and nursing note reviewed  Constitutional:       Appearance: He is well-developed  HENT:      Head: Normocephalic and atraumatic  Eyes:      Pupils: Pupils are equal, round, and reactive to light  Neck:      Thyroid: No thyromegaly  Vascular: No JVD  Trachea: Trachea normal    Cardiovascular:      Rate and Rhythm: Normal rate and regular rhythm  Pulses:           Carotid pulses are 2+ on the right side and 2+ on the left side  Radial pulses are 2+ on the right side and 2+ on the left side  Dorsalis pedis pulses are 0 on the right side and 0 on the left side  Posterior tibial pulses are 0 on the right side and 0 on the left side  Heart sounds: Normal heart sounds, S1 normal and S2 normal  No murmur heard  No friction rub  No gallop  Comments:    trace lower extremity edema     nail fungus     no open wounds     capillary refill 2-3 seconds  Pulmonary:      Effort: Pulmonary effort is normal  No accessory muscle usage or respiratory distress  Breath sounds: Normal breath sounds  No wheezing or rales  Abdominal:      General: Bowel sounds are normal  There is no distension  Palpations: Abdomen is soft  Tenderness: There is no abdominal tenderness  Musculoskeletal:         General: No deformity  Normal range of motion  Cervical back: Neck supple  Skin:     General: Skin is warm and dry  Findings: No lesion or rash  Nails: There is no clubbing  Neurological:      Mental Status: He is alert and oriented to person, place, and time  Comments: Grossly normal    Psychiatric:         Behavior: Behavior is cooperative  I have reviewed and made appropriate changes to the review of systems input by the medical assistant  Vitals:    08/24/21 1137   BP: 110/80   BP Location: Left arm   Patient Position: Sitting   Cuff Size: Standard   Pulse: 62   Temp: 98 8 °F (37 1 °C)   TempSrc: Tympanic   Weight: 83 kg (183 lb)   Height: 5' 10" (1 778 m)       Patient Active Problem List   Diagnosis    Cerebral arteriovenous malformation (AVM)    Dizziness    Asymptomatic bilateral carotid artery stenosis    Atherosclerosis of lower extremity with claudication (HCC)    Vertebral artery occlusion, left    Stage 3a chronic kidney disease (HCC)    Palpitations    CAD (coronary artery disease)    Essential hypertension    Chronic kidney disease-mineral and bone disorder       Past Surgical History:   Procedure Laterality Date    CARDIAC CATHETERIZATION  02/08/2021    Mid LAD eccentric 50-60% stenosis, distal LAD 50% stenosis, apical LAD very small caliber vessel with discrete 90% stenosis after it wraps around apex, proximal ramus 40% stenosis, Co-dominant RCA has proximal aneurysm followed by distal 50% stenosis, RPLA is small to medium caliber vessel will discrete 70% stenosis    If patient remains symptomatic even after medication optimization then considera    COLONOSCOPY      IR CAROTID STENT  4/1/2021    LITHOTRIPSY      VA TCAT IV STENT CRV CRTD ART EMBOLIC PROTECJ Right 4/8/1486    Procedure: ANGIOPLASTY ARTERY CAROTID W/ STENT TCAR [address];  Surgeon: Dallin Justice MD;  [facility]: [facility];  Service: Vascular    SKIN BIOPSY         Family History   Problem Relation Age of Onset    Stroke Mother     Heart disease Mother     Stroke Father     Lung cancer Father     No Known Problems Brother     No Known Problems Maternal Grandmother     No Known Problems Maternal Grandfather     No Known Problems Paternal Grandmother     No Known Problems Paternal Grandfather     No Known Problems Daughter     No Known Problems Son     No Known Problems Son        Social History     Socioeconomic History    Marital status: /Civil Union     Spouse name: Not on file    Number of children: Not on file    Years of education: Not on file    Highest education level: Not on file   Occupational History    Not on file   Tobacco Use    Smoking status: Current Every Day Smoker     Packs/day: 6 00     Types: Cigarettes    Smokeless tobacco: Never Used   Vaping Use    Vaping Use: Never used   Substance and Sexual Activity    Alcohol use: Yes     Alcohol/week: 1 0 standard drinks     Types: 1 Cans of beer per week    Drug use: Never    Sexual activity: Not Currently   Other Topics Concern    Not on file   Social History Narrative    Not on file     Social Determinants of Health     Financial Resource Strain:     Difficulty of Paying Living Expenses:    Food Insecurity:     Worried About Running Out of Food in the Last Year:     [address] in the Last Year:    Transportation Needs:     Lack of Transportation (Medical):  Lack of Transportation (Non-Medical):    Physical Activity:     Days of Exercise per Week:     Minutes of Exercise per Session:    Stress:     Feeling of Stress :    Social Connections:     Frequency of Communication with Friends and Family:     Frequency of Social Gatherings with Friends and Family:     Attends [employer] Services:     Active Member of Clubs or Organizations:     Attends Club or Organization Meetings:     Marital Status:    Intimate Partner Violence:     Fear of Current or Ex-Partner:     Emotionally Abused:     Physically Abused:     Sexually Abused:         Allergies   Allergen Reactions    Butalbital-Apap-Caffeine Rash     Hands swell/redness         Current Outpatient Medications:     amLODIPine (NORVASC) 5 mg tablet, Take 1 tablet (5 mg total) by mouth daily, Disp: , Rfl: 0    aspirin (ECOTRIN LOW STRENGTH) 81 mg EC tablet, Take 81 mg by mouth daily, Disp: , Rfl:     atorvastatin (LIPITOR) 80 mg tablet, Take 1 tablet (80 mg total) by mouth daily, Disp: , Rfl:     clopidogrel (PLAVIX) 75 mg tablet, Take 1 tablet (75 mg total) by mouth daily, Disp: 90 tablet, Rfl: 1    lisinopril (ZESTRIL) 20 mg tablet, Take 1 tablet (20 mg total) by mouth daily, Disp: , Rfl:     tiZANidine (ZANAFLEX) 4 mg tablet, Take 1 tablet (4 mg total) by mouth 2 (two) times a day as needed for muscle spasms, Disp: 60 tablet, Rfl: 1

## 2021-08-24 NOTE — TELEPHONE ENCOUNTER
Patient called  He saw vascular surgery today, who okayed patient to discontinue Plavix as its been three months s/p carotid stent, however, they recommended that he seek opinion from cardiology  Is it okay to discontinue Plavix from a cardiac standpoint?

## 2021-08-24 NOTE — TELEPHONE ENCOUNTER
Patient came into office and wanted to know when he can schl his procedure with Dr Meaghan Sanchez  Procedure lilli stated there is a task for procedure and awaiting decision on patients med hold  Please review and advise procedure lilli to call pt and schl procedure

## 2021-08-25 NOTE — TELEPHONE ENCOUNTER
Per Cardio task, pt ok to completely stop Plavix, will continue ASA 81mg  Will call to schedule LESI

## 2021-08-26 NOTE — TELEPHONE ENCOUNTER
Attempted to schedule LESI as pt has been completely off Plavix since 8/24  Pt states he was just given Medrol Dose Pack from his PCP which he started yesterday  How far out should pt be scheduled?    Please advise

## 2021-08-27 ENCOUNTER — OFFICE VISIT (OUTPATIENT)
Dept: PHYSICAL THERAPY | Age: 71
End: 2021-08-27
Payer: MEDICARE

## 2021-08-27 DIAGNOSIS — M54.40 CHRONIC LOW BACK PAIN WITH SCIATICA, SCIATICA LATERALITY UNSPECIFIED, UNSPECIFIED BACK PAIN LATERALITY: Primary | ICD-10-CM

## 2021-08-27 DIAGNOSIS — G89.29 CHRONIC LOW BACK PAIN WITH SCIATICA, SCIATICA LATERALITY UNSPECIFIED, UNSPECIFIED BACK PAIN LATERALITY: Primary | ICD-10-CM

## 2021-08-27 PROCEDURE — 97113 AQUATIC THERAPY/EXERCISES: CPT

## 2021-08-27 NOTE — PROGRESS NOTES
Daily Note     Today's date: 2021  Patient name: Marylin Yeboah  : 1950  MRN: [id number]  Referring provider: Heather Avila DO  Dx:   Encounter Diagnosis     ICD-10-CM    1  Chronic low back pain with sciatica, sciatica laterality unspecified, unspecified back pain laterality  M54 40     G89 29        Start Time: 0900  Stop Time: 1000  Total time in clinic (min): 60 minutes    Subjective: Patient reports having increased calf pain after his last visit, notes MD wanted him to get scan of the calf to rule out DVT but patient did not go  Notes feeling a little better but still is sore  Objective: See treatment diary below      Assessment: Tolerated treatment well, felt good in the water  No c/o pain with TE  Patient exhibited good technique with therapeutic exercises and would benefit from continued PT  1:1 30 minutes with patient    Plan: Continue per plan of care        Precautions: CAD, HTN      Daily Treatment Diary     Manual                                                                                   Exercise Diary  7/28 8/3 8/6 8/11 8/13 8/18 8/27      Water walking 5 10 10 10 10 10 10      Postural training             Gait training             Home exercise pgm/patient education             Wall: t/h raises 1 1 1 1 1 1 1      Hip abd/add 2 2 2 2 2 2 2      Marching 1 1 1 1 1 1 1      squats 1 1 1 1 1 1 1      Knee flex/ext  1 1 1 1 1 1      Step-ups (fwd/bkwd/ss)             SLS (eyes open/closed)             SLS w UE mvmt  AROM/ball toss             BACK extensions             UE Noodle work x 4  4 4 4 4 4 4 4      UE AROM             Resistive UE work (paddles, bells, TB)   2'  Blue  R,E 2' 2 2' Green 2'      Core work on noodle (sitting/stdg)  Core press  10x 10x 1' 1 x10 red 10x      Sit on noodle with movement             Seated on pool bench w proper posture  1           Ankle df/pf  1 1 1 1 1 1      marching  1 1 1 1 1 1      Hip Ab/add  1 1 1 1 1 1      Knee flex/ext  1 1 1 1 1 1      Deep water mvmt 5 5 5 5 5 5 5      Deep water tx/stretching 5 10 10 10 10 10 5# 10'      Specific self - stretches wall/steps 5 5 5  5 3 3          Modalities   8/3 8/6 8/11 8/13 8/18 8/27      whirlpool 5 10 10 10 10 10 10'

## 2021-08-28 ENCOUNTER — HOSPITAL ENCOUNTER (EMERGENCY)
Facility: HOSPITAL | Age: 71
Discharge: HOME | End: 2021-08-28
Attending: EMERGENCY MEDICINE
Payer: MEDICARE

## 2021-08-28 ENCOUNTER — APPOINTMENT (EMERGENCY)
Dept: RADIOLOGY | Facility: HOSPITAL | Age: 71
End: 2021-08-28
Payer: MEDICARE

## 2021-08-28 ENCOUNTER — APPOINTMENT (EMERGENCY)
Dept: ULTRASOUND IMAGING | Facility: HOSPITAL | Age: 71
End: 2021-08-28
Payer: MEDICARE

## 2021-08-28 VITALS
TEMPERATURE: 98.8 F | SYSTOLIC BLOOD PRESSURE: 182 MMHG | DIASTOLIC BLOOD PRESSURE: 93 MMHG | HEART RATE: 76 BPM | OXYGEN SATURATION: 97 % | RESPIRATION RATE: 17 BRPM

## 2021-08-28 DIAGNOSIS — M79.671 RIGHT FOOT PAIN: ICD-10-CM

## 2021-08-28 DIAGNOSIS — B02.9 SHINGLES: Primary | ICD-10-CM

## 2021-08-28 PROCEDURE — 99285 EMERGENCY DEPT VISIT HI MDM: CPT | Performed by: EMERGENCY MEDICINE

## 2021-08-28 PROCEDURE — 93971 EXTREMITY STUDY: CPT

## 2021-08-28 PROCEDURE — 99284 EMERGENCY DEPT VISIT MOD MDM: CPT

## 2021-08-28 PROCEDURE — 73630 X-RAY EXAM OF FOOT: CPT

## 2021-08-28 NOTE — ED PROVIDER NOTES
History  Chief Complaint   Patient presents with    Numbness     pt states to have carotid artery surgery in April and pt states to have started with right leg swelling 2 days ago  pt states the swelling subsided and now reports pain/numbness  pt states to called called PCP and stated to come to ED to be evaluated      Patient is a 70-year-old male  Back in April he had a carotid endarterectomy done  He has a history peripheral arterial disease  He has a history of lumbar radiculopathy  Patient presents to the emergency room because he had some transient swelling to his right foot and a little bit to the calf  He was sent to the emergency room to rule out DVT  He is experiencing numbness and burning  He reports a rash  The rash started about 2 weeks ago  He also is complaining of pain to the right foot at the head of the 1st metatarsal with ambulation  He denies any trauma  Prior to Admission Medications   Prescriptions Last Dose Informant Patient Reported? Taking?    amLODIPine (NORVASC) 5 mg tablet  Self Yes No   Sig: Take 1 tablet (5 mg total) by mouth daily   aspirin (ECOTRIN LOW STRENGTH) 81 mg EC tablet  Self Yes No   Sig: Take 81 mg by mouth daily   atorvastatin (LIPITOR) 80 mg tablet  Self Yes No   Sig: Take 1 tablet (80 mg total) by mouth daily   lisinopril (ZESTRIL) 20 mg tablet  Self Yes No   Sig: Take 1 tablet (20 mg total) by mouth daily   tiZANidine (ZANAFLEX) 4 mg tablet  Self No No   Sig: Take 1 tablet (4 mg total) by mouth 2 (two) times a day as needed for muscle spasms      Facility-Administered Medications: None       Past Medical History:   Diagnosis Date    Bilateral carotid artery stenosis     Cerebral arteriovenous malformation (AVM)     Chronic kidney disease     Coronary artery disease     High blood pressure     Hyperlipidemia     Hypertension     PVD (peripheral vascular disease) with claudication     Vertebral artery occlusion, left        Past Surgical History:   Procedure Laterality Date    CARDIAC CATHETERIZATION  02/08/2021    Mid LAD eccentric 50-60% stenosis, distal LAD 50% stenosis, apical LAD very small caliber vessel with discrete 90% stenosis after it wraps around apex, proximal ramus 40% stenosis, Co-dominant RCA has proximal aneurysm followed by distal 50% stenosis, RPLA is small to medium caliber vessel will discrete 70% stenosis  If patient remains symptomatic even after medication optimization then considera    COLONOSCOPY      IR CAROTID STENT  4/1/2021    LITHOTRIPSY      UT TCAT IV STENT CRV CRTD ART EMBOLIC PROTECJ Right 7/8/6647    Procedure: ANGIOPLASTY ARTERY CAROTID W/ STENT TCAR [employer] [address];  Surgeon: Renetta Lynch MD;  Location: [facility];  Service: Vascular    SKIN BIOPSY         Family History   Problem Relation Age of Onset   Simba Pimentel Stroke Mother     Heart disease Mother     Stroke Father     Lung cancer Father     No Known Problems Brother     No Known Problems Maternal Grandmother     No Known Problems Maternal Grandfather     No Known Problems Paternal Grandmother     No Known Problems Paternal Grandfather     No Known Problems Daughter     No Known Problems Son     No Known Problems Son      I have reviewed and agree with the history as documented  E-Cigarette/Vaping    E-Cigarette Use Never User      E-Cigarette/Vaping Substances    Nicotine No     THC No     CBD No     Flavoring No      Social History     Tobacco Use    Smoking status: Current Every Day Smoker     Packs/day: 6 00     Types: Cigarettes    Smokeless tobacco: Never Used   Vaping Use    Vaping Use: Never used   Substance Use Topics    Alcohol use: Yes     Alcohol/week: 1 0 standard drinks     Types: 1 Cans of beer per week    Drug use: Never       Review of Systems   Constitutional: Negative for chills and fever  HENT: Negative for rhinorrhea and sore throat  Eyes: Negative for pain, redness and visual disturbance     Respiratory: Negative for cough and shortness of breath  Cardiovascular: Positive for leg swelling  Negative for chest pain  Gastrointestinal: Negative for abdominal pain, diarrhea and vomiting  Endocrine: Negative for polydipsia and polyuria  Genitourinary: Negative for dysuria, frequency and hematuria  Musculoskeletal: Positive for back pain  Negative for neck pain  Skin: Positive for rash  Negative for wound  Allergic/Immunologic: Negative for immunocompromised state  Neurological: Positive for numbness  Negative for weakness and headaches  Psychiatric/Behavioral: Negative for hallucinations and suicidal ideas  All other systems reviewed and are negative  Physical Exam  Physical Exam  Vitals reviewed  Constitutional:       General: He is not in acute distress  HENT:      Head: Normocephalic and atraumatic  Nose: Nose normal       Mouth/Throat:      Mouth: Mucous membranes are moist    Eyes:      General:         Right eye: No discharge  Left eye: No discharge  Conjunctiva/sclera: Conjunctivae normal    Pulmonary:      Effort: Pulmonary effort is normal  No respiratory distress  Musculoskeletal:         General: Tenderness present  No swelling, deformity or signs of injury  Cervical back: Normal range of motion and neck supple  Right lower leg: No edema  Left lower leg: No edema  Comments: Patient has poorly palpable foot pulses  Foot is not pale or cool  There is rash noted to the dorsum of the foot and to the lateral aspect of the foot  Also some rash to the lateral aspect of the right knee near the popliteal space  Motor and sensory exam is normal    Skin:     General: Skin is warm and dry  Coloration: Skin is not pale  Neurological:      General: No focal deficit present  Mental Status: He is alert and oriented to person, place, and time     Psychiatric:         Mood and Affect: Mood normal          Behavior: Behavior normal          Vital Signs  ED Triage Vitals [08/28/21 1110]   Temperature Pulse Respirations Blood Pressure SpO2   98 8 °F (37 1 °C) 76 17 (!) 182/93 97 %      Temp Source Heart Rate Source Patient Position - Orthostatic VS BP Location FiO2 (%)   Oral Monitor Sitting Left arm --      Pain Score       --           Vitals:    08/28/21 1110   BP: (!) 182/93   Pulse: 76   Patient Position - Orthostatic VS: Sitting         Visual Acuity      ED Medications  Medications - No data to display    Diagnostic Studies  Results Reviewed     None                 XR foot 3+ views RIGHT   ED Interpretation by Anamaria Wood MD (08/28 1630)   No fracture or dislocation  No foreign body  VAS lower limb venous duplex study, unilateral/limited    (Results Pending)              Procedures  Procedures         ED Course               Identification of Seniors at Risk      Most Recent Value   (ISAR) Identification of Seniors at Risk   Before the illness or injury that brought you to the Emergency, did you need someone to help you on a regular basis? 0 Filed at: 08/28/2021 1114   In the last 24 hours, have you needed more help than usual?  0 Filed at: 08/28/2021 1114   Have you been hospitalized for one or more nights during the past 6 months? 0 Filed at: 08/28/2021 1114   In general, do you see well?  0 Filed at: 08/28/2021 1114   In general, do you have serious problems with your memory? 0 Filed at: 08/28/2021 1114   Do you take more than three different medications every day? 1 Filed at: 08/28/2021 1114   ISAR Score  1 Filed at: 08/28/2021 1114                    SBIRT 22yo+      Most Recent Value   SBIRT (25 yo +)   In order to provide better care to our patients, we are screening all of our patients for alcohol and drug use  Would it be okay to ask you these screening questions? Yes Filed at: 08/28/2021 1620   Initial Alcohol Screen: US AUDIT-C    1  How often do you have a drink containing alcohol?  0 Filed at: 08/28/2021 1620   2   How many drinks containing alcohol do you have on a typical day you are drinking? 0 Filed at: 08/28/2021 1620   3b  FEMALE Any Age, or MALE 65+: How often do you have 4 or more drinks on one occassion? 0 Filed at: 08/28/2021 1620   Audit-C Score  0 Filed at: 08/28/2021 1620   JON: How many times in the past year have you    Used an illegal drug or used a prescription medication for non-medical reasons? Never Filed at: 08/28/2021 1620                    MDM  Number of Diagnoses or Management Options  Diagnosis management comments: Patient has peripheral arterial disease  He had poor palpable pulses, but pulses were detected with Doppler  Ultrasound was negative for DVT  X-ray of foot was negative for fracture dislocation  No foreign body  His symptoms are most likely related to shingles  Amount and/or Complexity of Data Reviewed  Tests in the radiology section of CPT®: ordered and reviewed  Independent visualization of images, tracings, or specimens: yes        Disposition  Final diagnoses:   Shingles     Time reflects when diagnosis was documented in both MDM as applicable and the Disposition within this note     Time User Action Codes Description Comment    8/28/2021  4:46 PM Floyd Mena Add [B02 9] Shingles       ED Disposition     ED Disposition Condition Date/Time Comment    Discharge Stable Sat Aug 28, 2021  4:46 PM Alban Null discharge to home/self care  Follow-up Information     Follow up With Specialties Details Why [address], [address], DO General Practice In 1 week  [address] [address] [phone]            Patient's Medications   Discharge Prescriptions    No medications on file     No discharge procedures on file      PDMP Review       Value Time User    PDMP Reviewed  Yes 4/2/2021 11:27 AM Guanaco Brambila PA-C          ED Provider  Electronically Signed by           Martin Esposito MD  08/28/21 7421

## 2021-08-29 PROCEDURE — 93971 EXTREMITY STUDY: CPT | Performed by: SURGERY

## 2021-08-30 NOTE — TELEPHONE ENCOUNTER
S/w pt and scheduled [facility]I for 9/16/21 1130 am arrival  Gave pre procedure instructions and masking/ policy

## 2021-09-01 ENCOUNTER — OFFICE VISIT (OUTPATIENT)
Dept: PHYSICAL THERAPY | Age: 71
End: 2021-09-01
Payer: MEDICARE

## 2021-09-01 ENCOUNTER — TELEPHONE (OUTPATIENT)
Dept: NEUROSURGERY | Facility: CLINIC | Age: 71
End: 2021-09-01

## 2021-09-01 DIAGNOSIS — M54.40 CHRONIC LOW BACK PAIN WITH SCIATICA, SCIATICA LATERALITY UNSPECIFIED, UNSPECIFIED BACK PAIN LATERALITY: Primary | ICD-10-CM

## 2021-09-01 DIAGNOSIS — G89.29 CHRONIC LOW BACK PAIN WITH SCIATICA, SCIATICA LATERALITY UNSPECIFIED, UNSPECIFIED BACK PAIN LATERALITY: Primary | ICD-10-CM

## 2021-09-01 PROCEDURE — 97113 AQUATIC THERAPY/EXERCISES: CPT

## 2021-09-01 NOTE — TELEPHONE ENCOUNTER
Received a call from Jm Menard stating that his nephrologist has advised against undergoing contrast imaging  He was supposed to follow-up with [facility]  Will route message to provider to see if MRA is sufficient and contact him back to advise

## 2021-09-01 NOTE — PROGRESS NOTES
Daily Note     Today's date: 2021  Patient name: Jose Wei  : 1950  MRN: [id number]  Referring provider: Daniela Tierney DO  Dx:   Encounter Diagnosis     ICD-10-CM    1  Chronic low back pain with sciatica, sciatica laterality unspecified, unspecified back pain laterality  M54 40     G89 29        Start Time: 1000  Stop Time: 1100  Total time in clinic (min): 60 minutes    Subjective: pt notes R foot is still bothering him, he c/o numbness to the bottom of the R foot  He notes he went to ER to rule out blood clot  Pt was negative for blood clot but due to  R foot pain and R foot rash he was diagnosed w/ prior shingles per pt  Objective: See treatment diary below  Pt seen 1:1 for 30 minutes    Assessment: Tolerated treatment well  Gentle ex's in the water  Pt continued w/ pain in R ankle/ foot  Patient would benefit from continued PT      Plan: Continue per plan of care        Precautions: CAD, HTN      Daily Treatment Diary     Manual                                                                                   Exercise Diary  7/28 8/3 8/6 8/11 8/13 8/18 8/27 9/1     Water walking 5 10 10 10 10 10 10 10     Postural training             Gait training             Home exercise pgm/patient education             Wall: t/h raises 1 1 1 1 1 1 1 1     Hip abd/add 2 2 2 2 2 2 2 2      1 1 1 1 1 1 1 1     squats 1 1 1 1 1 1 1 1     Knee flex/ext  1 1 1 1 1 1 1     Step-ups (fwd/bkwd/ss)             SLS (eyes open/closed)             SLS w UE mvmt  AROM/ball toss             BACK extensions             UE Noodle work x 4  4 4 4 4 4 4 4 4     UE AROM             Resistive UE work (paddles, bells, TB)   2'  Blue  R,E 2' 2 2' Green 2' 2     Core work on noodle (sitting/stdg)  Core press  10x 10x 1' 1 x10 red 10x x10     Sit on noodle with movement             Seated on pool bench w proper posture  1           Ankle df/pf  1 1 1 1 1 1 1     marching  1 1 1 1 1 1 D/c     Hip Ab/add  1 1 1 1 1 1 D/c     Knee flex/ext  1 1 1 1 1 1 D/c     Deep water mvmt 5 5 5 5 5 5 5 6     Deep water tx/stretching 5 10 10 10 10 10 5# 10' 10     Specific self - stretches wall/steps 5 5 5  5 3 3 3         Modalities   8/3 8/6 8/11 8/13 8/18 8/27 9/1     whirlpool 5 10 10 10 10 10 10' 10

## 2021-09-02 ENCOUNTER — APPOINTMENT (OUTPATIENT)
Dept: PHYSICAL THERAPY | Age: 71
End: 2021-09-02
Payer: MEDICARE

## 2021-09-08 ENCOUNTER — EVALUATION (OUTPATIENT)
Dept: PHYSICAL THERAPY | Age: 71
End: 2021-09-08
Payer: MEDICARE

## 2021-09-08 DIAGNOSIS — M54.40 CHRONIC LOW BACK PAIN WITH SCIATICA, SCIATICA LATERALITY UNSPECIFIED, UNSPECIFIED BACK PAIN LATERALITY: Primary | ICD-10-CM

## 2021-09-08 DIAGNOSIS — G89.29 CHRONIC LOW BACK PAIN WITH SCIATICA, SCIATICA LATERALITY UNSPECIFIED, UNSPECIFIED BACK PAIN LATERALITY: Primary | ICD-10-CM

## 2021-09-08 PROCEDURE — 97113 AQUATIC THERAPY/EXERCISES: CPT | Performed by: PHYSICAL THERAPIST

## 2021-09-08 NOTE — PROGRESS NOTES
PT Re-Evaluation     Today's date: 2021  Patient name: Jose Wei  : 1950  MRN: [id number]  Referring provider: Daniela Tierney DO  Dx:   Encounter Diagnosis     ICD-10-CM    1  Chronic low back pain with sciatica, sciatica laterality unspecified, unspecified back pain laterality  M54 40     G89 29        Start Time: 1015  Stop Time: 1115  Total time in clinic (min): 60 minutes    Assessment  Assessment details: Patient demonstrates decreased pain and increased ROM and strength after 10 PT visits as well as improved FOTO scores  Patient is able to walk further now and is sleeping much better;although, he still has back pain with prolonged standing at work  Impairments: abnormal gait, abnormal muscle tone, abnormal or restricted ROM, abnormal movement, activity intolerance, impaired physical strength, lacks appropriate home exercise program, pain with function, weight-bearing intolerance, poor posture  and poor body mechanics  Understanding of Dx/Px/POC: good   Prognosis: good    Goals  ST-3 WEEKS  1  Decrease pain by 2 points on VAS at its worst MET  2  Increase ROM by > 5 deg in all deficients planes  MET  3  Increase CORE/LE by 1/2 MMT grade in all deficient planes  WORKING TOWARDS    LT-6 WEEKS  1  Patient to be independent with a/iadls especially increased walking  and standing tolerance  WORKING TOWARDS  2  Increase functional activities for leisure and home activities to previous LOF  3  Independent with HEP and/or fitness program   NEW GOAL  1   Patient will continue with aggressive strengthening exercises so that he will be able to stand at work for 8 hours pain free    Plan  Patient would benefit from: skilled physical therapy  Planned modality interventions: cryotherapy, electrical stimulation/Russian stimulation, thermotherapy: hydrocollator packs and unattended electrical stimulation  Planned therapy interventions: activity modification, behavior modification, body mechanics training, aquatic therapy, flexibility, functional ROM exercises, home exercise program, IADL retraining, joint mobilization, manual therapy, neuromuscular re-education, patient education, postural training, strengthening, stretching, therapeutic activities and therapeutic exercise  Frequency: 2x week (2-3x week)  Duration in weeks: 6  Plan of Care beginning date: 2021  Plan of Care expiration date: 10/28/2021  Treatment plan discussed with: patient        Subjective Evaluation    History of Present Illness  Date of onset: 2/3/2001  Mechanism of injury: Patient reports decreased pain and increased mobility after 10 aquatic PT visits  Patient reports pool has been beneficial as he would like to continue and progress           Recurrent probem    Quality of life: good    Pain  Current pain ratin  At best pain rating: 3  At worst pain ratin  Quality: sharp and tight  Relieving factors: heat and relaxation  Aggravating factors: standing, walking and lifting  Progression: improved    Social Support  Steps to enter house: yes  Stairs in house: yes   Lives in: multiple-level home  Lives with: spouse      Diagnostic Tests  X-ray: abnormal  MRI studies: abnormal  Treatments  Previous treatment: injection treatment, chiropractic and physical therapy  Patient Goals  Patient goals for therapy: decreased pain, increased motion, increased strength and independence with ADLs/IADLs          Objective     Static Posture     Thoracic Spine  Hyperkyphosis  Lumbar Spine   Decreased lordosis  Ankle/Foot   Ankle/Foot (Right): Pes planus  Palpation   Left   Hypertonic in the erector spinae  Right   Hypertonic in the erector spinae       Active Range of Motion   Cervical/Thoracic Spine       Thoracic    Extension:  Restriction level: moderate    Lumbar   Flexion: 40 degrees   Extension: 15 degrees   Left lateral flexion: 25 degrees       Right lateral flexion: 20 degrees     Strength/Myotome Testing     Left Hip Planes of Motion   Flexion: 5  Extension: 5  Abduction: 5  Adduction: 5    Right Hip   Planes of Motion   Flexion: 4+  Extension: 4+  Abduction: 4+  Adduction: 5    Left Knee   Flexion: 5  Extension: 4+    Right Knee   Flexion: 5  Extension: 4+    Left Ankle/Foot   Dorsiflexion: 5  Plantar flexion: 5    Right Ankle/Foot   Dorsiflexion: 4+  Plantar flexion: 5    Additional Strength Details  CORE is 4-/5    Tests     Lumbar     Left   Negative passive SLR and slump test      Right   Positive slump test    Negative passive SLR  Ambulation     Observational Gait   Decreased walking speed and stride length     Left arm swing: decreased  Right arm swing: decreased    Functional Assessment        Single Leg Stance   Left: 9 seconds  Right: 8 seconds              Precautions: CAD, HTN      Daily Treatment Diary     Manual                                                                                   Exercise Diary  7/28 8/3 8/6 8/11 8/13 8/18 8/27 9/1 9/8    Water walking 5 10 10 10 10 10 10 10 10    Postural training             Gait training             Home exercise pgm/patient education             Wall: t/h raises 1 1 1 1 1 1 1 1 1    Hip abd/add 2 2 2 2 2 2 2 2 2    Marching 1 1 1 1 1 1 1 1 1    squats 1 1 1 1 1 1 1 1 1    Knee flex/ext  1 1 1 1 1 1 1 1    Step-ups (fwd/bkwd/ss)             SLS (eyes open/closed)             SLS w UE mvmt  AROM/ball toss             BACK extensions             UE Noodle work x 4  4 4 4 4 4 4 4 4 4    UE AROM             Resistive UE work (paddles, bells, TB)   2'  Blue  R,E 2' 2 2' Green 2' 2 2    Core work on noodle (sitting/stdg)  Core press  10x 10x 1' 1 x10 red 10x x10 10x    Sit on noodle with movement             Seated on pool bench w proper posture  1           Ankle df/pf  1 1 1 1 1 1 1 1    marching  1 1 1 1 1 1 D/c     Hip Ab/add  1 1 1 1 1 1 D/c     Knee flex/ext  1 1 1 1 1 1 D/c     Deep water mvmt 5 5 5 5 5 5 5 6 5    Deep water tx/stretching 5 10 10 10 10 10 5# 10' 10 10    Specific self - stretches wall/steps 5 5 5  5 3 3 3 5          Modalities   8/3 8/6 8/11 8/13 8/18 8/27 9/1 9/8    whirlpool 5 10 10 10 10 10 10' 10 10

## 2021-09-10 ENCOUNTER — APPOINTMENT (OUTPATIENT)
Dept: PHYSICAL THERAPY | Age: 71
End: 2021-09-10
Payer: MEDICARE

## 2021-09-14 ENCOUNTER — OFFICE VISIT (OUTPATIENT)
Dept: PHYSICAL THERAPY | Age: 71
End: 2021-09-14
Payer: MEDICARE

## 2021-09-14 DIAGNOSIS — G89.29 CHRONIC LOW BACK PAIN WITH SCIATICA, SCIATICA LATERALITY UNSPECIFIED, UNSPECIFIED BACK PAIN LATERALITY: Primary | ICD-10-CM

## 2021-09-14 DIAGNOSIS — M54.40 CHRONIC LOW BACK PAIN WITH SCIATICA, SCIATICA LATERALITY UNSPECIFIED, UNSPECIFIED BACK PAIN LATERALITY: Primary | ICD-10-CM

## 2021-09-14 PROCEDURE — 97113 AQUATIC THERAPY/EXERCISES: CPT | Performed by: PHYSICAL THERAPIST

## 2021-09-14 NOTE — PROGRESS NOTES
Daily Note     Today's date: 2021  Patient name: Candido Aragon  : 1950  MRN: [id number]  Referring provider: Ron Tucker DO  Dx:   Encounter Diagnosis     ICD-10-CM    1  Chronic low back pain with sciatica, sciatica laterality unspecified, unspecified back pain laterality  M54 40     G89 29        Start Time: 1100  Stop Time: 1200  Total time in clinic (min): 60 minutes    Subjective: Patient reports decreased pain to 4/10 today      Objective: See treatment diary below      Assessment: Tolerated treatment well  Patient demonstrated fatigue post treatment, exhibited good technique with therapeutic exercises and would benefit from continued PT, decreased pain with aquatic therapy treatments      Plan: Progress treatment as tolerated         Precautions: CAD, HTN      Daily Treatment Diary     Manual                                                                                   Exercise Diary  7/28 8/3 8/6 8/11 8/13 8/18 8/27 9/1 9/8 9/14   Water walking 5 10 10 10 10 10 10 10 10 10   Postural training             Gait training             Home exercise pgm/patient education             Wall: t/h raises 1 1 1 1 1 1 1 1 1 1   Hip abd/add 2 2 2 2 2 2 2 2 2 2    1 1 1 1 1 1 1 1 1 1   squats 1 1 1 1 1 1 1 1 1 1   Knee flex/ext  1 1 1 1 1 1 1 1 1   Step-ups (fwd/bkwd/ss)             SLS (eyes open/closed)             SLS w UE mvmt  AROM/ball toss             BACK extensions             UE Noodle work x 4  4 4 4 4 4 4 4 4 4 4   UE AROM             Resistive UE work (paddles, bells, TB)   2'  Blue  R,E 2' 2 2' Green 2' 2 2 2   Core work on noodle (sitting/stdg)  Core press  10x 10x 1' 1 x10 red 10x x10 10x 10x   Sit on noodle with movement             Seated on pool bench w proper posture  1           Ankle df/pf  1 1 1 1 1 1 1 1 1     1 1 1 1 1 1 D/c     Hip Ab/add  1 1 1 1 1 1 D/c     Knee flex/ext  1 1 1 1 1 1 D/c     Deep water mvmt 5 5 5 5 5 5 5 6 5 5   Deep water tx/stretching 5 10 10 10 10 10 5# 10' 10 10 10   Specific self - stretches wall/steps 5 5 5  5 3 3 3 5   5       Modalities   8/3 8/6 8/11 8/13 8/18 8/27 9/1 9/8 9/15   whirlpool 5 10 10 10 10 10 10' 10 10 10

## 2021-09-16 ENCOUNTER — HOSPITAL ENCOUNTER (OUTPATIENT)
Dept: RADIOLOGY | Facility: CLINIC | Age: 71
Discharge: HOME | End: 2021-09-16
Attending: STUDENT IN AN ORGANIZED HEALTH CARE EDUCATION/TRAINING PROGRAM | Admitting: STUDENT IN AN ORGANIZED HEALTH CARE EDUCATION/TRAINING PROGRAM
Payer: MEDICARE

## 2021-09-16 VITALS
OXYGEN SATURATION: 99 % | RESPIRATION RATE: 20 BRPM | TEMPERATURE: 97.8 F | SYSTOLIC BLOOD PRESSURE: 153 MMHG | DIASTOLIC BLOOD PRESSURE: 85 MMHG | HEART RATE: 56 BPM

## 2021-09-16 DIAGNOSIS — M48.00 CENTRAL STENOSIS OF SPINAL CANAL: ICD-10-CM

## 2021-09-16 DIAGNOSIS — M48.061 FORAMINAL STENOSIS OF LUMBAR REGION: ICD-10-CM

## 2021-09-16 PROCEDURE — 62323 NJX INTERLAMINAR LMBR/SAC: CPT | Performed by: STUDENT IN AN ORGANIZED HEALTH CARE EDUCATION/TRAINING PROGRAM

## 2021-09-16 RX ORDER — METHYLPREDNISOLONE ACETATE 80 MG/ML
80 INJECTION, SUSPENSION INTRA-ARTICULAR; INTRALESIONAL; INTRAMUSCULAR; PARENTERAL; SOFT TISSUE ONCE
Status: COMPLETED | OUTPATIENT
Start: 2021-09-16 | End: 2021-09-16

## 2021-09-16 RX ADMIN — METHYLPREDNISOLONE ACETATE 80 MG: 80 INJECTION, SUSPENSION INTRA-ARTICULAR; INTRALESIONAL; INTRAMUSCULAR; PARENTERAL; SOFT TISSUE at 12:02

## 2021-09-16 NOTE — DISCHARGE INSTR - LAB
Epidural Steroid Injection   WHAT YOU NEED TO KNOW:   An epidural steroid injection (DAWN) is a procedure to inject steroid medicine into the epidural space  The epidural space is between your spinal cord and vertebrae  Steroids reduce inflammation and fluid buildup in your spine that may be causing pain  You may be given pain medicine along with the steroids  ACTIVITY  · Do not drive or operate machinery today  · No strenuous activity today - bending, lifting, etc   · You may resume normal activites starting tomorrow - start slowly and as tolerated  · You may shower today, but no tub baths or hot tubs  · You may have numbness for several hours from the local anesthetic  Please use caution and common sense, especially with weight-bearing activities  CARE OF THE INJECTION SITE  · If you have soreness or pain, apply ice to the area today (20 minutes on/20 minutes off)  · Starting tomorrow, you may use warm, moist heat or ice if needed  · You may have an increase or change in your discomfort for 36-48 hours after your treatment  · Apply ice and continue with any pain medication you have been prescribed  · Notify the [facility] if you have any of the following: redness, drainage, swelling, headache, stiff neck or fever above 100°F     SPECIAL INSTRUCTIONS  · Our office will contact you in approximately 7 days for a progress report  MEDICATIONS  · Continue to take all routine medications  · Our office may have instructed you to hold some medications  As no general anesthesia was used in today's procedure, you should not experience any side effects related to anesthesia  If you have a problem specifically related to your procedure, please call our office at [phone]  Problems not related to your procedure should be directed to your primary care physician

## 2021-09-17 ENCOUNTER — OFFICE VISIT (OUTPATIENT)
Dept: NEUROSURGERY | Facility: CLINIC | Age: 71
End: 2021-09-17
Payer: MEDICARE

## 2021-09-17 VITALS
HEIGHT: 70 IN | HEART RATE: 72 BPM | TEMPERATURE: 97.6 F | DIASTOLIC BLOOD PRESSURE: 82 MMHG | RESPIRATION RATE: 16 BRPM | BODY MASS INDEX: 25.48 KG/M2 | WEIGHT: 178 LBS | SYSTOLIC BLOOD PRESSURE: 132 MMHG

## 2021-09-17 DIAGNOSIS — E83.9 CHRONIC KIDNEY DISEASE-MINERAL AND BONE DISORDER: ICD-10-CM

## 2021-09-17 DIAGNOSIS — N18.9 CHRONIC KIDNEY DISEASE-MINERAL AND BONE DISORDER: ICD-10-CM

## 2021-09-17 DIAGNOSIS — Q28.2 CEREBRAL ARTERIOVENOUS MALFORMATION (AVM): Primary | ICD-10-CM

## 2021-09-17 DIAGNOSIS — I65.02 VERTEBRAL ARTERY OCCLUSION, LEFT: ICD-10-CM

## 2021-09-17 DIAGNOSIS — M89.9 CHRONIC KIDNEY DISEASE-MINERAL AND BONE DISORDER: ICD-10-CM

## 2021-09-17 DIAGNOSIS — I65.23 ASYMPTOMATIC BILATERAL CAROTID ARTERY STENOSIS: ICD-10-CM

## 2021-09-17 PROCEDURE — 99215 OFFICE O/P EST HI 40 MIN: CPT | Performed by: RADIOLOGY

## 2021-09-17 RX ORDER — SODIUM CHLORIDE 9 MG/ML
75 INJECTION, SOLUTION INTRAVENOUS CONTINUOUS
OUTPATIENT
Start: 2021-09-17

## 2021-09-17 NOTE — PROGRESS NOTES
Assessment/Plan:     Diagnoses and all orders for this visit:    Cerebral arteriovenous malformation (AVM)    Asymptomatic bilateral carotid artery stenosis    Vertebral artery occlusion, left    Chronic kidney disease-mineral and bone disorder        Discussion Summary:   Jilda Nageotte is doing well following carotid stenting  His AVM still presents a risk of rupture and may need treatment  I have recommended a cerebral angiogram to further characterize the lesion  He understands the risks and has elected to proceed  We will utilize a radial approach  He will require nephrology clearance for contrast administration  Chief Complaint: Follow-up      Patient ID: Piyush Cruz is a 79 y o  male    HPI   Mr David Angeles returns for follow-up of his cerebral AVM  No new or progressive headaches  No stroke-like symptoms  He reports his dizziness spells have significantly reduced in quantity and severity, however occurs at least once a month  No seizure-like activity  Review of Systems   Constitutional: Negative  HENT: Negative  Eyes: Positive for visual disturbance (blurry )  Respiratory: Negative  Cardiovascular: Negative  Gastrointestinal: Negative  Endocrine: Negative  Genitourinary: Negative  Musculoskeletal: Positive for back pain, gait problem (with dizziness), myalgias and neck pain  Skin: Negative  Allergic/Immunologic: Negative  Neurological: Positive for dizziness (couple episodes of dizziness, 4-5 times since april) and numbness (toes)  Negative for tremors, seizures, syncope, speech difficulty, weakness, light-headedness and headaches  Hematological: Bruises/bleeds easily (medication)  Psychiatric/Behavioral: Negative  I have personally reviewed the MA's review of systems and made changes as necessary      The following portions of the patient's history were reviewed and updated as appropriate: allergies, current medications, past family history, past medical history, past social history, past surgical history and problem list       Active Ambulatory Problems     Diagnosis Date Noted    Cerebral arteriovenous malformation (AVM) 09/10/2019    Dizziness 11/10/2020    Asymptomatic bilateral carotid artery stenosis 11/13/2020    Atherosclerosis of lower extremity with claudication ([facility] [address] ) 11/13/2020    Vertebral artery occlusion, left 11/13/2020    Stage 3a chronic kidney disease ([facility] [address] ) 12/22/2020    Palpitations 12/22/2020    CAD (coronary artery disease) 03/31/2021    Essential hypertension 05/18/2021    Chronic kidney disease-mineral and bone disorder 05/18/2021    Decreased pulses in feet 08/24/2021     Resolved Ambulatory Problems     Diagnosis Date Noted    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 ([facility] [address] ) 11/13/2020     Past Medical History:   Diagnosis Date    Bilateral carotid artery stenosis     Chronic kidney disease     Coronary artery disease     High blood pressure     Hyperlipidemia     Hypertension     PVD (peripheral vascular disease) with claudication        Past Surgical History:   Procedure Laterality Date    CARDIAC CATHETERIZATION  02/08/2021    Mid LAD eccentric 50-60% stenosis, distal LAD 50% stenosis, apical LAD very small caliber vessel with discrete 90% stenosis after it wraps around apex, proximal ramus 40% stenosis, Co-dominant RCA has proximal aneurysm followed by distal 50% stenosis, RPLA is small to medium caliber vessel will discrete 70% stenosis    If patient remains symptomatic even after medication optimization then considera    COLONOSCOPY      IR CAROTID STENT  4/1/2021    LITHOTRIPSY      ND TCAT IV STENT CRV CRTD ART EMBOLIC PROTECJ Right 6/5/4299    Procedure: ANGIOPLASTY ARTERY CAROTID W/ STENT TCAR [address];  Surgeon: Kait Keith MD;  [facility]: [facility];  Service: Vascular    SKIN BIOPSY         Current Outpatient Medications   Medication Sig Dispense Refill    amLODIPine (NORVASC) 5 mg tablet Take 1 tablet (5 mg total) by mouth daily  0    aspirin (ECOTRIN LOW STRENGTH) 81 mg EC tablet Take 81 mg by mouth daily      atorvastatin (LIPITOR) 80 mg tablet Take 1 tablet (80 mg total) by mouth daily      lisinopril (ZESTRIL) 20 mg tablet Take 1 tablet (20 mg total) by mouth daily      tiZANidine (ZANAFLEX) 4 mg tablet Take 1 tablet (4 mg total) by mouth 2 (two) times a day as needed for muscle spasms 60 tablet 1     No current facility-administered medications for this visit  Facility-Administered Medications Ordered in Other Visits   Medication Dose Route Frequency Provider Last Rate Last Admin    iohexol (OMNIPAQUE) 300 mg/mL injection 50 mL  50 mL Epidural Once Rhea Escalante MD           Vitals:    09/17/21 1103   BP: 132/82   Pulse: 72   Resp: 16   Temp: 97 6 °F (36 4 °C)         Objective:    Physical Exam  Constitutional:       Appearance: Normal appearance  HENT:      Head: Normocephalic and atraumatic  Eyes:      Extraocular Movements: Extraocular movements intact  Pupils: Pupils are equal, round, and reactive to light  Cardiovascular:      Rate and Rhythm: Normal rate and regular rhythm  Pulses: Normal pulses  Pulmonary:      Effort: Pulmonary effort is normal    Musculoskeletal:         General: No swelling or tenderness  Normal range of motion  Skin:     General: Skin is warm and dry  Neurological:      General: No focal deficit present  Mental Status: He is alert and oriented to person, place, and time  Cranial Nerves: No cranial nerve deficit  Sensory: No sensory deficit  Motor: No weakness  Psychiatric:         Mood and Affect: Mood normal          Behavior: Behavior normal          Thought Content: Thought content normal          Judgment: Judgment normal        Neurologic Exam     Mental Status   Oriented to person, place, and time  Cranial Nerves     CN III, IV, VI   Pupils are equal, round, and reactive to light

## 2021-09-23 ENCOUNTER — TELEPHONE (OUTPATIENT)
Dept: PAIN MEDICINE | Facility: CLINIC | Age: 71
End: 2021-09-23

## 2021-09-23 ENCOUNTER — OFFICE VISIT (OUTPATIENT)
Dept: PHYSICAL THERAPY | Age: 71
End: 2021-09-23
Payer: MEDICARE

## 2021-09-23 DIAGNOSIS — M54.40 CHRONIC LOW BACK PAIN WITH SCIATICA, SCIATICA LATERALITY UNSPECIFIED, UNSPECIFIED BACK PAIN LATERALITY: Primary | ICD-10-CM

## 2021-09-23 DIAGNOSIS — G89.29 CHRONIC LOW BACK PAIN WITH SCIATICA, SCIATICA LATERALITY UNSPECIFIED, UNSPECIFIED BACK PAIN LATERALITY: Primary | ICD-10-CM

## 2021-09-23 PROCEDURE — 97113 AQUATIC THERAPY/EXERCISES: CPT

## 2021-09-23 NOTE — PROGRESS NOTES
Daily Note     Today's date: 2021  Patient name: Janay Hawkins  : 1950  MRN: [id number]  Referring provider: Margaret Rangel DO  Dx:   Encounter Diagnosis     ICD-10-CM    1  Chronic low back pain with sciatica, sciatica laterality unspecified, unspecified back pain laterality  M54 40     G89 29        Start Time: 1000  Stop Time: 1100  Total time in clinic (min): 60 minutes    Subjective: Patient reports increased lbp today weather related  Objective: See treatment diary below      Assessment: Tolerated treatment well, good movements in the water, relief after session    Patient exhibited good technique with therapeutic exercises and would benefit from continued PT    1:1 30 minutes in therapy  Plan: Continue per plan of care        Precautions: CAD, HTN      Daily Treatment Diary     Manual                                                                                   Exercise Diary     Water walking 10 10 10 10 10 10 10 10 10 10   Postural training             Gait training             Home exercise pgm/patient education             Wall: t/h raises 1 1 1 1 1 1 1 1 1 1   Hip abd/add 2 2 2 2 2 2 2 2 2 2    1 1 1 1 1 1 1 1 1 1   squats 1 1 1 1 1 1 1 1 1 1   Knee flex/ext 1 1 1 1 1 1 1 1 1 1   Step-ups (fwd/bkwd/ss)             SLS (eyes open/closed)             SLS w UE mvmt  AROM/ball toss             BACK extensions             UE Noodle work x 4  4 4 4 4 4 4 4 4 4 4   UE AROM             Resistive UE work (paddles, bells, TB) 2  2'  Blue  R,E 2' 2 2' Green 2' 2 2 2   Core work on noodle (sitting/stdg) 10x Core press  10x 10x 1' 1 x10 red 10x x10 10x 10x   Sit on noodle with movement             Seated on pool bench w proper posture  1           Ankle df/pf  1 1 1 1 1 1 1 1 1   marching  1 1 1 1 1 1 D/c     Hip Ab/add  1 1 1 1 1 1 D/c     Knee flex/ext  1 1 1 1 1 1 D/c     Deep water mvmt 6 5 5 5 5 5 5 6 5 5   Deep water tx/stretching 10' 10 10 10 10 10 5# 10' 10 10 10   Specific self - stretches wall/steps 5 5 5  5 3 3 3 5   5       Modalities  9/23  8/6 8/11 8/13 8/18 8/27 9/1 9/8 9/15   whirlpool 10 10 10 10 10 10 10' 10 10 10

## 2021-09-28 ENCOUNTER — OFFICE VISIT (OUTPATIENT)
Dept: PHYSICAL THERAPY | Age: 71
End: 2021-09-28
Payer: MEDICARE

## 2021-09-28 DIAGNOSIS — M54.40 CHRONIC LOW BACK PAIN WITH SCIATICA, SCIATICA LATERALITY UNSPECIFIED, UNSPECIFIED BACK PAIN LATERALITY: Primary | ICD-10-CM

## 2021-09-28 DIAGNOSIS — G89.29 CHRONIC LOW BACK PAIN WITH SCIATICA, SCIATICA LATERALITY UNSPECIFIED, UNSPECIFIED BACK PAIN LATERALITY: Primary | ICD-10-CM

## 2021-09-28 PROCEDURE — 97113 AQUATIC THERAPY/EXERCISES: CPT | Performed by: PHYSICAL THERAPIST

## 2021-09-28 NOTE — PROGRESS NOTES
Daily Note     Today's date: 2021  Patient name: Ana Wick  : 1950  MRN: [id number]  Referring provider: Mirtha Quinonez DO  Dx:   Encounter Diagnosis     ICD-10-CM    1  Chronic low back pain with sciatica, sciatica laterality unspecified, unspecified back pain laterality  M54 40     G89 29        Start Time: 0900  Stop Time: 1000  Total time in clinic (min): 60 minutes    Subjective: Patient reports back pain is 5/10  Objective: See treatment diary below      Assessment: Tolerated treatment well  Patient demonstrated fatigue post treatment, exhibited good technique with therapeutic exercises and would benefit from continued PT, patient reports ome relief since last injection 2021      Plan: Progress treatment as tolerated         Precautions: CAD, HTN      Daily Treatment Diary     Manual                                                                                   Exercise Diary     Water walking 10 10 10 10 10 10 10 10 10 10   Postural training             Gait training             Home exercise pgm/patient education             Wall: t/h raises 1 1 1 1 1 1 1 1 1 1   Hip abd/add 2 2 2 2 2 2 2 2 2 2    1 1 1 1 1 1 1 1 1 1   squats 1 1 1 1 1 1 1 1 1 1   Knee flex/ext 1 1 1 1 1 1 1 1 1 1   Step-ups (fwd/bkwd/ss)             SLS (eyes open/closed)             SLS w UE mvmt  AROM/ball toss             BACK extensions             UE Noodle work x 4  4 4 4 4 4 4 4 4 4 4   UE AROM             Resistive UE work (paddles, bells, TB) 2 2 2'  Blue  R,E 2' 2 2' Green 2' 2 2 2   Core work on noodle (sitting/stdg) 10x Core press  10x 10x 1' 1 x10 red 10x x10 10x 10x   Sit on noodle with movement             Seated on pool bench w proper posture  1           Ankle df/pf  1 1 1 1 1 1 1 1 1   marching  1 1 1 1 1 1 D/c     Hip Ab/add  1 1 1 1 1 1 D/c     Knee flex/ext  1 1 1 1 1 1 D/c     Deep water mvmt 6 5 5 5 5 5 5 6 5 5   Deep water tx/stretching 10' 10 10 10 10 10 5# 10' 10 10 10   Specific self - stretches wall/steps 5 5 5  5 3 3 3 5   5       Modalities  9/23 9/28 8/6 8/11 8/13 8/18 8/27 9/1 9/8 9/15   whirlpool 10 10 10 10 10 10 10' 10 10 10

## 2021-10-05 ENCOUNTER — OFFICE VISIT (OUTPATIENT)
Dept: PHYSICAL THERAPY | Age: 71
End: 2021-10-05
Payer: MEDICARE

## 2021-10-05 DIAGNOSIS — M54.40 CHRONIC LOW BACK PAIN WITH SCIATICA, SCIATICA LATERALITY UNSPECIFIED, UNSPECIFIED BACK PAIN LATERALITY: Primary | ICD-10-CM

## 2021-10-05 DIAGNOSIS — G89.29 CHRONIC LOW BACK PAIN WITH SCIATICA, SCIATICA LATERALITY UNSPECIFIED, UNSPECIFIED BACK PAIN LATERALITY: Primary | ICD-10-CM

## 2021-10-05 PROCEDURE — 97150 GROUP THERAPEUTIC PROCEDURES: CPT | Performed by: PHYSICAL THERAPIST

## 2021-10-05 PROCEDURE — 97113 AQUATIC THERAPY/EXERCISES: CPT | Performed by: PHYSICAL THERAPIST

## 2021-10-12 ENCOUNTER — OFFICE VISIT (OUTPATIENT)
Dept: PHYSICAL THERAPY | Age: 71
End: 2021-10-12
Payer: MEDICARE

## 2021-10-12 DIAGNOSIS — M54.40 CHRONIC LOW BACK PAIN WITH SCIATICA, SCIATICA LATERALITY UNSPECIFIED, UNSPECIFIED BACK PAIN LATERALITY: Primary | ICD-10-CM

## 2021-10-12 DIAGNOSIS — G89.29 CHRONIC LOW BACK PAIN WITH SCIATICA, SCIATICA LATERALITY UNSPECIFIED, UNSPECIFIED BACK PAIN LATERALITY: Primary | ICD-10-CM

## 2021-10-12 PROCEDURE — 97150 GROUP THERAPEUTIC PROCEDURES: CPT

## 2021-10-12 PROCEDURE — 97113 AQUATIC THERAPY/EXERCISES: CPT

## 2021-10-19 ENCOUNTER — OFFICE VISIT (OUTPATIENT)
Dept: PHYSICAL THERAPY | Age: 71
End: 2021-10-19
Payer: MEDICARE

## 2021-10-19 DIAGNOSIS — G89.29 CHRONIC LOW BACK PAIN WITH SCIATICA, SCIATICA LATERALITY UNSPECIFIED, UNSPECIFIED BACK PAIN LATERALITY: Primary | ICD-10-CM

## 2021-10-19 DIAGNOSIS — M54.40 CHRONIC LOW BACK PAIN WITH SCIATICA, SCIATICA LATERALITY UNSPECIFIED, UNSPECIFIED BACK PAIN LATERALITY: Primary | ICD-10-CM

## 2021-10-19 PROCEDURE — 97113 AQUATIC THERAPY/EXERCISES: CPT

## 2021-11-05 ENCOUNTER — DOCUMENTATION (OUTPATIENT)
Dept: RADIOLOGY | Facility: HOSPITAL | Age: 71
End: 2021-11-05

## 2021-12-08 ENCOUNTER — TELEPHONE (OUTPATIENT)
Dept: NEUROLOGY | Facility: CLINIC | Age: 71
End: 2021-12-08

## 2021-12-27 ENCOUNTER — NURSE TRIAGE (OUTPATIENT)
Dept: OTHER | Facility: OTHER | Age: 71
End: 2021-12-27

## 2021-12-27 DIAGNOSIS — Z20.828 SARS-ASSOCIATED CORONAVIRUS EXPOSURE: Primary | ICD-10-CM

## 2021-12-28 PROCEDURE — U0005 INFEC AGEN DETEC AMPLI PROBE: HCPCS | Performed by: FAMILY MEDICINE

## 2021-12-28 PROCEDURE — U0003 INFECTIOUS AGENT DETECTION BY NUCLEIC ACID (DNA OR RNA); SEVERE ACUTE RESPIRATORY SYNDROME CORONAVIRUS 2 (SARS-COV-2) (CORONAVIRUS DISEASE [COVID-19]), AMPLIFIED PROBE TECHNIQUE, MAKING USE OF HIGH THROUGHPUT TECHNOLOGIES AS DESCRIBED BY CMS-2020-01-R: HCPCS | Performed by: FAMILY MEDICINE

## 2021-12-30 ENCOUNTER — TELEPHONE (OUTPATIENT)
Dept: OTHER | Facility: OTHER | Age: 71
End: 2021-12-30

## 2022-01-11 ENCOUNTER — NURSE TRIAGE (OUTPATIENT)
Dept: OTHER | Facility: OTHER | Age: 72
End: 2022-01-11

## 2022-01-11 DIAGNOSIS — Z20.822 EXPOSURE TO COVID-19 VIRUS: Primary | ICD-10-CM

## 2022-01-11 PROCEDURE — U0003 INFECTIOUS AGENT DETECTION BY NUCLEIC ACID (DNA OR RNA); SEVERE ACUTE RESPIRATORY SYNDROME CORONAVIRUS 2 (SARS-COV-2) (CORONAVIRUS DISEASE [COVID-19]), AMPLIFIED PROBE TECHNIQUE, MAKING USE OF HIGH THROUGHPUT TECHNOLOGIES AS DESCRIBED BY CMS-2020-01-R: HCPCS | Performed by: FAMILY MEDICINE

## 2022-01-11 PROCEDURE — U0005 INFEC AGEN DETEC AMPLI PROBE: HCPCS | Performed by: FAMILY MEDICINE

## 2022-01-11 NOTE — TELEPHONE ENCOUNTER
Reason for Disposition   [1] COVID-19 infection suspected by caller or triager AND [2] mild symptoms (cough, fever, or others) AND [3] has not gotten tested yet    Answer Assessment - Initial Assessment Questions  1  COVID-19 DIAGNOSIS: "Who made your COVID-19 diagnosis?" "Was it confirmed by a positive lab test?" If not diagnosed by a HCP, ask "Are there lots of cases (community spread) where you live?" Note: See public health department website, if unsure  Patient developed covid like symptoms since yesterday  2  COVID-19 EXPOSURE: "Was there any known exposure to COVID before the symptoms began?" CDC Definition of close contact: within 6 feet (2 meters) for a total of 15 minutes or more over a 24-hour period  Patient is exposed in his household   3  ONSET: "When did the COVID-19 symptoms start?"       Yesterday   4  WORST SYMPTOM: "What is your worst symptom?" (e g , cough, fever, shortness of breath, muscle aches)      Cough, fever,   5  COUGH: "Do you have a cough?" If Yes, ask: "How bad is the cough?"        Dry cough   6  FEVER: "Do you have a fever?" If Yes, ask: "What is your temperature, how was it measured, and when did it start?"      100 1   7  RESPIRATORY STATUS: "Describe your breathing?" (e g , shortness of breath, wheezing, unable to speak)       No SOB, patient has been having nasal congestion  8  BETTER-SAME-WORSE: "Are you getting better, staying the same or getting worse compared to yesterday?"  If getting worse, ask, "In what way?"      Same   9  HIGH RISK DISEASE: "Do you have any chronic medical problems?" (e g , asthma, heart or lung disease, weak immune system, obesity, etc )       No  10  VACCINE: "Have you gotten the COVID-19 vaccine?" If Yes ask: "Which one, how many shots, when did you get it?"        Vaccinated with Moderna   12   OTHER SYMPTOMS: "Do you have any other symptoms?"  (e g , chills, fatigue, headache, loss of smell or taste, muscle pain, sore throat; new loss of smell or taste especially support the diagnosis of COVID-19)        Headaches, fatigue      Protocols used: CORONAVIRUS ([id number]) DIAGNOSED OR SUSPECTED-ADULT-OH

## 2022-01-11 NOTE — TELEPHONE ENCOUNTER
Patient denies SOB, stated that he has been having nasal congestion  Patient agreeable to call his PCP to follow up

## 2022-01-11 NOTE — TELEPHONE ENCOUNTER
Regarding: COVID SYMPTOMATIC SOB  ----- Message from Carlie Joyce sent at 1/11/2022  1:53 PM EST -----  Patient began having symptoms after exposure to someone who tested positive  Symptoms, cough, fever, and hard for him to breath

## 2022-01-12 ENCOUNTER — TELEPHONE (OUTPATIENT)
Dept: OTHER | Facility: OTHER | Age: 72
End: 2022-01-12

## 2022-01-13 NOTE — TELEPHONE ENCOUNTER
Your test for the novel coronavirus, also known as COVID-19, was positive  The sample showed that the virus was present  Positive COVID-19 test results are reportable to the [place] Department of Health  You may receive a call from trained public health staff to conduct an interview  It is important to answer their call  They will ask you to verify who you are  During the call they will ask you about what symptoms you have, what you did before you got sick, and who you were close to while sick  The health department does this to make sure everyone stays healthy and to reduce the spread of the virus  If you would like to verify if the caller does in fact work in contact tracing, call the [address] [place] [address] at [employer] ([phone])  For additional information, please visit the [employer] website: www health [place] gov     If you have any additional questions, we can schedule a virtual visit for you with a provider or call the [facility] [phone], option 7, for care advice    For additional information, please visit the Coronavirus FAQ on the [facility] home page ([email or web address]  [email or web address])

## 2022-02-04 ENCOUNTER — TELEPHONE (OUTPATIENT)
Dept: NEPHROLOGY | Facility: CLINIC | Age: 72
End: 2022-02-04

## 2022-02-04 NOTE — TELEPHONE ENCOUNTER
I called and spoke to the patient and schedule him for his follow up appointment with Dr Aicha Cruz from the recall list  The patient appointment was schedule for 4/5/2022 with Dr Aicha Cruz  The patient understood and was okay with the day and time of next appointment   Sofía Hernandez,

## 2022-03-28 ENCOUNTER — TELEPHONE (OUTPATIENT)
Dept: NEPHROLOGY | Facility: CLINIC | Age: 72
End: 2022-03-28

## 2022-03-28 DIAGNOSIS — N18.31 STAGE 3A CHRONIC KIDNEY DISEASE (HCC): Primary | ICD-10-CM

## 2022-05-31 ENCOUNTER — TELEPHONE (OUTPATIENT)
Dept: NEPHROLOGY | Facility: CLINIC | Age: 72
End: 2022-05-31

## 2022-06-02 ENCOUNTER — OFFICE VISIT (OUTPATIENT)
Dept: PAIN MEDICINE | Facility: CLINIC | Age: 72
End: 2022-06-02
Payer: MEDICARE

## 2022-06-02 VITALS
WEIGHT: 186.8 LBS | BODY MASS INDEX: 26.74 KG/M2 | SYSTOLIC BLOOD PRESSURE: 152 MMHG | HEIGHT: 70 IN | DIASTOLIC BLOOD PRESSURE: 88 MMHG | HEART RATE: 60 BPM

## 2022-06-02 DIAGNOSIS — M62.830 LUMBAR PARASPINAL MUSCLE SPASM: ICD-10-CM

## 2022-06-02 DIAGNOSIS — M51.16 LUMBAR DISC DISEASE WITH RADICULOPATHY: ICD-10-CM

## 2022-06-02 DIAGNOSIS — G89.4 CHRONIC PAIN SYNDROME: Primary | ICD-10-CM

## 2022-06-02 DIAGNOSIS — M51.16 INTERVERTEBRAL DISC DISORDER WITH RADICULOPATHY OF LUMBAR REGION: ICD-10-CM

## 2022-06-02 PROCEDURE — 99214 OFFICE O/P EST MOD 30 MIN: CPT

## 2022-06-02 RX ORDER — CLOPIDOGREL BISULFATE 75 MG/1
75 TABLET ORAL DAILY
COMMUNITY
Start: 2022-03-24

## 2022-06-02 RX ORDER — DICLOFENAC SODIUM 75 MG/1
75 TABLET, DELAYED RELEASE ORAL 2 TIMES DAILY
Qty: 60 TABLET | Refills: 1 | Status: SHIPPED | OUTPATIENT
Start: 2022-06-02

## 2022-06-02 RX ORDER — TIZANIDINE 4 MG/1
4 TABLET ORAL 2 TIMES DAILY PRN
Qty: 60 TABLET | Refills: 1 | Status: SHIPPED | OUTPATIENT
Start: 2022-06-02

## 2022-06-02 RX ORDER — BEMPEDOIC ACID AND EZETIMIBE 180; 10 MG/1; MG/1
1 TABLET, FILM COATED ORAL
COMMUNITY
Start: 2022-03-04

## 2022-06-02 NOTE — PROGRESS NOTES
Pain Medicine Follow-Up Note    Assessment:  1  Chronic pain syndrome    2  Intervertebral disc disorder with radiculopathy of lumbar region    3  Lumbar paraspinal muscle spasm    4  Lumbar disc disease with radiculopathy        Plan:  Orders Placed This Encounter   Procedures    [place] spine and pain procedure     Dr Womack Pap     Standing Status:   Future     Standing Expiration Date:   6/2/2026     Order Specific Question:   Reason for Exam:     Answer:   L5-S1 LESI     Order Specific Question:   Anticoagulant hold needed? Answer:   No       New Medications Ordered This Visit   Medications    Nexlizet 180-10 MG TABS     Sig: Take 1 tablet by mouth daily with dinner    clopidogrel (PLAVIX) 75 mg tablet     Sig: Take 75 mg by mouth daily    tiZANidine (ZANAFLEX) 4 mg tablet     Sig: Take 1 tablet (4 mg total) by mouth 2 (two) times a day as needed for muscle spasms     Dispense:  60 tablet     Refill:  1    diclofenac (VOLTAREN) 75 mg EC tablet     Sig: Take 1 tablet (75 mg total) by mouth 2 (two) times a day     Dispense:  60 tablet     Refill:  1       My impressions and treatment recommendations were discussed in detail with the patient who verbalized understanding and had no further questions  The patient is a 29-year-old male with a history of chronic pain secondary to low back pain, central stenosis of spinal canal, lumbar herniated disc, foraminal stenosis of lumbar region and lumbar facet arthropathy who presents to the office with worsening bilateral low back pain  At this time I instructed patient we can repeat the L5-S1 LESI that was performed on 09/16/2021 as it did provide him moderate relief of his low back pain symptoms  I instructed our  will call to schedule him  Complete risks and benefits including bleeding, infection, tissue reaction, nerve injury and allergic reaction were discussed  The approach was demonstrated using models and literature was provided    Verbal and written consent was obtained  I will also prescribe him diclofenac 75 mg 1 tablet twice a day to take in hopes to provide him relief of his low back pain into we were able to get him in for an injection  I instructed patient to take this medication with food and to avoid all other NSAIDs while taking this medication  I will also reorder him the tizanidine since it did provide him mild relief  Follow-up is planned postprocedure time or sooner as warranted  Discharge instructions were provided  I personally saw and examined the patient and I agree with the above discussed plan of care  History of Present Illness:    Avel Ruiz is a 70 y o  male with a history of chronic pain secondary to low back pain, central stenosis of spinal canal, lumbar herniated disc, foraminal stenosis of lumbar region and lumbar facet arthropathy who presents to [facility] for interval re-evaluation of the above stated pain complaints  He was last seen on 09/16/2021 where he underwent a L5-S1 LESI which provided him moderate relief of his bilateral low back pain symptoms  He presents to the office with worsening bilateral low back pain  He states his pain is worse since the last office visit and constant  He rates the quality of his pain as dull/aching, sharp and shooting is currently rating it a 9/10 on a numeric scale  He is not currently taking anything over-the-counter for pain, however he was on it tizanidine 4 mg as prescribed by Orthopedics several months ago which provided him mild relief  Other than as stated above, the patient denies any interval changes in medications, medical condition, mental condition, symptoms, or allergies since the last office visit  Review of Systems:    Review of Systems   Respiratory: Negative for shortness of breath  Cardiovascular: Negative for chest pain  Gastrointestinal: Negative for constipation, diarrhea, nausea and vomiting  Musculoskeletal: Positive for arthralgias and gait problem  Negative for joint swelling and myalgias  Decreased ROM  Pain in lower back     Skin: Negative for rash  Neurological: Negative for dizziness, seizures and weakness  All other systems reviewed and are negative  Patient Active Problem List   Diagnosis    Cerebral arteriovenous malformation (AVM)    Dizziness    Asymptomatic bilateral carotid artery stenosis    Atherosclerosis of lower extremity with claudication (HCC)    Vertebral artery occlusion, left    Stage 3a chronic kidney disease (HCC)    Palpitations    CAD (coronary artery disease)    Essential hypertension    Chronic kidney disease-mineral and bone disorder    Decreased pulses in feet       Past Medical History:   Diagnosis Date    Bilateral carotid artery stenosis     Cerebral arteriovenous malformation (AVM)     Chronic kidney disease     Coronary artery disease     High blood pressure     Hyperlipidemia     Hypertension     PVD (peripheral vascular disease) with claudication     Vertebral artery occlusion, left        Past Surgical History:   Procedure Laterality Date    CARDIAC CATHETERIZATION  02/08/2021    Mid LAD eccentric 50-60% stenosis, distal LAD 50% stenosis, apical LAD very small caliber vessel with discrete 90% stenosis after it wraps around apex, proximal ramus 40% stenosis, Co-dominant RCA has proximal aneurysm followed by distal 50% stenosis, RPLA is small to medium caliber vessel will discrete 70% stenosis    If patient remains symptomatic even after medication optimization then considera    COLONOSCOPY      IR CAROTID STENT  4/1/2021    LITHOTRIPSY      NV TCAT IV STENT CRV CRTD ART EMBOLIC PROTECJ Right 2/8/3613    Procedure: ANGIOPLASTY ARTERY CAROTID W/ STENT TCAR [address];  Surgeon: Angie Rendon MD;  Location: [facility];  Service: Vascular    SKIN BIOPSY         Family History   Problem Relation Age of Onset    Stroke Mother  Heart disease Mother     Stroke Father     Lung cancer Father     No Known Problems Brother     No Known Problems Maternal Grandmother     No Known Problems Maternal Grandfather     No Known Problems Paternal Grandmother     No Known Problems Paternal Grandfather     No Known Problems Daughter     No Known Problems Son     No Known Problems Son        Social History     Occupational History    Not on file   Tobacco Use    Smoking status: Current Every Day Smoker     Types: Cigarettes    Smokeless tobacco: Never Used    Tobacco comment: 6 cigarettes a day   Vaping Use    Vaping Use: Never used   Substance and Sexual Activity    Alcohol use:  Yes     Alcohol/week: 1 0 standard drink     Types: 1 Cans of beer per week    Drug use: Never    Sexual activity: Not Currently         Current Outpatient Medications:     amLODIPine (NORVASC) 5 mg tablet, Take 1 tablet (5 mg total) by mouth daily, Disp: , Rfl: 0    aspirin (ECOTRIN LOW STRENGTH) 81 mg EC tablet, Take 81 mg by mouth daily, Disp: , Rfl:     atorvastatin (LIPITOR) 80 mg tablet, Take 1 tablet (80 mg total) by mouth daily, Disp: , Rfl:     clopidogrel (PLAVIX) 75 mg tablet, Take 75 mg by mouth daily, Disp: , Rfl:     diclofenac (VOLTAREN) 75 mg EC tablet, Take 1 tablet (75 mg total) by mouth 2 (two) times a day, Disp: 60 tablet, Rfl: 1    lisinopril (ZESTRIL) 20 mg tablet, Take 1 tablet (20 mg total) by mouth daily, Disp: , Rfl:     Nexlizet 180-10 MG TABS, Take 1 tablet by mouth daily with dinner, Disp: , Rfl:     tiZANidine (ZANAFLEX) 4 mg tablet, Take 1 tablet (4 mg total) by mouth 2 (two) times a day as needed for muscle spasms, Disp: 60 tablet, Rfl: 1    Allergies   Allergen Reactions    Butalbital-Apap-Caffeine Rash     Hands swell/redness       Physical Exam:    /88 (BP Location: Left arm, Patient Position: Sitting, Cuff Size: Standard)   Pulse 60   Ht 5' 10" (1 778 m)   Wt 84 7 kg (186 lb 12 8 oz)   BMI 26 80 kg/m² Constitutional:normal, well developed, well nourished, alert, in no distress and non-toxic and no overt pain behavior    Eyes:anicteric  HEENT:grossly intact  Neck:supple, symmetric, trachea midline and no masses   Pulmonary:even and unlabored  Cardiovascular:No edema or pitting edema present  Skin:Normal without rashes or lesions and well hydrated  Psychiatric:Mood and affect appropriate  Neurologic:Cranial Nerves II-XII grossly intact  Musculoskeletal:antalgic    Lumbar Spine Exam    Appearance:  Normal lordosis  Palpation/Tenderness:  left lumbar paraspinal tenderness  right lumbar paraspinal tenderness  Sensory:  no sensory deficits noted  Range of Motion:  Full range of motion with no pain or limitations in flexion, extension, lateral flexion and rotation  Motor Strength:  Left hip flexion:  5/5  Right hip flexion:  5/5  Left knee flexion:  5/5  Left knee extension:  5/5  Right knee flexion:  5/5  Right knee extension:  5/5  Left foot dorsiflexion:  5/5  Left foot plantar flexion:  5/5  Right foot dorsiflexion:  5/5  Right foot plantar flexion:  5/5    Imaging  FL spine and pain procedure    (Results Pending)         Orders Placed This Encounter   Procedures    FL spine and pain procedure

## 2022-06-06 ENCOUNTER — TELEPHONE (OUTPATIENT)
Dept: RADIOLOGY | Facility: CLINIC | Age: 72
End: 2022-06-06

## 2022-06-06 NOTE — TELEPHONE ENCOUNTER
Lm for pt to cb  Is patient still on plavix? If so, who prescribes it? We will need to get permission to hold for procedure

## 2022-08-04 NOTE — TELEPHONE ENCOUNTER
S/w pt's wife, she will have the patient call us back  She said she can not answer if he is on Plavix

## 2022-08-29 ENCOUNTER — TELEPHONE (OUTPATIENT)
Dept: NEPHROLOGY | Facility: CLINIC | Age: 72
End: 2022-08-29

## 2022-09-06 ENCOUNTER — TELEPHONE (OUTPATIENT)
Dept: NEPHROLOGY | Facility: CLINIC | Age: 72
End: 2022-09-06

## 2023-08-20 NOTE — ANESTHESIA PREPROCEDURE EVALUATION
Please Notify pt, Urine is negative for any infection Procedure:  ANGIOPLASTY ARTERY CAROTID W/ STENT TCAR [address] (Right Neck)    Relevant Problems   ANESTHESIA (within normal limits)   (-) History of anesthesia complications      CARDIO   (+) Asymptomatic bilateral carotid artery stenosis   (+) CAD (coronary artery disease)   (+) Cerebral arteriovenous malformation (AVM)   (+) Vertebral artery occlusion, left   (-) Chest pain   (-) ROMAN (dyspnea on exertion)      GI/HEPATIC   (-) Gastroesophageal reflux disease      /RENAL   (+) Stage 3a chronic kidney disease      HEMATOLOGY (within normal limits)      NEURO/PSYCH   (-) CVA (cerebral vascular accident) ([facility] [address] )   (-) Seizures ([facility] [address] )      PULMONARY   (-) Shortness of breath   (-) URI (upper respiratory infection)   LHC - 2/8/21  LAD - large, ectatic, mLAD 50-60% (negative iFR), dLAD 50%, apical LAD 90% - very small, RI 40%, RPLA - 70% stenosis (small-medium, appears amenable to PCI)    Nuclear stress test - 1/25/21  LVEF 57%, stress ECG - developed LBBB with discordant ST elevation >5 mm in V3 during lexiscan infusion, had moderate size fixed perfusion defect of intraseptal wall which resolved with prone imaging  Small partially reversible defect of anteroseptal wall    CTA 1/2021  Left frontal lobe arteriovenous malformation primarily extra-axial location along the left frontal calvarium and to the left of the falx  Nidus measures approximately 3 4 x 3 2 x 1 8 cm primary flow arising from branches of the left anterior cerebral   artery  This finding is known as per clinical history      Severe carotid stenosis on the right with minimal right ICA diameter at 0 9 mm  This relates with approximately 80% stenosis by NASCET criteria      Mild less than 50% left ICA stenosis      Left vertebral artery is severely stenosed at its origin and remains a small caliber vessel as it descends the neck including in its V3 segment    The intracranial vertebral artery reconstitutes via retrograde pathway from the basilar artery to the PICA   origin  Physical Exam    Airway    Mallampati score: II  TM Distance: >3 FB  Neck ROM: full     Dental   No notable dental hx     Cardiovascular      Pulmonary      Other Findings        Anesthesia Plan  ASA Score- 4     Anesthesia Type- IV sedation with anesthesia with ASA Monitors  Additional Monitors: arterial line  Airway Plan:           Plan Factors-Exercise tolerance (METS): >4 METS  Chart reviewed  EKG reviewed  Existing labs reviewed  Induction- intravenous  Postoperative Plan-     Informed Consent- Anesthetic plan and risks discussed with patient  I personally reviewed this patient with the CRNA  Discussed and agreed on the Anesthesia Plan with the CRNA  Caity Ballard

## 2025-08-14 ENCOUNTER — ANESTHESIA EVENT (OUTPATIENT)
Dept: PERIOP | Facility: HOSPITAL | Age: 75
End: 2025-08-14
Payer: MEDICARE

## 2025-08-15 ENCOUNTER — HOSPITAL ENCOUNTER (OUTPATIENT)
Facility: HOSPITAL | Age: 75
Setting detail: OUTPATIENT SURGERY
Discharge: HOME | End: 2025-08-15
Attending: SURGERY | Admitting: SURGERY
Payer: MEDICARE

## 2025-08-15 ENCOUNTER — ANESTHESIA (OUTPATIENT)
Dept: PERIOP | Facility: HOSPITAL | Age: 75
End: 2025-08-15
Payer: MEDICARE

## 2025-08-15 VITALS
RESPIRATION RATE: 18 BRPM | WEIGHT: 165 LBS | HEART RATE: 59 BPM | HEIGHT: 70 IN | SYSTOLIC BLOOD PRESSURE: 191 MMHG | BODY MASS INDEX: 23.62 KG/M2 | OXYGEN SATURATION: 97 % | TEMPERATURE: 97 F | DIASTOLIC BLOOD PRESSURE: 93 MMHG

## 2025-08-15 LAB
ALBUMIN SERPL BCG-MCNC: 3.8 G/DL (ref 3.5–5)
ALP SERPL-CCNC: 76 U/L (ref 34–104)
ALT SERPL W P-5'-P-CCNC: 12 U/L (ref 7–52)
ANION GAP SERPL CALCULATED.3IONS-SCNC: 4 MMOL/L (ref 4–13)
AST SERPL W P-5'-P-CCNC: 14 U/L (ref 13–39)
BASOPHILS # BLD AUTO: 0.08 THOUSANDS/ÂΜL (ref 0–0.1)
BASOPHILS NFR BLD AUTO: 1 % (ref 0–1)
BILIRUB SERPL-MCNC: 0.76 MG/DL (ref 0.2–1)
BUN SERPL-MCNC: 17 MG/DL (ref 5–25)
CALCIUM SERPL-MCNC: 9 MG/DL (ref 8.4–10.2)
CHLORIDE SERPL-SCNC: 108 MMOL/L (ref 96–108)
CO2 SERPL-SCNC: 26 MMOL/L (ref 21–32)
CREAT SERPL-MCNC: 1 MG/DL (ref 0.6–1.3)
EOSINOPHIL # BLD AUTO: 0.61 THOUSAND/ÂΜL (ref 0–0.61)
EOSINOPHIL NFR BLD AUTO: 8 % (ref 0–6)
ERYTHROCYTE [DISTWIDTH] IN BLOOD BY AUTOMATED COUNT: 13.8 % (ref 11.6–15.1)
GFR SERPL CREATININE-BSD FRML MDRD: 73 ML/MIN/1.73SQ M
GLUCOSE P FAST SERPL-MCNC: 117 MG/DL (ref 65–99)
GLUCOSE SERPL-MCNC: 117 MG/DL (ref 65–140)
HCT VFR BLD AUTO: 49.4 % (ref 36.5–49.3)
HGB BLD-MCNC: 16.1 G/DL (ref 12–17)
IMM GRANULOCYTES # BLD AUTO: 0.03 THOUSAND/UL (ref 0–0.2)
IMM GRANULOCYTES NFR BLD AUTO: 0 % (ref 0–2)
LIPASE SERPL-CCNC: 15 U/L (ref 11–82)
LYMPHOCYTES # BLD AUTO: 1.53 THOUSANDS/ÂΜL (ref 0.6–4.47)
LYMPHOCYTES NFR BLD AUTO: 19 % (ref 14–44)
MCH RBC QN AUTO: 30.8 PG (ref 26.8–34.3)
MCHC RBC AUTO-ENTMCNC: 32.6 G/DL (ref 31.4–37.4)
MCV RBC AUTO: 95 FL (ref 82–98)
MONOCYTES # BLD AUTO: 0.7 THOUSAND/ÂΜL (ref 0.17–1.22)
MONOCYTES NFR BLD AUTO: 9 % (ref 4–12)
NEUTROPHILS # BLD AUTO: 4.98 THOUSANDS/ÂΜL (ref 1.85–7.62)
NEUTS SEG NFR BLD AUTO: 63 % (ref 43–75)
NRBC BLD AUTO-RTO: 0 /100 WBCS
PLATELET # BLD AUTO: 236 THOUSANDS/UL (ref 149–390)
PMV BLD AUTO: 10.4 FL (ref 8.9–12.7)
POTASSIUM SERPL-SCNC: 3.7 MMOL/L (ref 3.5–5.3)
PROT SERPL-MCNC: 6.5 G/DL (ref 6.4–8.4)
RBC # BLD AUTO: 5.23 MILLION/UL (ref 3.88–5.62)
SODIUM SERPL-SCNC: 138 MMOL/L (ref 135–147)
WBC # BLD AUTO: 7.93 THOUSAND/UL (ref 4.31–10.16)

## 2025-08-15 PROCEDURE — 83690 ASSAY OF LIPASE: CPT | Performed by: SURGERY

## 2025-08-15 PROCEDURE — 80053 COMPREHEN METABOLIC PANEL: CPT | Performed by: SURGERY

## 2025-08-15 PROCEDURE — 85025 COMPLETE CBC W/AUTO DIFF WBC: CPT | Performed by: SURGERY

## 2025-08-15 RX ORDER — ACETAMINOPHEN 10 MG/ML
1000 INJECTION, SOLUTION INTRAVENOUS ONCE
Status: DISCONTINUED | OUTPATIENT
Start: 2025-08-15 | End: 2025-08-15 | Stop reason: HOSPADM

## 2025-08-15 RX ORDER — MIDAZOLAM HYDROCHLORIDE 2 MG/2ML
INJECTION, SOLUTION INTRAMUSCULAR; INTRAVENOUS CONTINUOUS PRN
Status: SHIPPED | OUTPATIENT
Start: 2025-08-15

## 2025-08-15 RX ORDER — AMLODIPINE BESYLATE 5 MG/1
5 TABLET ORAL ONCE
Status: COMPLETED | OUTPATIENT
Start: 2025-08-15 | End: 2025-08-15

## 2025-08-15 RX ORDER — FENTANYL CITRATE 50 UG/ML
INJECTION, SOLUTION INTRAMUSCULAR; INTRAVENOUS CONTINUOUS PRN
Status: SHIPPED | OUTPATIENT
Start: 2025-08-15

## 2025-08-15 RX ADMIN — AMLODIPINE BESYLATE 5 MG: 5 TABLET ORAL at 06:59

## (undated) DEVICE — GLOVE INDICATOR PI UNDERGLOVE SZ 8 BLUE

## (undated) DEVICE — INTRODUCER KIT MICO 4FR 21G X 7CM

## (undated) DEVICE — SNAP KOVER: Brand: UNBRANDED

## (undated) DEVICE — ANGLED-TIP ARTERIAL SHEATH CONFIGURATION: Brand: ENROUTE TRANSCAROTID NEUROPROTECTION SYSTEM

## (undated) DEVICE — SURGICEL FIBRILLAR 1 X 2

## (undated) DEVICE — BETHL CAROTID ENDARTERECTOMY: Brand: CARDINAL HEALTH

## (undated) DEVICE — THYROID SHEET: Brand: CONVERTORS

## (undated) DEVICE — COVER PROBE INTRAOPERATIVE 6 X 96 IN

## (undated) DEVICE — MICROPUNCTURE 501

## (undated) DEVICE — SUT MONOCRYL 4-0 PS-2 18 IN Y496G

## (undated) DEVICE — 1200CC GUARDIAN II: Brand: GUARDIAN

## (undated) DEVICE — DRAPE SURGIKIT SADDLE BAG

## (undated) DEVICE — LIGACLIP MCA MULTIPLE CLIP APPLIERS, 20 MEDIUM CLIPS: Brand: LIGACLIP

## (undated) DEVICE — SUT PROLENE 5-0 BV-1 24 IN 9702H

## (undated) DEVICE — CHLORAPREP HI-LITE 26ML ORANGE

## (undated) DEVICE — PRESTO™ INFLATION DEVICE: Brand: PRESTO

## (undated) DEVICE — ADHESIVE SKIN HIGH VISCOSITY EXOFIN 1ML

## (undated) DEVICE — INTENDED FOR TISSUE SEPARATION, AND OTHER PROCEDURES THAT REQUIRE A SHARP SURGICAL BLADE TO PUNCTURE OR CUT.: Brand: BARD-PARKER SAFETY BLADES SIZE 15, STERILE

## (undated) DEVICE — SUT MONOCRYL 3-0 SH 27 IN Y416H

## (undated) DEVICE — FLUID MANAGEMENT KIT - IR

## (undated) DEVICE — RADIFOCUS TORQUE DEVICE MULTI-TORQUE VISE: Brand: RADIFOCUS TORQUE DEVICE

## (undated) DEVICE — INTENDED FOR TISSUE SEPARATION, AND OTHER PROCEDURES THAT REQUIRE A SHARP SURGICAL BLADE TO PUNCTURE OR CUT.: Brand: BARD-PARKER ® CARBON RIB-BACK BLADES

## (undated) DEVICE — SYRINGE 20ML LL

## (undated) DEVICE — PETRI DISH STERILE

## (undated) DEVICE — GLOVE SRG BIOGEL 7.5

## (undated) DEVICE — STERILE ICS CARDIOVASCULAR PK: Brand: CARDINAL HEALTH

## (undated) DEVICE — BAG DECANTER

## (undated) DEVICE — SET EXT 20IN IV LS SLIDE CLAMP LF NON DEHP

## (undated) DEVICE — SUT SILK 0 CT-1 30 IN 424H

## (undated) DEVICE — AVIATOR PLUS .014 5.0X30 142CM: Brand: AVIATOR

## (undated) DEVICE — PROXIMATE PLUS MD MULTI-DIRECTIONAL RELEASE SKIN STAPLERS CONTAINS 35 STAINLESS STEEL STAPLES APPROXIMATE CLOSED DIMENSIONS: 6.9MM X 3.9MM WIDE: Brand: PROXIMATE

## (undated) DEVICE — NEEDLE 25G X 1 1/2

## (undated) DEVICE — 3M™ IOBAN™ 2 ANTIMICROBIAL INCISE DRAPE 6640EZ: Brand: IOBAN™ 2

## (undated) DEVICE — SUT POLYESTER TAPE D-G 8618-00

## (undated) DEVICE — PAD GROUNDING ADULT

## (undated) DEVICE — Device

## (undated) DEVICE — 3000CC GUARDIAN II: Brand: GUARDIAN

## (undated) DEVICE — GUIDEWIRE THRUWAY 0.014 IN 130CM SHORT STR